# Patient Record
Sex: FEMALE | Race: WHITE | NOT HISPANIC OR LATINO | URBAN - METROPOLITAN AREA
[De-identification: names, ages, dates, MRNs, and addresses within clinical notes are randomized per-mention and may not be internally consistent; named-entity substitution may affect disease eponyms.]

---

## 2014-10-30 RX ORDER — ATORVASTATIN CALCIUM 80 MG/1
1 TABLET, FILM COATED ORAL
Qty: 0 | Refills: 0 | DISCHARGE
Start: 2014-10-30

## 2020-01-01 ENCOUNTER — OUTPATIENT (OUTPATIENT)
Dept: OUTPATIENT SERVICES | Facility: HOSPITAL | Age: 80
LOS: 1 days | End: 2020-01-01
Payer: MEDICAID

## 2020-01-01 PROCEDURE — G9001: CPT

## 2020-01-18 ENCOUNTER — INPATIENT (INPATIENT)
Facility: HOSPITAL | Age: 80
LOS: 5 days | Discharge: HOME | DRG: 871 | End: 2020-01-24
Attending: INTERNAL MEDICINE | Admitting: INTERNAL MEDICINE
Payer: MEDICARE

## 2020-01-18 VITALS
WEIGHT: 125 LBS | DIASTOLIC BLOOD PRESSURE: 72 MMHG | HEIGHT: 65 IN | RESPIRATION RATE: 18 BRPM | SYSTOLIC BLOOD PRESSURE: 167 MMHG | TEMPERATURE: 100 F | OXYGEN SATURATION: 92 % | HEART RATE: 93 BPM

## 2020-01-18 DIAGNOSIS — I48.91 UNSPECIFIED ATRIAL FIBRILLATION: ICD-10-CM

## 2020-01-18 DIAGNOSIS — J18.9 PNEUMONIA, UNSPECIFIED ORGANISM: ICD-10-CM

## 2020-01-18 DIAGNOSIS — G93.40 ENCEPHALOPATHY, UNSPECIFIED: ICD-10-CM

## 2020-01-18 DIAGNOSIS — I10 ESSENTIAL (PRIMARY) HYPERTENSION: ICD-10-CM

## 2020-01-18 DIAGNOSIS — I65.29 OCCLUSION AND STENOSIS OF UNSPECIFIED CAROTID ARTERY: ICD-10-CM

## 2020-01-18 DIAGNOSIS — E11.9 TYPE 2 DIABETES MELLITUS WITHOUT COMPLICATIONS: ICD-10-CM

## 2020-01-18 DIAGNOSIS — R82.81 PYURIA: ICD-10-CM

## 2020-01-18 DIAGNOSIS — R74.0 NONSPECIFIC ELEVATION OF LEVELS OF TRANSAMINASE AND LACTIC ACID DEHYDROGENASE [LDH]: ICD-10-CM

## 2020-01-18 LAB
ALBUMIN SERPL ELPH-MCNC: 3.5 G/DL — SIGNIFICANT CHANGE UP (ref 3.3–5)
ALP SERPL-CCNC: 99 U/L — SIGNIFICANT CHANGE UP (ref 40–120)
ALT FLD-CCNC: 53 U/L — HIGH (ref 10–45)
ANION GAP SERPL CALC-SCNC: 15 MMOL/L — SIGNIFICANT CHANGE UP (ref 5–17)
APPEARANCE UR: CLEAR — SIGNIFICANT CHANGE UP
AST SERPL-CCNC: 45 U/L — HIGH (ref 10–40)
BACTERIA # UR AUTO: NEGATIVE — SIGNIFICANT CHANGE UP
BASOPHILS # BLD AUTO: 0 K/UL — SIGNIFICANT CHANGE UP (ref 0–0.2)
BASOPHILS NFR BLD AUTO: 0 % — SIGNIFICANT CHANGE UP (ref 0–2)
BILIRUB SERPL-MCNC: 0.5 MG/DL — SIGNIFICANT CHANGE UP (ref 0.2–1.2)
BILIRUB UR-MCNC: NEGATIVE — SIGNIFICANT CHANGE UP
BUN SERPL-MCNC: 32 MG/DL — HIGH (ref 7–23)
CALCIUM SERPL-MCNC: 9.3 MG/DL — SIGNIFICANT CHANGE UP (ref 8.4–10.5)
CHLORIDE SERPL-SCNC: 97 MMOL/L — SIGNIFICANT CHANGE UP (ref 96–108)
CO2 SERPL-SCNC: 24 MMOL/L — SIGNIFICANT CHANGE UP (ref 22–31)
COLOR SPEC: SIGNIFICANT CHANGE UP
CREAT SERPL-MCNC: 1.07 MG/DL — SIGNIFICANT CHANGE UP (ref 0.5–1.3)
DIFF PNL FLD: ABNORMAL
EOSINOPHIL # BLD AUTO: 0 K/UL — SIGNIFICANT CHANGE UP (ref 0–0.5)
EOSINOPHIL NFR BLD AUTO: 0 % — SIGNIFICANT CHANGE UP (ref 0–6)
EPI CELLS # UR: 1 /HPF — SIGNIFICANT CHANGE UP
GAS PNL BLDV: SIGNIFICANT CHANGE UP
GLUCOSE BLDC GLUCOMTR-MCNC: 209 MG/DL — HIGH (ref 70–99)
GLUCOSE SERPL-MCNC: 309 MG/DL — HIGH (ref 70–99)
GLUCOSE UR QL: ABNORMAL
GRAN CASTS # UR COMP ASSIST: 1 /LPF — SIGNIFICANT CHANGE UP
HCT VFR BLD CALC: 39.5 % — SIGNIFICANT CHANGE UP (ref 34.5–45)
HGB BLD-MCNC: 12.6 G/DL — SIGNIFICANT CHANGE UP (ref 11.5–15.5)
HYALINE CASTS # UR AUTO: 3 /LPF — HIGH (ref 0–2)
KETONES UR-MCNC: SIGNIFICANT CHANGE UP
LEUKOCYTE ESTERASE UR-ACNC: NEGATIVE — SIGNIFICANT CHANGE UP
LYMPHOCYTES # BLD AUTO: 0.24 K/UL — LOW (ref 1–3.3)
LYMPHOCYTES # BLD AUTO: 1.7 % — LOW (ref 13–44)
MCHC RBC-ENTMCNC: 29.6 PG — SIGNIFICANT CHANGE UP (ref 27–34)
MCHC RBC-ENTMCNC: 31.9 GM/DL — LOW (ref 32–36)
MCV RBC AUTO: 92.7 FL — SIGNIFICANT CHANGE UP (ref 80–100)
MONOCYTES # BLD AUTO: 0.74 K/UL — SIGNIFICANT CHANGE UP (ref 0–0.9)
MONOCYTES NFR BLD AUTO: 5.3 % — SIGNIFICANT CHANGE UP (ref 2–14)
NEUTROPHILS # BLD AUTO: 12.94 K/UL — HIGH (ref 1.8–7.4)
NEUTROPHILS NFR BLD AUTO: 92.1 % — HIGH (ref 43–77)
NITRITE UR-MCNC: NEGATIVE — SIGNIFICANT CHANGE UP
PH UR: 5.5 — SIGNIFICANT CHANGE UP (ref 5–8)
PLATELET # BLD AUTO: 259 K/UL — SIGNIFICANT CHANGE UP (ref 150–400)
POTASSIUM SERPL-MCNC: 3.6 MMOL/L — SIGNIFICANT CHANGE UP (ref 3.5–5.3)
POTASSIUM SERPL-SCNC: 3.6 MMOL/L — SIGNIFICANT CHANGE UP (ref 3.5–5.3)
PROT SERPL-MCNC: 7.6 G/DL — SIGNIFICANT CHANGE UP (ref 6–8.3)
PROT UR-MCNC: ABNORMAL
RBC # BLD: 4.26 M/UL — SIGNIFICANT CHANGE UP (ref 3.8–5.2)
RBC # FLD: 12.4 % — SIGNIFICANT CHANGE UP (ref 10.3–14.5)
RBC CASTS # UR COMP ASSIST: 3 /HPF — SIGNIFICANT CHANGE UP (ref 0–4)
SODIUM SERPL-SCNC: 136 MMOL/L — SIGNIFICANT CHANGE UP (ref 135–145)
SP GR SPEC: 1.02 — SIGNIFICANT CHANGE UP (ref 1.01–1.02)
UROBILINOGEN FLD QL: NEGATIVE — SIGNIFICANT CHANGE UP
WBC # BLD: 13.91 K/UL — HIGH (ref 3.8–10.5)
WBC # FLD AUTO: 13.91 K/UL — HIGH (ref 3.8–10.5)
WBC UR QL: 6 /HPF — HIGH (ref 0–5)

## 2020-01-18 PROCEDURE — 99285 EMERGENCY DEPT VISIT HI MDM: CPT | Mod: GC

## 2020-01-18 PROCEDURE — 99223 1ST HOSP IP/OBS HIGH 75: CPT

## 2020-01-18 PROCEDURE — 93010 ELECTROCARDIOGRAM REPORT: CPT

## 2020-01-18 PROCEDURE — 71045 X-RAY EXAM CHEST 1 VIEW: CPT | Mod: 26

## 2020-01-18 PROCEDURE — 71250 CT THORAX DX C-: CPT | Mod: 26

## 2020-01-18 RX ORDER — SOTALOL HCL 120 MG
80 TABLET ORAL
Refills: 0 | Status: DISCONTINUED | OUTPATIENT
Start: 2020-01-18 | End: 2020-01-24

## 2020-01-18 RX ORDER — DEXTROSE 50 % IN WATER 50 %
25 SYRINGE (ML) INTRAVENOUS ONCE
Refills: 0 | Status: DISCONTINUED | OUTPATIENT
Start: 2020-01-18 | End: 2020-01-24

## 2020-01-18 RX ORDER — INSULIN LISPRO 100/ML
VIAL (ML) SUBCUTANEOUS AT BEDTIME
Refills: 0 | Status: DISCONTINUED | OUTPATIENT
Start: 2020-01-18 | End: 2020-01-24

## 2020-01-18 RX ORDER — ENOXAPARIN SODIUM 100 MG/ML
40 INJECTION SUBCUTANEOUS DAILY
Refills: 0 | Status: DISCONTINUED | OUTPATIENT
Start: 2020-01-18 | End: 2020-01-24

## 2020-01-18 RX ORDER — ACETAMINOPHEN 500 MG
650 TABLET ORAL EVERY 6 HOURS
Refills: 0 | Status: DISCONTINUED | OUTPATIENT
Start: 2020-01-18 | End: 2020-01-24

## 2020-01-18 RX ORDER — ACETAMINOPHEN 500 MG
975 TABLET ORAL ONCE
Refills: 0 | Status: COMPLETED | OUTPATIENT
Start: 2020-01-18 | End: 2020-01-18

## 2020-01-18 RX ORDER — INSULIN LISPRO 100/ML
VIAL (ML) SUBCUTANEOUS
Refills: 0 | Status: DISCONTINUED | OUTPATIENT
Start: 2020-01-18 | End: 2020-01-24

## 2020-01-18 RX ORDER — SOTALOL HCL 120 MG
1 TABLET ORAL
Qty: 0 | Refills: 0 | DISCHARGE

## 2020-01-18 RX ORDER — CEFTRIAXONE 500 MG/1
1000 INJECTION, POWDER, FOR SOLUTION INTRAMUSCULAR; INTRAVENOUS EVERY 24 HOURS
Refills: 0 | Status: DISCONTINUED | OUTPATIENT
Start: 2020-01-19 | End: 2020-01-23

## 2020-01-18 RX ORDER — ATORVASTATIN CALCIUM 80 MG/1
40 TABLET, FILM COATED ORAL AT BEDTIME
Refills: 0 | Status: DISCONTINUED | OUTPATIENT
Start: 2020-01-18 | End: 2020-01-24

## 2020-01-18 RX ORDER — SODIUM CHLORIDE 9 MG/ML
1000 INJECTION INTRAMUSCULAR; INTRAVENOUS; SUBCUTANEOUS ONCE
Refills: 0 | Status: COMPLETED | OUTPATIENT
Start: 2020-01-18 | End: 2020-01-18

## 2020-01-18 RX ORDER — CEFTRIAXONE 500 MG/1
1000 INJECTION, POWDER, FOR SOLUTION INTRAMUSCULAR; INTRAVENOUS ONCE
Refills: 0 | Status: COMPLETED | OUTPATIENT
Start: 2020-01-18 | End: 2020-01-18

## 2020-01-18 RX ORDER — AZITHROMYCIN 500 MG/1
500 TABLET, FILM COATED ORAL ONCE
Refills: 0 | Status: COMPLETED | OUTPATIENT
Start: 2020-01-18 | End: 2020-01-18

## 2020-01-18 RX ORDER — EZETIMIBE 10 MG/1
1 TABLET ORAL
Qty: 0 | Refills: 0 | DISCHARGE

## 2020-01-18 RX ORDER — DEXTROSE 50 % IN WATER 50 %
12.5 SYRINGE (ML) INTRAVENOUS ONCE
Refills: 0 | Status: DISCONTINUED | OUTPATIENT
Start: 2020-01-18 | End: 2020-01-24

## 2020-01-18 RX ORDER — SODIUM CHLORIDE 9 MG/ML
1000 INJECTION, SOLUTION INTRAVENOUS
Refills: 0 | Status: DISCONTINUED | OUTPATIENT
Start: 2020-01-18 | End: 2020-01-24

## 2020-01-18 RX ORDER — GLUCAGON INJECTION, SOLUTION 0.5 MG/.1ML
1 INJECTION, SOLUTION SUBCUTANEOUS ONCE
Refills: 0 | Status: DISCONTINUED | OUTPATIENT
Start: 2020-01-18 | End: 2020-01-24

## 2020-01-18 RX ORDER — DEXTROSE 50 % IN WATER 50 %
15 SYRINGE (ML) INTRAVENOUS ONCE
Refills: 0 | Status: DISCONTINUED | OUTPATIENT
Start: 2020-01-18 | End: 2020-01-24

## 2020-01-18 RX ADMIN — SODIUM CHLORIDE 1000 MILLILITER(S): 9 INJECTION INTRAMUSCULAR; INTRAVENOUS; SUBCUTANEOUS at 19:40

## 2020-01-18 RX ADMIN — SODIUM CHLORIDE 1000 MILLILITER(S): 9 INJECTION INTRAMUSCULAR; INTRAVENOUS; SUBCUTANEOUS at 17:44

## 2020-01-18 RX ADMIN — ATORVASTATIN CALCIUM 40 MILLIGRAM(S): 80 TABLET, FILM COATED ORAL at 22:45

## 2020-01-18 RX ADMIN — AZITHROMYCIN 250 MILLIGRAM(S): 500 TABLET, FILM COATED ORAL at 20:17

## 2020-01-18 RX ADMIN — Medication 975 MILLIGRAM(S): at 17:44

## 2020-01-18 RX ADMIN — CEFTRIAXONE 100 MILLIGRAM(S): 500 INJECTION, POWDER, FOR SOLUTION INTRAMUSCULAR; INTRAVENOUS at 19:39

## 2020-01-18 NOTE — ED ADULT NURSE NOTE - PMH
Diabetes mellitus, type II    HTN (hypertension)    Hypercholesteremia    Stenosis of carotid artery, bilateral

## 2020-01-18 NOTE — H&P ADULT - NSICDXPASTMEDICALHX_GEN_ALL_CORE_FT
PAST MEDICAL HISTORY:  Atrial fibrillation     Diabetes mellitus, type II     HTN (hypertension)     Hypercholesteremia     Stenosis of carotid artery, bilateral

## 2020-01-18 NOTE — H&P ADULT - PROBLEM SELECTOR PLAN 1
-Treat as possible infectious etiology with antibiotics  -If does not improve would consider CT head given risk factors (carotid stenosis, diabetes, atrial fibrillation not on anticoagulation)  -Send thyroid studies (prior history of subclinical hyperthyroidism)

## 2020-01-18 NOTE — H&P ADULT - NSHPREVIEWOFSYSTEMS_GEN_ALL_CORE
Gen: no changes in weight, fatigue, night sweats, appetite, or fever  Eyes: no changes in vision, diplopia, or floaters  ENT: no epistaxis, sinus pain, gingival bleeding, odynophagia or dysphagia  CV: no CP, SOB, PND, orthopnea, LOC, or palpitations  Resp: no cough, wheezing, or hemoptysis  GI: no abdominal pain, dyspepsia, nausea, vomiting, diarrhea, constipation, hematemesis, hematochezia, or melena  : no dysuria, polyuria, or hematuria  MSK: no arthralgias or joint swelling   Neuro: no gross sensory changes, numbness, focal deficits  Psych: no depression, changes in sleep, changes in concentration, or lack of energy  Heme/Onc: no purpura, petechiae or night sweats  Skin: no pruritus, hair loss or skin lesions  All: no photosensitivity, no complaints of anaphylaxis (SOB, throat swelling) Gen: no changes in weight, fatigue  Eyes: no changes in vision, diplopia, or floaters  ENT: no epistaxis, sinus pain, gingival bleeding, odynophagia or dysphagia  CV: no CP, SOB, PND, orthopnea, LOC, or palpitations  Resp: +cough, no wheezing  GI: no abdominal pain, dyspepsia, nausea, vomiting, diarrhea  : no dysuria, polyuria, or hematuria  MSK: no arthralgias or joint swelling   Neuro: no gross sensory changes, numbness, focal deficits  Psych: no depression, changes in sleep, changes in concentration, or lack of energy  Heme/Onc: no purpura, petechiae or night sweats  Skin: no pruritus, hair loss or skin lesions  All: no photosensitivity, no complaints of anaphylaxis (SOB, throat swelling)

## 2020-01-18 NOTE — H&P ADULT - HISTORY OF PRESENT ILLNESS
79F (German speaking) with PMHx of HTN, T2DM, significant carotid stenosis, and atrial fibrillation referred by PMD Dr. Tatum for confusion. The patient's daughter and son are at bedside providing me with the history. They state that approximately 10 days prior her household contacts (grandchildren) were sick with influenza. Afterwards patient developed non-productive cough. Approximately two to three days prior to admission patient appeared confused mixing up her daughter and granddaughter and forgetting how to use a remote. The children found this unusual, but thought it might be due to her age. They state that this confusion began abruptly two to three days prior. Since then she has not really endorsed any complaints such as dysuria, diarrhea, or abdominal pain. Unsure if there is a history of rhinorrhea, nasal congestion, sore throat, or other URI like symptoms. Patient was brought to her PMD on 1/18/20 who did a rapid influenza test which was negative. She was found to be febrile there and told to report to an emergency room. Patient with no acute complaints while in the ED. She was given ceftriaxone, azithromycin, 2 L NS, with blood and urine cultures sent as well as urine legionella. Patient with noted mild desaturations to the low 90s which corrected with nasal cannula. Patient with no visible neurological deficits. The ED is admitting due to suspicion for pneumonia.

## 2020-01-18 NOTE — H&P ADULT - ASSESSMENT
79F (Slovak speaking) with PMHx of HTN, T2DM, significant carotid stenosis, and atrial fibrillation referred by PMD Dr. Tatum for confusion and noted fever for three days. Patient's family endorsing sick contacts with family suffering from viral URI. Patient slightly febrile here with minor hypoxic respiratory failure which corrects with 2 L NC. Labs are significant for neutrophilic pre-dominant leukocytosis, slight transaminitis and slight pyuria. CXR showing possible left lower lobe opacity with CT chest being read as bibasilar atelectasis.     There is suspicion that patient has encephalopathy secondary to infectious etiology, possibly the lung. Given her slight acute hypoxic respiratory failure will treat empirically for pneumonia. Patient with no dysuria and only mild pyuria. She has a history of atrial fibrillation and bilateral carotid stenosis, would need to consider CT head and primary neurological issue if she does not improve on antibiotics.

## 2020-01-18 NOTE — H&P ADULT - NSHPLABSRESULTS_GEN_ALL_CORE
EKG personally reviewed by me: [id number], QTc [id number]    CXR personally reviewed by me: possible opacity in left lung base    CT Chest personally reviewed by me: bibasilar atelectasis

## 2020-01-18 NOTE — ED PROVIDER NOTE - OBJECTIVE STATEMENT
79 year old female patient PMH HTN and DM complaining of cough for approximately 2 weeks and AMS x 2 days. Patient admits to dry cough, fever, and fatigue. Patient was able to dictate her name and day of the week, but unable to dictate her date of birth or the month and year. Patient was seen by PMD today (01/18/2020) where ECG was normal and flu swab negative. Patient denies chills, frequency, dysuria, and urgency. Patient denies alcohol use, numbness, tingling, weakness in upper and lower extremities, or history of seizures. Patient denies taking blood sugar this morning and any treatments for symptoms.

## 2020-01-18 NOTE — ED ADULT NURSE NOTE - OBJECTIVE STATEMENT
Pt is a 80 yo F who was brought ot the ED by her family c/o fever/ams for a few days. Family states pt has been saying nonsensical things since Thursday, has been mixing up family member's names and last night she slept on the couch because she thought she had guests in the house and sleeping in her room. Also c/o productive cough with green phlegm x10 days with migdalia rib pain. Also c/o back pain "for months". Family states pt had slurred speech and they thought it was related to her dentures, maybe she didn't put them in properly. Pt speaks Beninese, family interprets, is A/O x3, denies pain, no ha/dizziness/lightheadedness, no CP/sob/palpitations, no abd pain/n/v/d. no change in urinary/bowel habits. Pt is a 78 yo F who was brought ot the ED by her family c/o fever/ams for a few days. Family states pt has been saying nonsensical things since Thursday, has been mixing up family member's names and last night she slept on the couch because she thought she had guests in the house and sleeping in her room. Also c/o productive cough with green phlegm x10 days with migdalia rib pain whencoughing. Also c/o back pain "for months". Family states pt had slurred speech and they thought it was related to her dentures, maybe she didn't put them in properly. Pt speaks French, family interprets, is A/O to person, date, thinks she is at her old job, denies pain, no ha/dizziness/lightheadedness, no CP/sob/palpitations, no abd pain/n/v/d. no change in urinary/bowel habits.

## 2020-01-18 NOTE — H&P ADULT - PROBLEM SELECTOR PLAN 8
-Continue to trend and avoid hepatotoxic medications  -US gallbladder ordered by ED, will follow it up, but don't suspect abdominal pathology given lack of symptoms and minor LFT elevation in a non-cholestatic manner

## 2020-01-18 NOTE — PATIENT PROFILE ADULT - HAS THE PATIENT BEEN ADMITTED FROM SHORT TERM REHAB?
no
I, Gladys Foley, performed the initial face to face bedside interview with this patient regarding history of present illness, review of symptoms and relevant past medical, social and family history.  I completed an independent physical examination.  I was the initial provider who evaluated this patient. I have signed out the follow up of any pending tests (i.e. labs, radiological studies) to the ACP with instructions to review any pertinent findings to me prior to determining a final disposition.  I have communicated the patient’s plan of care and disposition with the ACP.  The history, relevant review of systems, past medical and surgical history, medical decision making, and physical examination was documented by the scribe in my presence and I attest to the accuracy of the documentation.

## 2020-01-18 NOTE — H&P ADULT - PROBLEM SELECTOR PLAN 2
-Empiric treatment with Ceftriaxone  -Follow up urine legionella, start if positive  -Follow up blood cultures  -Patient with acute hypoxic respiratory failure, provide oxygen PRN

## 2020-01-18 NOTE — ED PROVIDER NOTE - CLINICAL SUMMARY MEDICAL DECISION MAKING FREE TEXT BOX
80 y/o [place] ZR speaking female, history taken from son and daughter, with history of htn, dm type 2, hld, came in with cough x 2 weeks, fever and today family noticed she was confused. concern for pneumonia, possible legionella, UTI, will obtain bloodwork, culture, CXR, IV hydration, and admission to hospital.DESI

## 2020-01-19 LAB
ALBUMIN SERPL ELPH-MCNC: 3.1 G/DL — LOW (ref 3.3–5)
ALP SERPL-CCNC: 90 U/L — SIGNIFICANT CHANGE UP (ref 40–120)
ALT FLD-CCNC: 45 U/L — SIGNIFICANT CHANGE UP (ref 10–45)
ANION GAP SERPL CALC-SCNC: 16 MMOL/L — SIGNIFICANT CHANGE UP (ref 5–17)
AST SERPL-CCNC: 40 U/L — SIGNIFICANT CHANGE UP (ref 10–40)
BILIRUB SERPL-MCNC: 0.6 MG/DL — SIGNIFICANT CHANGE UP (ref 0.2–1.2)
BUN SERPL-MCNC: 16 MG/DL — SIGNIFICANT CHANGE UP (ref 7–23)
CALCIUM SERPL-MCNC: 8.9 MG/DL — SIGNIFICANT CHANGE UP (ref 8.4–10.5)
CHLORIDE SERPL-SCNC: 103 MMOL/L — SIGNIFICANT CHANGE UP (ref 96–108)
CO2 SERPL-SCNC: 23 MMOL/L — SIGNIFICANT CHANGE UP (ref 22–31)
CREAT SERPL-MCNC: 0.79 MG/DL — SIGNIFICANT CHANGE UP (ref 0.5–1.3)
CULTURE RESULTS: NO GROWTH — SIGNIFICANT CHANGE UP
GLUCOSE BLDC GLUCOMTR-MCNC: 151 MG/DL — HIGH (ref 70–99)
GLUCOSE BLDC GLUCOMTR-MCNC: 154 MG/DL — HIGH (ref 70–99)
GLUCOSE BLDC GLUCOMTR-MCNC: 155 MG/DL — HIGH (ref 70–99)
GLUCOSE BLDC GLUCOMTR-MCNC: 176 MG/DL — HIGH (ref 70–99)
GLUCOSE SERPL-MCNC: 189 MG/DL — HIGH (ref 70–99)
HCT VFR BLD CALC: 35.6 % — SIGNIFICANT CHANGE UP (ref 34.5–45)
HGB BLD-MCNC: 11.7 G/DL — SIGNIFICANT CHANGE UP (ref 11.5–15.5)
LEGIONELLA AG UR QL: NEGATIVE — SIGNIFICANT CHANGE UP
MCHC RBC-ENTMCNC: 30.2 PG — SIGNIFICANT CHANGE UP (ref 27–34)
MCHC RBC-ENTMCNC: 32.9 GM/DL — SIGNIFICANT CHANGE UP (ref 32–36)
MCV RBC AUTO: 91.8 FL — SIGNIFICANT CHANGE UP (ref 80–100)
PLATELET # BLD AUTO: 239 K/UL — SIGNIFICANT CHANGE UP (ref 150–400)
POTASSIUM SERPL-MCNC: 3.6 MMOL/L — SIGNIFICANT CHANGE UP (ref 3.5–5.3)
POTASSIUM SERPL-SCNC: 3.6 MMOL/L — SIGNIFICANT CHANGE UP (ref 3.5–5.3)
PROCALCITONIN SERPL-MCNC: 0.42 NG/ML — HIGH (ref 0.02–0.1)
PROT SERPL-MCNC: 7.1 G/DL — SIGNIFICANT CHANGE UP (ref 6–8.3)
RAPID RVP RESULT: SIGNIFICANT CHANGE UP
RBC # BLD: 3.88 M/UL — SIGNIFICANT CHANGE UP (ref 3.8–5.2)
RBC # FLD: 12.4 % — SIGNIFICANT CHANGE UP (ref 10.3–14.5)
SODIUM SERPL-SCNC: 142 MMOL/L — SIGNIFICANT CHANGE UP (ref 135–145)
SPECIMEN SOURCE: SIGNIFICANT CHANGE UP
T4 AB SER-ACNC: 7.9 UG/DL — SIGNIFICANT CHANGE UP (ref 4.6–12)
TSH SERPL-MCNC: 0.31 UIU/ML — SIGNIFICANT CHANGE UP (ref 0.27–4.2)
WBC # BLD: 13.74 K/UL — HIGH (ref 3.8–10.5)
WBC # FLD AUTO: 13.74 K/UL — HIGH (ref 3.8–10.5)

## 2020-01-19 PROCEDURE — 70498 CT ANGIOGRAPHY NECK: CPT | Mod: 26

## 2020-01-19 PROCEDURE — 70496 CT ANGIOGRAPHY HEAD: CPT | Mod: 26

## 2020-01-19 RX ADMIN — Medication 650 MILLIGRAM(S): at 10:20

## 2020-01-19 RX ADMIN — CEFTRIAXONE 100 MILLIGRAM(S): 500 INJECTION, POWDER, FOR SOLUTION INTRAMUSCULAR; INTRAVENOUS at 17:46

## 2020-01-19 RX ADMIN — Medication 100 MILLIGRAM(S): at 17:46

## 2020-01-19 RX ADMIN — ENOXAPARIN SODIUM 40 MILLIGRAM(S): 100 INJECTION SUBCUTANEOUS at 11:54

## 2020-01-19 RX ADMIN — Medication 10 MILLIGRAM(S): at 17:14

## 2020-01-19 RX ADMIN — ATORVASTATIN CALCIUM 40 MILLIGRAM(S): 80 TABLET, FILM COATED ORAL at 21:14

## 2020-01-19 RX ADMIN — Medication 80 MILLIGRAM(S): at 17:14

## 2020-01-19 RX ADMIN — Medication 1: at 17:57

## 2020-01-19 RX ADMIN — Medication 10 MILLIGRAM(S): at 06:06

## 2020-01-19 RX ADMIN — Medication 1: at 11:53

## 2020-01-19 RX ADMIN — Medication 1: at 08:59

## 2020-01-19 RX ADMIN — Medication 80 MILLIGRAM(S): at 06:06

## 2020-01-19 NOTE — PROGRESS NOTE ADULT - SUBJECTIVE AND OBJECTIVE BOX
Patient is a 79y old  Female who presents with a chief complaint of Confusion (2020 19:50)      SUBJECTIVE / OVERNIGHT EVENTS: Comfortable without new complaints. Family at bedside.   Review of Systems  chest pain no  palpitations no  sob no  nausea no  headache no    MEDICATIONS  (STANDING):  atorvastatin 40 milliGRAM(s) Oral at bedtime  cefTRIAXone   IVPB 1000 milliGRAM(s) IV Intermittent every 24 hours  dextrose 5%. 1000 milliLiter(s) (50 mL/Hr) IV Continuous <Continuous>  dextrose 50% Injectable 12.5 Gram(s) IV Push once  dextrose 50% Injectable 25 Gram(s) IV Push once  dextrose 50% Injectable 25 Gram(s) IV Push once  enalapril 10 milliGRAM(s) Oral two times a day  enoxaparin Injectable 40 milliGRAM(s) SubCutaneous daily  insulin lispro (HumaLOG) corrective regimen sliding scale   SubCutaneous three times a day before meals  insulin lispro (HumaLOG) corrective regimen sliding scale   SubCutaneous at bedtime  sotalol 80 milliGRAM(s) Oral two times a day    MEDICATIONS  (PRN):  acetaminophen   Tablet .. 650 milliGRAM(s) Oral every 6 hours PRN Mild Pain (1 - 3), Moderate Pain (4 - 6)  dextrose 40% Gel 15 Gram(s) Oral once PRN Blood Glucose LESS THAN 70 milliGRAM(s)/deciliter  glucagon  Injectable 1 milliGRAM(s) IntraMuscular once PRN Glucose LESS THAN 70 milligrams/deciliter      Vital Signs Last 24 Hrs  T(C): 36.8 (2020 14:44), Max: 39 (2020 10:11)  T(F): 98.2 (2020 14:44), Max: 102.2 (2020 10:11)  HR: 73 (2020 14:44) (73 - 94)  BP: 130/72 (2020 14:44) (125/62 - 167/72)  BP(mean): --  RR: 18 (2020 14:44) (18 - 20)  SpO2: 95% (2020 14:44) (89% - 96%)    PHYSICAL EXAM:  GENERAL: NAD  HEAD:  Atraumatic, Normocephalic  EYES: EOMI, PERRLA, conjunctiva and sclera clear  NECK: Supple, No JVD  CHEST/LUNG: Clear to auscultation bilaterally; No wheeze  HEART: Regular rate and rhythm; No murmurs, rubs, or gallops  ABDOMEN: Soft, Nontender, Nondistended; Bowel sounds present  EXTREMITIES:  2+ Peripheral Pulses, No clubbing, cyanosis, or edema  PSYCH: AAOx3  NEUROLOGY: non-focal  SKIN: No rashes or lesions    LABS:                        11.7   13.74 )-----------( 239      ( 2020 09:30 )             35.6         142  |  103  |  16  ----------------------------<  189<H>  3.6   |  23  |  0.79    Ca    8.9      2020 07:14    TPro  7.1  /  Alb  3.1<L>  /  TBili  0.6  /  DBili  x   /  AST  40  /  ALT  45  /  AlkPhos  90            Urinalysis Basic - ( 2020 17:57 )    Color: Light Yellow / Appearance: Clear / S.023 / pH: x  Gluc: x / Ketone: Trace  / Bili: Negative / Urobili: Negative   Blood: x / Protein: 30 mg/dL / Nitrite: Negative   Leuk Esterase: Negative / RBC: 3 /hpf / WBC 6 /HPF   Sq Epi: x / Non Sq Epi: 1 /hpf / Bacteria: Negative        Culture - Urine (collected 2020 20:00)  Source: .Urine Clean Catch (Midstream)  Final Report (2020 14:53):    No growth        RADIOLOGY & ADDITIONAL TESTS:    Imaging Personally Reviewed:    Consultant(s) Notes Reviewed:      Care Discussed with Consultants/Other Providers:

## 2020-01-19 NOTE — CONSULT NOTE ADULT - SUBJECTIVE AND OBJECTIVE BOX
HPI:   Patient is a 79y female who developed cough about 10 days ago and starting 4 days ago she became confused, went to see her pcp yesterday, swab negative for flu in office and sent to hospital. She has ongoing fever and confusion and wet cough. No recent travel, her family member  had the flu recently, no vomiting or dental work.   Patient cannot give a coherent history  REVIEW OF SYSTEMS:  All other review of systems negative (Comprehensive ROS)    PAST MEDICAL & SURGICAL HISTORY:  Atrial fibrillation  Diabetes mellitus, type II  Hypercholesteremia  HTN (hypertension)  Stenosis of carotid artery, bilateral  S/P cataract extraction, right  S/P appendectomy      Allergies    No Known Allergies    Intolerances        Antimicrobials Day #  :1  cefTRIAXone   IVPB 1000 milliGRAM(s) IV Intermittent every 24 hours  doxycycline hyclate Capsule 100 milliGRAM(s) Oral every 12 hours    Other Medications:  acetaminophen   Tablet .. 650 milliGRAM(s) Oral every 6 hours PRN  atorvastatin 40 milliGRAM(s) Oral at bedtime  dextrose 40% Gel 15 Gram(s) Oral once PRN  dextrose 5%. 1000 milliLiter(s) IV Continuous <Continuous>  dextrose 50% Injectable 12.5 Gram(s) IV Push once  dextrose 50% Injectable 25 Gram(s) IV Push once  dextrose 50% Injectable 25 Gram(s) IV Push once  enalapril 10 milliGRAM(s) Oral two times a day  enoxaparin Injectable 40 milliGRAM(s) SubCutaneous daily  glucagon  Injectable 1 milliGRAM(s) IntraMuscular once PRN  insulin lispro (HumaLOG) corrective regimen sliding scale   SubCutaneous three times a day before meals  insulin lispro (HumaLOG) corrective regimen sliding scale   SubCutaneous at bedtime  sotalol 80 milliGRAM(s) Oral two times a day      FAMILY HISTORY:  Family history of hypercholesterolemia  Family history of hypertension      SOCIAL HISTORY:  Smoking: [ ]Yes [x ]No  ETOH: [ ]Yes x[ ]No  Drug Use: [ ]Yes [x ]No   [x ] Single[ ]    T(F): 98.2 (20 @ 14:44), Max: 102.2 (20 @ 10:11)  HR: 73 (20 @ 14:44)  BP: 130/72 (20 @ 14:44)  RR: 18 (20 @ 14:44)  SpO2: 95% (20 @ 14:44)  Wt(kg): --    PHYSICAL EXAM:  General: alert, no acute distress  Eyes:  anicteric, no conjunctival injection, no discharge  Oropharynx: no lesions or injection 	  Neck: supple, without adenopathy  Lungs: clear to auscultation  Heart: regular rate and rhythm; no murmur, rubs or gallops  Abdomen: soft, nondistended, nontender, without mass or organomegaly  Skin: no lesions  Extremities: no clubbing, cyanosis, or edema  Neurologic: alert, pleasantly confused, moves all extremities    LAB RESULTS:                        11.7   13.74 )-----------( 239      ( 2020 09:30 )             35.6         142  |  103  |  16  ----------------------------<  189<H>  3.6   |  23  |  0.79    Ca    8.9      2020 07:14    TPro  7.1  /  Alb  3.1<L>  /  TBili  0.6  /  DBili  x   /  AST  40  /  ALT  45  /  AlkPhos  90      LIVER FUNCTIONS - ( 2020 07:14 )  Alb: 3.1 g/dL / Pro: 7.1 g/dL / ALK PHOS: 90 U/L / ALT: 45 U/L / AST: 40 U/L / GGT: x           Urinalysis Basic - ( 2020 17:57 )    Color: Light Yellow / Appearance: Clear / S.023 / pH: x  Gluc: x / Ketone: Trace  / Bili: Negative / Urobili: Negative   Blood: x / Protein: 30 mg/dL / Nitrite: Negative   Leuk Esterase: Negative / RBC: 3 /hpf / WBC 6 /HPF   Sq Epi: x / Non Sq Epi: 1 /hpf / Bacteria: Negative        MICROBIOLOGY:  RECENT CULTURES:   @ 20:00 .Urine Clean Catch (Midstream)     No growth      RADIOLOGY REVIEWED:    < from: CT Chest No Cont (20 @ 19:53) >  EXAM:  CT CHEST                            PROCEDURE DATE:  2020            INTERPRETATION:  CLINICAL INFORMATION: Fever    COMPARISON: Chest radiograph performed earlier the same day.    PROCEDURE:   CT of the Chest was performed without intravenous contrast.  Sagittal and coronal reformats were performed.    FINDINGS:    No axillary adenopathy. Small mediastinal lymph nodes. The thyroid is enlarged.    Calcifications of the aortic root and coronary arteries. The heart is normal in size.No pericardial effusion.    No endobronchial lesion. Mosaic attenuation of the lungs, likely air trapping. Parenchymal opacities are noted in both lower lobes. Bilateral parenchymal infiltrates are suspected.  Area of tree-in-bud opacities in the right lower lobe (2:65), likely impacted distal airways. No pneumothorax.    The visualized upper abdomen is unremarkable.    Evaluation of the osseous structures demonstrates degenerative changes of the spine.    IMPRESSION:     Bibasilar parenchymal infiltrates.      < end of copied text >  Impression:  Patient with 10 days of cough, 4 days of confusion, now known to be febrile, infiltrates on ct chest. Suspect bacterial pneumonia, may even be post viral.     Recommendations:  cover with doxy and ceftriaxone  sputum culture  check full rvp  neuro w/u in progress per neurologist  f/u cultures  supportive care per Dr. Lacey  speech and swallow evaluation

## 2020-01-19 NOTE — CHART NOTE - NSCHARTNOTEFT_GEN_A_CORE
[facility] Event Note    Notified by RN patient with fever - temperature of 102.2 .  Patient seen and examined at the bedside.  Patient is alert.  Denies headache, visual changes, chest pain, palpitations, shortness of breath, cough, abdominal pain, nausea, vomiting, diarrhea and dysuria.  VITAL SIGNS:  T(C): 36.8 (01-19-20 @ 14:44), Max: 39 (01-19-20 @ 10:11)  T(F): 98.2 (01-19-20 @ 14:44), Max: 102.2 (01-19-20 @ 10:11)  HR: 73 (01-19-20 @ 14:44) (73 - 94)  BP: 130/72 (01-19-20 @ 14:44) (125/62 - 166/81)  RR: 18 (01-19-20 @ 14:44) (18 - 20)  SpO2: 95% (01-19-20 @ 14:44) (89% - 96%)    Urine Culture: UCx from 1/18 shows NGTD  Blood Culture: BCx x2 from 1/18 pending results  CXR from 1/18 shows Patchy bibasilar opacity may represent atelectasis versus pneumonia. Small left pleural effusion.      PHYSICAL EXAM:   GENERAL: Laying down, in no acute distress  PSYCH: AAOx3  HEAD:  Atraumatic, Normocephalic  EYES: EOMI, PERRLA, conjunctiva and sclera clear  NECK: Supple, No JVD  CHEST/LUNG: Breath sounds clear to auscultation bilaterally.  No wheezes, rales, rhonchi or crackles  HEART: +S1/S2.  Regular rate and rhythm.  No murmurs, rubs or gallops  ABDOMEN: Bowel sounds present in all 4 quadrants.  Soft, Nontender, Nondistended  EXTREMITIES: No edema or erythema noted in bilateral lower extremities  NEUROLOGY: Cranial nerves 2-12 grossly intact  SKIN: No rashes or lesions      1) fever  - Tylenol / Cooling measures prn for Pyrexia  -Discussed with Dr. Lacey- ID consult placed today  -Continue IV ceftriaxone  -Will continue to monitor and endorse to night team    Lis White PA-C  Dept of Medicine  #[id number]

## 2020-01-19 NOTE — CONSULT NOTE ADULT - SUBJECTIVE AND OBJECTIVE BOX
HPI:  79F (Kinyarwanda speaking) with PMHx of HTN, T2DM, significant carotid stenosis, and atrial fibrillation referred by PMD Dr. Tatum for confusion. The patient's daughter and son are at bedside providing me with the history. They state that approximately 10 days prior her household contacts (grandchildren) were sick with influenza. Afterwards patient developed non-productive cough. Approximately two to three days prior to admission patient appeared confused mixing up her daughter and granddaughter and forgetting how to use a remote. The children found this unusual, but thought it might be due to her age. They state that this confusion began abruptly two to three days prior. Since then she has not really endorsed any complaints such as dysuria, diarrhea, or abdominal pain. Unsure if there is a history of rhinorrhea, nasal congestion, sore throat, or other URI like symptoms. Patient was brought to her PMD on 20 who did a rapid influenza test which was negative. She was found to be febrile there and told to report to an emergency room. Patient with no acute complaints while in the ED. She was given ceftriaxone, azithromycin, 2 L NS, with blood and urine cultures sent as well as urine legionella. Patient with noted mild desaturations to the low 90s which corrected with nasal cannula. Patient with no visible neurological deficits. The ED is admitting due to suspicion for pneumonia. (2020 19:50)    (Stroke only)  NIHSS:   MRS:   ICH:     REVIEW OF SYSTEMS  General:	  Skin/Breast:	  Ophthalmologic:  ENMT:	  Respiratory and Thorax:	  Cardiovascular:	  Gastrointestinal:	  Genitourinary:	  Musculoskeletal:	  Neurological:	  Psychiatric:	  Hematology/Lymphatics:	  Endocrine:	  Allergic/Immunologic:	    A 10-system ROS was performed and is negative except for those items noted above and/or in the HPI.    PAST MEDICAL & SURGICAL HISTORY:  Atrial fibrillation  Diabetes mellitus, type II  Hypercholesteremia  HTN (hypertension)  Stenosis of carotid artery, bilateral  S/P cataract extraction, right  S/P appendectomy    FAMILY HISTORY:  Family history of hypercholesterolemia  Family history of hypertension    SOCIAL HISTORY:   T/E/D:   Occupation:   Lives with:     MEDICATIONS (HOME):  Home Medications:  atorvastatin 40 mg oral tablet: 1 tab(s) orally once a day (2020 20:45)  enalapril 10 mg oral tablet: 1 tab(s) orally 2 times a day (2020 20:45)  ezetimibe 10 mg oral tablet: 1 tab(s) orally once a day (2020 20:46)  glipiZIDE 10 mg oral tablet: 1 tab(s) orally once a day (2020 20:45)  sotalol 80 mg oral tablet: 1 tab(s) orally 2 times a day (2020 20:45)    MEDICATIONS  (STANDING):  atorvastatin 40 milliGRAM(s) Oral at bedtime  cefTRIAXone   IVPB 1000 milliGRAM(s) IV Intermittent every 24 hours  dextrose 5%. 1000 milliLiter(s) (50 mL/Hr) IV Continuous <Continuous>  dextrose 50% Injectable 12.5 Gram(s) IV Push once  dextrose 50% Injectable 25 Gram(s) IV Push once  dextrose 50% Injectable 25 Gram(s) IV Push once  enalapril 10 milliGRAM(s) Oral two times a day  enoxaparin Injectable 40 milliGRAM(s) SubCutaneous daily  insulin lispro (HumaLOG) corrective regimen sliding scale   SubCutaneous three times a day before meals  insulin lispro (HumaLOG) corrective regimen sliding scale   SubCutaneous at bedtime  sotalol 80 milliGRAM(s) Oral two times a day    MEDICATIONS  (PRN):  acetaminophen   Tablet .. 650 milliGRAM(s) Oral every 6 hours PRN Mild Pain (1 - 3), Moderate Pain (4 - 6)  dextrose 40% Gel 15 Gram(s) Oral once PRN Blood Glucose LESS THAN 70 milliGRAM(s)/deciliter  glucagon  Injectable 1 milliGRAM(s) IntraMuscular once PRN Glucose LESS THAN 70 milligrams/deciliter    ALLERGIES/INTOLERANCES:  Allergies  No Known Allergies    Intolerances    VITALS & EXAMINATION:  Vital Signs Last 24 Hrs  T(C): 36.8 (2020 14:44), Max: 39 (2020 10:11)  T(F): 98.2 (2020 14:44), Max: 102.2 (2020 10:11)  HR: 73 (2020 14:44) (73 - 94)  BP: 130/72 (2020 14:44) (125/62 - 167/72)  BP(mean): --  RR: 18 (2020 14:44) (18 - 20)  SpO2: 95% (2020 14:44) (89% - 96%)    General:  Constitutional: Obese Female, appears stated age, in no apparent distress including pain  Head: Normocephalic & atraumatic.  ENT: Patent ear canals, intact TM, mucus membranes moist & pink, neck supple, no lymphadenopathy.   Respiratory: Patent airway. All lung fields are clear to auscultation bilaterally.  Extremities: No cyanosis, clubbing, or edema.  Skin: No rashes, bruising, or discoloration.    Cardiovascular (>2): RRR no murmurs. Carotid pulsations symmetric, no bruits. Normal capillary beds refill, 1-2 seconds or less.     Neurological (>12):  MS: Awake, alert, oriented to person, place, situation, time. Normal affect. Follows all commands.    Language: Speech is clear, fluent with good repetition & comprehension (able to name objects___)    CNs: PERRLA (R = 3mm, L = 3mm). VFF. EOMI no nystagmus, no diplopia. V1-3 intact to LT/pinprick, well developed masseter muscles b/l. No facial asymmetry b/l, full eye closure strength b/l. Hearing grossly normal (rubbing fingers) b/l. Symmetric palate elevation in midline. Gag reflex deferred. Head turning & shoulder shrug intact b/l. Tongue midline, normal movements, no atrophy.    Fundoscopic: pale w/ sharp discs margins No vascular changes.      Motor: Normal muscle bulk & tone. No noticeable tremor or seizure. No pronator drift.              Deltoid	Biceps	Triceps	Wrist	Finger ABd	   R	5	5	5	5	5		5 	  L	5	5	5	5	5		5    	H-Flex	H-Ext	H-ABd	H-ADd	K-Flex	K-Ext	D-Flex	P-Flex  R	5	5	5	5	5	5	5	5 	   L	5	5	5	5	5	5	5	5	     Sensation: Intact to LT/PP/Temp/Vibration/Position b/l throughout.     Cortical: Extinction on DSS (neglect): none    Reflexes:              Biceps(C5)       BR(C6)     Triceps(C7)               Patellar(L4)    Achilles(S1)    Plantar Resp  R	2	          2	             2		        2		    2		Down   L	2	          2	             2		        2		    2		Down     Coordination: intact rapid-alt movements. No dysmetria to FTN/HTS    Gait: Normal Romberg. No postural instability. Normal stance and tandem gait.     LABORATORY:  CBC                       11.7   13.74 )-----------( 239      ( 2020 09:30 )             35.6     Chem     142  |  103  |  16  ----------------------------<  189<H>  3.6   |  23  |  0.79    Ca    8.9      2020 07:14    TPro  7.1  /  Alb  3.1<L>  /  TBili  0.6  /  DBili  x   /  AST  40  /  ALT  45  /  AlkPhos  90      LFTs LIVER FUNCTIONS - ( 2020 07:14 )  Alb: 3.1 g/dL / Pro: 7.1 g/dL / ALK PHOS: 90 U/L / ALT: 45 U/L / AST: 40 U/L / GGT: x           Coagulopathy   Lipid Panel   A1c   Cardiac enzymes     U/A Urinalysis Basic - ( 2020 17:57 )    Color: Light Yellow / Appearance: Clear / S.023 / pH: x  Gluc: x / Ketone: Trace  / Bili: Negative / Urobili: Negative   Blood: x / Protein: 30 mg/dL / Nitrite: Negative   Leuk Esterase: Negative / RBC: 3 /hpf / WBC 6 /HPF   Sq Epi: x / Non Sq Epi: 1 /hpf / Bacteria: Negative      CSF  Immunological  Other    STUDIES & IMAGING:  Studies (EKG, EEG, EMG, etc):     Radiology (XR, CT, MR, U/S, TTE/JODI): HPI: 80yo Armenian speaking woman with h/o HTN, DMII, b/l carotid stenosis with known L ICA occlusion (), atrial fibrillation (not on AC for unclear reason only on aspirin) admitted for pneumonia with Neurology consult for 3-4 days of confusion. Patient's daughter at bedside and she provides the history. She lives about 10 minutes from patient's home (who lives alone) and noted that for about 10 days, patient has been having lots of coughing. She has noted intermittently the past 3-4 days that patient has seemed confused on the phone. Examples of confusion are as follows: "She normally reads the Bible in the mornings, and one afternoon she was on the phone with me and told me to hang up because she was busy still reading the Bible. For 2 of the days at morning, afternoon, and night times when she was on the phone she would tell me she was taking a nap. Another one of the days the patient was on the phone with the daughter and reported that the phone wasn't working although patient was using the phone and it was clearly working and the patient kept saying "Oh you do not understand!"" from chart review patient has had some ?thyroid disease though not currently on thyroid medications. The daughter says that the patient was on coumadin years ago but it is unclear why the patient is not currently taking any AC for her afib. Aside from the cough, the daughter denies reporting the patient having head or neck trauma, loss of consciousness, urinary or fecal incontinence, dizziness or lightheadedness, sudden weakness, visual disturbance, headache. At this time, patient appears to be getting back to baseline, but is not completely back to baseline.    REVIEW OF SYSTEMS    A 10-system ROS was performed and is negative except for those items noted above and/or in the HPI.    PAST MEDICAL & SURGICAL HISTORY:  Atrial fibrillation  Diabetes mellitus, type II  Hypercholesteremia  HTN (hypertension)  Stenosis of carotid artery, bilateral  S/P cataract extraction, right  S/P appendectomy    FAMILY HISTORY:  Family history of hypercholesterolemia  Family history of hypertension    MEDICATIONS (HOME):  Home Medications:  atorvastatin 40 mg oral tablet: 1 tab(s) orally once a day (2020 20:45)  enalapril 10 mg oral tablet: 1 tab(s) orally 2 times a day (2020 20:45)  ezetimibe 10 mg oral tablet: 1 tab(s) orally once a day (2020 20:46)  glipiZIDE 10 mg oral tablet: 1 tab(s) orally once a day (2020 20:45)  sotalol 80 mg oral tablet: 1 tab(s) orally 2 times a day (2020 20:45)    MEDICATIONS  (STANDING):  atorvastatin 40 milliGRAM(s) Oral at bedtime  cefTRIAXone   IVPB 1000 milliGRAM(s) IV Intermittent every 24 hours  dextrose 5%. 1000 milliLiter(s) (50 mL/Hr) IV Continuous <Continuous>  dextrose 50% Injectable 12.5 Gram(s) IV Push once  dextrose 50% Injectable 25 Gram(s) IV Push once  dextrose 50% Injectable 25 Gram(s) IV Push once  enalapril 10 milliGRAM(s) Oral two times a day  enoxaparin Injectable 40 milliGRAM(s) SubCutaneous daily  insulin lispro (HumaLOG) corrective regimen sliding scale   SubCutaneous three times a day before meals  insulin lispro (HumaLOG) corrective regimen sliding scale   SubCutaneous at bedtime  sotalol 80 milliGRAM(s) Oral two times a day    MEDICATIONS  (PRN):  acetaminophen   Tablet .. 650 milliGRAM(s) Oral every 6 hours PRN Mild Pain (1 - 3), Moderate Pain (4 - 6)  dextrose 40% Gel 15 Gram(s) Oral once PRN Blood Glucose LESS THAN 70 milliGRAM(s)/deciliter  glucagon  Injectable 1 milliGRAM(s) IntraMuscular once PRN Glucose LESS THAN 70 milligrams/deciliter    ALLERGIES/INTOLERANCES:  Allergies  No Known Allergies    VITALS & EXAMINATION:  Vital Signs Last 24 Hrs  T(C): 36.8 (2020 14:44), Max: 39 (2020 10:11)  T(F): 98.2 (2020 14:44), Max: 102.2 (2020 10:11)  HR: 73 (2020 14:44) (73 - 94)  BP: 130/72 (2020 14:44) (125/62 - 167/72)  BP(mean): --  RR: 18 (2020 14:44) (18 - 20)  SpO2: 95% (2020 14:44) (89% - 96%)    Neurological (>12):  MS: Awake, alert, oriented to self, could say she is in [place], but could not tell me she is in the hospital. Normal affect. Follows all commands with daughter providing translation.    Language: Speech is clear, fluent with good repetition & comprehension    CNs: PERRLA (R = 4mm, L = 4mm). VFF. EOMI no nystagmus, no diplopia. V1-3 intact to LT/pinprick, well developed masseter muscles b/l. mild left nasolabial flattening though daughter reports this is her baseline (she uses dentures), full eye closure strength b/l. Hearing grossly normal (rubbing fingers) b/l. Symmetric palate elevation in midline. Gag reflex deferred. Head turning & shoulder shrug intact b/l. Tongue midline, normal movements, no atrophy.    Motor: Normal muscle bulk & tone. No noticeable tremor. No pronator nor downward drift. Patient otherwise grossly strong symmetrically	     Sensation: Intact to LT b/l throughout.     Cortical: Extinction on DSS (neglect): none    Coordination: intact rapid-alt movements. No dysmetria to FTN    Gait: patient walked to bathroom without requiring assistance and without a walker without gait instability 30 min prior to my examination, patient refused to perform gait examination for me     LABORATORY:  CBC                       11.7   13.74 )-----------( 239      ( 2020 09:30 )             35.6     Chem -    142  |  103  |  16  ----------------------------<  189<H>  3.6   |  23  |  0.79    Ca    8.9      2020 07:14    TPro  7.1  /  Alb  3.1<L>  /  TBili  0.6  /  DBili  x   /  AST  40  /  ALT  45  /  AlkPhos  90      LFTs LIVER FUNCTIONS - ( 2020 07:14 )  Alb: 3.1 g/dL / Pro: 7.1 g/dL / ALK PHOS: 90 U/L / ALT: 45 U/L / AST: 40 U/L / GGT: x           Coagulopathy   Lipid Panel   A1c   Cardiac enzymes     U/A Urinalysis Basic - ( 2020 17:57 )    Color: Light Yellow / Appearance: Clear / S.023 / pH: x  Gluc: x / Ketone: Trace  / Bili: Negative / Urobili: Negative   Blood: x / Protein: 30 mg/dL / Nitrite: Negative   Leuk Esterase: Negative / RBC: 3 /hpf / WBC 6 /HPF   Sq Epi: x / Non Sq Epi: 1 /hpf / Bacteria: Negative      CSF  Immunological  Other    STUDIES & IMAGING:  Studies (EKG, EEG, EMG, etc):     Radiology (XR, CT, MR, U/S, TTE/JODI):    no neuroimaging at this time

## 2020-01-19 NOTE — PROVIDER CONTACT NOTE (OTHER) - ACTION/TREATMENT ORDERED:
ELLEN Cote notified and made aware. No action at this time, pt is afebrile. Will continue to monitor.

## 2020-01-19 NOTE — PROGRESS NOTE ADULT - ASSESSMENT
79F (German speaking) with PMHx of HTN, T2DM, significant carotid stenosis, and atrial fibrillation referred by PMD Dr. Tatum for confusion and noted fever for three days. Patient's family endorsing sick contacts with family suffering from viral URI. Patient slightly febrile here with minor hypoxic respiratory failure which corrects with 2 L NC. Labs are significant for neutrophilic pre-dominant leukocytosis, slight transaminitis and slight pyuria. CXR showing possible left lower lobe opacity with CT chest being read as bibasilar atelectasis.     There is suspicion that patient has encephalopathy secondary to infectious etiology, possibly the lung. Given her slight acute hypoxic respiratory failure will treat empirically for pneumonia. Patient with no dysuria and only mild pyuria. She has a history of atrial fibrillation and bilateral carotid stenosis, would need to consider CT head and primary neurological issue if she does not improve on antibiotics.     Encephalopathy acute.   - Treat as possible infectious etiology with antibiotics  - CT head, CTa head and neck  - TSH, B 12  - Neurology evaluation     Pneumonia.   - Empiric treatment with Ceftriaxone  - Follow up urine legionella, start Rx if positive  - Follow up blood cultures  - ID evaluation Dr. Crabtree  - Pulmonary evaluation Dr. Cabrera     Atrial fibrillation.   -Continue with Sotalol (QTc 462)  -Patient not on anti-coagulation now. Will defer to primary care physician.     Diabetes mellitus, type II.   - hold Glipizide while inpatient  - FS TID AC and Insulin sliding scale.     HTN (hypertension).  - Continue with Enalapril 10 mg BID.     Carotid stenosis.   - Continue with Lipitor 40  - Hold Ezetimibe for now.    Pyuria.   - No dysuria, antibiotic for pneumonia should cover urine organisms  - Follow up urine culture.     Transaminitis.  - Continue to trend and avoid hepatotoxic medications  - US gallbladder     d/w daughter at bedside    Noe Lacey MD pager [phone]

## 2020-01-20 DIAGNOSIS — R09.02 HYPOXEMIA: ICD-10-CM

## 2020-01-20 DIAGNOSIS — J15.9 UNSPECIFIED BACTERIAL PNEUMONIA: ICD-10-CM

## 2020-01-20 LAB
ALBUMIN SERPL ELPH-MCNC: 2.9 G/DL — LOW (ref 3.3–5)
ALP SERPL-CCNC: 83 U/L — SIGNIFICANT CHANGE UP (ref 40–120)
ALT FLD-CCNC: 41 U/L — SIGNIFICANT CHANGE UP (ref 10–45)
ANION GAP SERPL CALC-SCNC: 15 MMOL/L — SIGNIFICANT CHANGE UP (ref 5–17)
AST SERPL-CCNC: 33 U/L — SIGNIFICANT CHANGE UP (ref 10–40)
BILIRUB SERPL-MCNC: 0.5 MG/DL — SIGNIFICANT CHANGE UP (ref 0.2–1.2)
BUN SERPL-MCNC: 11 MG/DL — SIGNIFICANT CHANGE UP (ref 7–23)
CALCIUM SERPL-MCNC: 8.9 MG/DL — SIGNIFICANT CHANGE UP (ref 8.4–10.5)
CHLORIDE SERPL-SCNC: 103 MMOL/L — SIGNIFICANT CHANGE UP (ref 96–108)
CO2 SERPL-SCNC: 24 MMOL/L — SIGNIFICANT CHANGE UP (ref 22–31)
CREAT SERPL-MCNC: 0.76 MG/DL — SIGNIFICANT CHANGE UP (ref 0.5–1.3)
GLUCOSE BLDC GLUCOMTR-MCNC: 111 MG/DL — HIGH (ref 70–99)
GLUCOSE BLDC GLUCOMTR-MCNC: 123 MG/DL — HIGH (ref 70–99)
GLUCOSE BLDC GLUCOMTR-MCNC: 225 MG/DL — HIGH (ref 70–99)
GLUCOSE BLDC GLUCOMTR-MCNC: 233 MG/DL — HIGH (ref 70–99)
GLUCOSE SERPL-MCNC: 115 MG/DL — HIGH (ref 70–99)
HCT VFR BLD CALC: 34.7 % — SIGNIFICANT CHANGE UP (ref 34.5–45)
HGB BLD-MCNC: 11.7 G/DL — SIGNIFICANT CHANGE UP (ref 11.5–15.5)
MCHC RBC-ENTMCNC: 30.5 PG — SIGNIFICANT CHANGE UP (ref 27–34)
MCHC RBC-ENTMCNC: 33.7 GM/DL — SIGNIFICANT CHANGE UP (ref 32–36)
MCV RBC AUTO: 90.4 FL — SIGNIFICANT CHANGE UP (ref 80–100)
PLATELET # BLD AUTO: 240 K/UL — SIGNIFICANT CHANGE UP (ref 150–400)
POTASSIUM SERPL-MCNC: 3.6 MMOL/L — SIGNIFICANT CHANGE UP (ref 3.5–5.3)
POTASSIUM SERPL-SCNC: 3.6 MMOL/L — SIGNIFICANT CHANGE UP (ref 3.5–5.3)
PROT SERPL-MCNC: 6.8 G/DL — SIGNIFICANT CHANGE UP (ref 6–8.3)
RBC # BLD: 3.84 M/UL — SIGNIFICANT CHANGE UP (ref 3.8–5.2)
RBC # FLD: 12.7 % — SIGNIFICANT CHANGE UP (ref 10.3–14.5)
SODIUM SERPL-SCNC: 142 MMOL/L — SIGNIFICANT CHANGE UP (ref 135–145)
TSH SERPL-MCNC: 0.43 UIU/ML — SIGNIFICANT CHANGE UP (ref 0.27–4.2)
VIT B12 SERPL-MCNC: >2000 PG/ML — HIGH (ref 232–1245)
WBC # BLD: 11.78 K/UL — HIGH (ref 3.8–10.5)
WBC # FLD AUTO: 11.78 K/UL — HIGH (ref 3.8–10.5)

## 2020-01-20 RX ORDER — IPRATROPIUM/ALBUTEROL SULFATE 18-103MCG
3 AEROSOL WITH ADAPTER (GRAM) INHALATION EVERY 6 HOURS
Refills: 0 | Status: DISCONTINUED | OUTPATIENT
Start: 2020-01-20 | End: 2020-01-24

## 2020-01-20 RX ORDER — LANOLIN ALCOHOL/MO/W.PET/CERES
3 CREAM (GRAM) TOPICAL AT BEDTIME
Refills: 0 | Status: COMPLETED | OUTPATIENT
Start: 2020-01-20 | End: 2020-01-21

## 2020-01-20 RX ADMIN — Medication 100 MILLIGRAM(S): at 17:34

## 2020-01-20 RX ADMIN — CEFTRIAXONE 100 MILLIGRAM(S): 500 INJECTION, POWDER, FOR SOLUTION INTRAMUSCULAR; INTRAVENOUS at 17:35

## 2020-01-20 RX ADMIN — Medication 3 MILLILITER(S): at 17:33

## 2020-01-20 RX ADMIN — Medication 80 MILLIGRAM(S): at 17:34

## 2020-01-20 RX ADMIN — ATORVASTATIN CALCIUM 40 MILLIGRAM(S): 80 TABLET, FILM COATED ORAL at 21:16

## 2020-01-20 RX ADMIN — Medication 100 MILLIGRAM(S): at 06:40

## 2020-01-20 RX ADMIN — Medication 1200 MILLIGRAM(S): at 17:34

## 2020-01-20 RX ADMIN — Medication 10 MILLIGRAM(S): at 17:35

## 2020-01-20 RX ADMIN — Medication 2: at 13:48

## 2020-01-20 RX ADMIN — Medication 80 MILLIGRAM(S): at 06:40

## 2020-01-20 RX ADMIN — ENOXAPARIN SODIUM 40 MILLIGRAM(S): 100 INJECTION SUBCUTANEOUS at 12:15

## 2020-01-20 RX ADMIN — Medication 10 MILLIGRAM(S): at 06:40

## 2020-01-20 NOTE — PROGRESS NOTE ADULT - SUBJECTIVE AND OBJECTIVE BOX
CC: f/u for pneumonia and confusion    Patient reports: she speaks Wolof,her son is at bedside, she is cooperative and able to follow simple commands.No chest pain, headache or shortness of breath, still with a cough.    REVIEW OF SYSTEMS:  All other review of systems negative (Comprehensive ROS)    Antimicrobials Day #  :day 2  cefTRIAXone   IVPB 1000 milliGRAM(s) IV Intermittent every 24 hours  doxycycline hyclate Capsule 100 milliGRAM(s) Oral every 12 hours    Other Medications Reviewed    T(F): 99 (20 @ 14:41), Max: 99.1 (20 @ 17:14)  HR: 82 (20 @ 14:41)  BP: 157/73 (20 @ 14:41)  RR: 18 (20 @ 14:41)  SpO2: 95% (20 @ 14:41)  Wt(kg): --    PHYSICAL EXAM:  General: alert, no acute distress  Eyes:  anicteric, no conjunctival injection, no discharge  Oropharynx: no lesions or injection 	  Neck: supple, without adenopathy  Lungs: coarse BS  Heart: regular rate and rhythm; no murmur, rubs or gallops  Abdomen: soft, nondistended, nontender, without mass or organomegaly  Skin: no lesions  Extremities: no clubbing, cyanosis, or edema  Neurologic: alert, oriented, moves all extremities    LAB RESULTS:                        11.7   11.78 )-----------( 240      ( 2020 08:43 )             34.7         142  |  103  |  11  ----------------------------<  115<H>  3.6   |  24  |  0.76    Ca    8.9      2020 07:19    TPro  6.8  /  Alb  2.9<L>  /  TBili  0.5  /  DBili  x   /  AST  33  /  ALT  41  /  AlkPhos  83      LIVER FUNCTIONS - ( 2020 07:19 )  Alb: 2.9 g/dL / Pro: 6.8 g/dL / ALK PHOS: 83 U/L / ALT: 41 U/L / AST: 33 U/L / GGT: x           Urinalysis Basic - ( 2020 17:57 )    Color: Light Yellow / Appearance: Clear / S.023 / pH: x  Gluc: x / Ketone: Trace  / Bili: Negative / Urobili: Negative   Blood: x / Protein: 30 mg/dL / Nitrite: Negative   Leuk Esterase: Negative / RBC: 3 /hpf / WBC 6 /HPF   Sq Epi: x / Non Sq Epi: 1 /hpf / Bacteria: Negative      MICROBIOLOGY:  RECENT CULTURES:   @ 20:14 .Blood Blood     No growth to date.       @ 20:00 .Urine Clean Catch (Midstream)     No growth          RADIOLOGY REVIEWED:    < from: CT Chest No Cont (20 @ 19:53) >  IMPRESSION:     Bibasilar parenchymal infiltrates.    < end of copied text >

## 2020-01-20 NOTE — CONSULT NOTE ADULT - PROBLEM SELECTOR RECOMMENDATION 2
Bilateral infiltrates on CT chest   -RVP negative  -Urine legionella negative  -c/w Ceftriaxone  -Duoneb q6  -Mucinex BID   -Encouraged secretion clearance Bilateral infiltrates on CT chest   -RVP negative  -Urine legionella negative  -c/w Ceftriaxone/doxy as per id  -Duoneb q6  -Mucinex BID   -Encouraged secretion clearance

## 2020-01-20 NOTE — PROGRESS NOTE ADULT - ASSESSMENT
78 yo femal with HTN,DM, and A.Fib admitted with a cough x 2 weeks and 3 days of confusion,  Patient and son not aware of any fever.  Viral URI's have been circulating in household.  She denies any headache and neuro feels confusion is toxic metabolic.  CT scan confirms pneumonia.  RVP is negative, legionella urine antigen is negative  PC elevated at .42  Son reports improvement in confusion  Suggest:  1.continue CTX and doxy  2.Await additional incubation of blood cultures  3.If improvement continues she will be a candidate for conversion to oral antibiotics in next 24-48 hours

## 2020-01-20 NOTE — CONSULT NOTE ADULT - PROBLEM SELECTOR RECOMMENDATION 9
Mild hypoxia 2nd PNA  -Keep sp02>92% on supplemental oxygen  -Encouraged secretion clearance Mild hypoxia 2nd PNA  -Keep sp02>90% on supplemental oxygen as needed  -pt to sleep with 2l nc  -Encouraged secretion clearance

## 2020-01-20 NOTE — PROGRESS NOTE ADULT - SUBJECTIVE AND OBJECTIVE BOX
Patient is a 79y old  Female who presents with a chief complaint of Confusion (2020 16:25)      SUBJECTIVE / OVERNIGHT EVENTS: feels better.  Review of Systems  chest pain no  palpitations no  sob no  nausea no  headache no    MEDICATIONS  (STANDING):  albuterol/ipratropium for Nebulization 3 milliLiter(s) Nebulizer every 6 hours  atorvastatin 40 milliGRAM(s) Oral at bedtime  cefTRIAXone   IVPB 1000 milliGRAM(s) IV Intermittent every 24 hours  dextrose 5%. 1000 milliLiter(s) (50 mL/Hr) IV Continuous <Continuous>  dextrose 50% Injectable 12.5 Gram(s) IV Push once  dextrose 50% Injectable 25 Gram(s) IV Push once  dextrose 50% Injectable 25 Gram(s) IV Push once  doxycycline hyclate Capsule 100 milliGRAM(s) Oral every 12 hours  enalapril 10 milliGRAM(s) Oral two times a day  enoxaparin Injectable 40 milliGRAM(s) SubCutaneous daily  guaiFENesin ER 1200 milliGRAM(s) Oral every 12 hours  insulin lispro (HumaLOG) corrective regimen sliding scale   SubCutaneous three times a day before meals  insulin lispro (HumaLOG) corrective regimen sliding scale   SubCutaneous at bedtime  sotalol 80 milliGRAM(s) Oral two times a day    MEDICATIONS  (PRN):  acetaminophen   Tablet .. 650 milliGRAM(s) Oral every 6 hours PRN Mild Pain (1 - 3), Moderate Pain (4 - 6)  dextrose 40% Gel 15 Gram(s) Oral once PRN Blood Glucose LESS THAN 70 milliGRAM(s)/deciliter  glucagon  Injectable 1 milliGRAM(s) IntraMuscular once PRN Glucose LESS THAN 70 milligrams/deciliter      Vital Signs Last 24 Hrs  T(C): 37.2 (2020 17:26), Max: 37.2 (2020 14:41)  T(F): 99 (2020 17:26), Max: 99 (2020 14:41)  HR: 84 (2020 17:26) (73 - 87)  BP: 167/74 (2020 17:26) (123/73 - 171/77)  BP(mean): --  RR: 18 (2020 17:26) (18 - 18)  SpO2: 93% (2020 17:26) (93% - 95%)    PHYSICAL EXAM:  GENERAL: NAD, well-developed  HEAD:  Atraumatic, Normocephalic  EYES: EOMI, PERRLA, conjunctiva and sclera clear  NECK: Supple, No JVD  CHEST/LUNG: Clear to auscultation bilaterally; No wheeze  HEART: Regular rate and rhythm; No murmurs, rubs, or gallops  ABDOMEN: Soft, Nontender, Nondistended; Bowel sounds present  EXTREMITIES:  2+ Peripheral Pulses, No clubbing, cyanosis, or edema  PSYCH: AAOx3  NEUROLOGY: non-focal  SKIN: No rashes or lesions    LABS:                        11.7   11.78 )-----------( 240      ( 2020 08:43 )             34.7         142  |  103  |  11  ----------------------------<  115<H>  3.6   |  24  |  0.76    Ca    8.9      2020 07:19    TPro  6.8  /  Alb  2.9<L>  /  TBili  0.5  /  DBili  x   /  AST  33  /  ALT  41  /  AlkPhos  83            Urinalysis Basic - ( 2020 17:57 )    Color: Light Yellow / Appearance: Clear / S.023 / pH: x  Gluc: x / Ketone: Trace  / Bili: Negative / Urobili: Negative   Blood: x / Protein: 30 mg/dL / Nitrite: Negative   Leuk Esterase: Negative / RBC: 3 /hpf / WBC 6 /HPF   Sq Epi: x / Non Sq Epi: 1 /hpf / Bacteria: Negative        Culture - Blood (collected 2020 20:14)  Source: .Blood Blood-Venous  Preliminary Report (2020 21:01):    No growth to date.    Culture - Blood (collected 2020 20:14)  Source: .Blood Blood  Preliminary Report (2020 21:01):    No growth to date.    Culture - Urine (collected 2020 20:00)  Source: .Urine Clean Catch (Midstream)  Final Report (2020 14:53):    No growth        RADIOLOGY & ADDITIONAL TESTS:    Imaging Personally Reviewed:  < from: CT Head No Cont (20 @ 17:40) >  Head CT: There is no acute intracranial hemorrhage, mass effect, shift of the midline structures, herniation, extra-axial fluid collection, or hydrocephalus.    < end of copied text >  < from: CT Angio Neck w/ IV Cont (20 @ 17:40) >    IMPRESSION:    Head CT: No acute intracranial hemorrhage, mass effect, or shift of the midline structures.    CTA neck: Atherosclerosis involving the left carotid bifurcation with complete occlusion of the leftinternal carotid artery extending to the skull base. This is of unknown timeframe.    Otherwise no large vessel occlusion or major stenosis.    CTA head: Continuation of occlusion of the left intracranial internal carotid artery to the clinoid/supraclinoid junction with constitution through the [place] at this level.    Mild focal stenosis involving the right supraclinoid intracranial internal carotid artery.    Otherwise no large vessel occlusion or major stenosis.    If clinical symptoms are new and persistent, consider further evaluation with brain MRI examination, if there are no MRI contraindications.      < end of copied text >  < from: CT Chest No Cont (20 @ 19:53) >  IMPRESSION:     Bibasilar parenchymal infiltrates.    < end of copied text >    Consultant(s) Notes Reviewed:      Care Discussed with Consultants/Other Providers:

## 2020-01-20 NOTE — CONSULT NOTE ADULT - ASSESSMENT
78 y/o F (Lithuanian speaking) with PMH of HTN, T2DM, significant carotid stenosis, and atrial fibrillation referred by PMD Dr. Tatum for confusion which began acutely on 1/16. +congested, nonproductive cough. CT chest with bilateral PNA. Granddaughter at bedside providing translation, states confusion is improved but still not baseline CT head negative, CT neck with 100% L ICA occlusion which is chronic per granddaughter. Currently 91-92% on RA.
Assessment: 80yo Croatian speaking woman with h/o HTN, DMII, b/l carotid stenosis with known L ICA occlusion (2014), atrial fibrillation (not on AC for unclear reason only on aspirin) admitted for pneumonia with Neurology consult for 3-4 days of confusion. Currently patient is returning to baseline but is not completely back to baseline. No Neuroimaging at this time.    Impression: toxic metabolic encephalopathy in the setting of pneumonia, low suspicion for stroke (though noted she has risk factors such as afib not on AC for unclear reasons) versus vascular dementia (difficult to say without neuroimaging), low suspicion for seizures/nonconvulsive/subclinical seizures    Plan:  Imaging/Studies:   [ ] CTH noncon  [ ] CTA head & neck w/w/o contrast  [ ] MRI brain w/o contrast if no contraindications  [ ] routine EEG  [ ] may need to consider telemetry monitoring given h/o afib    Labs:   [ ] infectious workup per primary team - she has known pneumonia which is being treated  [ ] thyroid function tests - appear normal at this time  [ ] vit B12, folate, methylmalonic acid    Meds:   [ ] c/w meds  [ ] AC needs to be addressed    Consults:   [ ] Cardiology for recommendations regarding AC and patient's ?afib    -Management & disposition to be discussed with Dr. Chahal

## 2020-01-20 NOTE — CONSULT NOTE ADULT - SUBJECTIVE AND OBJECTIVE BOX
PULMONARY CONSULT    HPI: 78 y/o F (Turkish speaking) with PMH of HTN, T2DM, significant carotid stenosis, and atrial fibrillation referred by PMD Dr. Tatum for confusion which began acutely on . +congested, nonproductive cough. CT chest with bilateral PNA. Granddaugther at bedside providing translation, states confusion is improved but still not basline. CT head negative, CT neck with 100% L ICA occlusion which is chronic per granddaughter. Currently 91-92% on RA.       PAST MEDICAL & SURGICAL HISTORY:  Atrial fibrillation  Diabetes mellitus, type II  Hypercholesteremia  HTN (hypertension)  Stenosis of carotid artery, bilateral  S/P cataract extraction, right  S/P appendectomy    Allergies    No Known Allergies    Intolerances      FAMILY HISTORY:  Family history of hypercholesterolemia  Family history of hypertension    Social history: Never smoker     Review of Systems:  CONSTITUTIONAL: No fever, chills, or fatigue  EYES: No eye pain, visual disturbances, or discharge  ENMT:  No difficulty hearing, tinnitus, vertigo; No sinus or throat pain  NECK: No pain or stiffness  RESPIRATORY: Per above  CARDIOVASCULAR: No chest pain, palpitations, dizziness, or leg swelling  GASTROINTESTINAL: No abdominal or epigastric pain. No nausea, vomiting, or hematemesis; No diarrhea or constipation. No melena or hematochezia.  GENITOURINARY: No dysuria, frequency, hematuria, or incontinence  NEUROLOGICAL: No headaches, memory loss, loss of strength, numbness, or tremors  SKIN: No itching, burning, rashes, or lesions   MUSCULOSKELETAL: No joint pain or swelling; No muscle, back, or extremity pain  PSYCHIATRIC: No depression, anxiety, mood swings, or difficulty sleeping      Medications:  MEDICATIONS  (STANDING):  albuterol/ipratropium for Nebulization 3 milliLiter(s) Nebulizer every 6 hours  atorvastatin 40 milliGRAM(s) Oral at bedtime  cefTRIAXone   IVPB 1000 milliGRAM(s) IV Intermittent every 24 hours  dextrose 5%. 1000 milliLiter(s) (50 mL/Hr) IV Continuous <Continuous>  dextrose 50% Injectable 12.5 Gram(s) IV Push once  dextrose 50% Injectable 25 Gram(s) IV Push once  dextrose 50% Injectable 25 Gram(s) IV Push once  doxycycline hyclate Capsule 100 milliGRAM(s) Oral every 12 hours  enalapril 10 milliGRAM(s) Oral two times a day  enoxaparin Injectable 40 milliGRAM(s) SubCutaneous daily  guaiFENesin ER 1200 milliGRAM(s) Oral every 12 hours  insulin lispro (HumaLOG) corrective regimen sliding scale   SubCutaneous three times a day before meals  insulin lispro (HumaLOG) corrective regimen sliding scale   SubCutaneous at bedtime  sotalol 80 milliGRAM(s) Oral two times a day    MEDICATIONS  (PRN):  acetaminophen   Tablet .. 650 milliGRAM(s) Oral every 6 hours PRN Mild Pain (1 - 3), Moderate Pain (4 - 6)  dextrose 40% Gel 15 Gram(s) Oral once PRN Blood Glucose LESS THAN 70 milliGRAM(s)/deciliter  glucagon  Injectable 1 milliGRAM(s) IntraMuscular once PRN Glucose LESS THAN 70 milligrams/deciliter            Vital Signs Last 24 Hrs  T(C): 37.1 (2020 10:53), Max: 37.3 (2020 17:14)  T(F): 98.7 (2020 10:53), Max: 99.1 (2020 17:14)  HR: 80 (2020 10:53) (73 - 87)  BP: 123/73 (2020 10:53) (123/73 - 171/77)  BP(mean): --  RR: 18 (2020 10:53) (18 - 18)  SpO2: 95% (2020 10:53) (93% - 95%) on ra      VBG pH 7.40  @ 17:48  VBG pCO2 43  @ 17:48  VBG O2 sat 33  @ 17:48  VBG lactate 1.6  @ 17:48         @ 07:01  -  -20 @ 07:00  --------------------------------------------------------  IN: 710 mL / OUT: 550 mL / NET: 160 mL          LABS:                        11.7   11.78 )-----------( 240      ( 2020 08:43 )             34.7         142  |  103  |  11  ----------------------------<  115<H>  3.6   |  24  |  0.76    Ca    8.9      2020 07:19    TPro  6.8  /  Alb  2.9<L>  /  TBili  0.5  /  DBili  x   /  AST  33  /  ALT  41  /  AlkPhos  83            CAPILLARY BLOOD GLUCOSE      POCT Blood Glucose.: 233 mg/dL (2020 13:28)      Urinalysis Basic - ( 2020 17:57 )    Color: Light Yellow / Appearance: Clear / S.023 / pH: x  Gluc: x / Ketone: Trace  / Bili: Negative / Urobili: Negative   Blood: x / Protein: 30 mg/dL / Nitrite: Negative   Leuk Esterase: Negative / RBC: 3 /hpf / WBC 6 /HPF   Sq Epi: x / Non Sq Epi: 1 /hpf / Bacteria: Negative      Procalcitonin, Serum: 0.42 ng/mL (20 @ 07:14)                CULTURES: (if applicable)  Culture Results:   No growth to date. ( @ 20:14)  Culture Results:   No growth to date. ( @ 20:14)  Culture Results:   No growth ( @ 20:00)        Physical Examination:    General: No acute distress.      HEENT: Pupils equal, reactive to light.  Symmetric.    PULM: Bilateral exp rhonchi, RUL wheeze    CVS: S1, S2    ABD: Soft, nondistended, nontender, normoactive bowel sounds, no masses    EXT: No edema, nontender    SKIN: Warm and well perfused, no rashes noted.    NEURO: Alert, confused, interactive, nonfocal    RADIOLOGY REVIEWED  CT chest: < from: CT Chest No Cont (20 @ 19:53) >  FINDINGS:    No axillary adenopathy. Small mediastinal lymph nodes. The thyroid is enlarged.    Calcifications of the aortic root and coronary arteries. The heart is normal in size.No pericardial effusion.    No endobronchial lesion. Mosaic attenuation of the lungs, likely air trapping. Parenchymal opacities are noted in both lower lobes. Bilateral parenchymal infiltrates are suspected.  Area of tree-in-bud opacities in the right lower lobe (2:65), likely impacted distal airways. No pneumothorax.    The visualized upper abdomen is unremarkable.    Evaluation of the osseous structures demonstrates degenerative changes of the spine.    IMPRESSION:     Bibasilar parenchymal infiltrates.    < end of copied text >

## 2020-01-20 NOTE — PROGRESS NOTE ADULT - ASSESSMENT
79F (Prydeinig speaking) with PMHx of HTN, T2DM, significant carotid stenosis, and atrial fibrillation referred by PMD Dr. Tatum for confusion and noted fever for three days. Patient's family endorsing sick contacts with family suffering from viral URI. Patient slightly febrile here with minor hypoxic respiratory failure which corrects with 2 L NC. Labs are significant for neutrophilic pre-dominant leukocytosis, slight transaminitis and slight pyuria. CXR showing possible left lower lobe opacity with CT chest being read as bibasilar atelectasis.     There is suspicion that patient has encephalopathy secondary to infectious etiology, possibly the lung. Given her slight acute hypoxic respiratory failure will treat empirically for pneumonia. Patient with no dysuria and only mild pyuria. She has a history of atrial fibrillation and bilateral carotid stenosis, would need to consider CT head and primary neurological issue if she does not improve on antibiotics.     Encephalopathy acute improving .   - Treat as possible infectious etiology with antibiotics  - Neurology evaluation noted    Pneumonia.   - Empiric treatment with Ceftriaxone and Doxy  - Follow up urine legionella  - Follow up blood cultures  - ID evaluation noted  - Pulmonary evaluation Dr. Cabrera noted     Atrial fibrillation.   -Continue with Sotalol (QTc 462)  -Patient not on anti-coagulation now. Will defer to primary care physician.     Diabetes mellitus, type II.   - hold Glipizide while inpatient  - FS TID AC and Insulin sliding scale.     HTN (hypertension).  - Continue with Enalapril 10 mg BID.     Carotid stenosis.   - Continue with Lipitor 40  - Hold Ezetimibe for now.    Pyuria.   - No dysuria, antibiotic for pneumonia should cover urine organisms  - Follow up urine culture.     Transaminitis.  - Continue to trend and avoid hepatotoxic medications  - US gallbladder     d/w patient and granddaughter at bedside    Noe Lacey MD pager [phone]

## 2020-01-21 DIAGNOSIS — R41.0 DISORIENTATION, UNSPECIFIED: ICD-10-CM

## 2020-01-21 LAB
GLUCOSE BLDC GLUCOMTR-MCNC: 169 MG/DL — HIGH (ref 70–99)
GLUCOSE BLDC GLUCOMTR-MCNC: 169 MG/DL — HIGH (ref 70–99)
GLUCOSE BLDC GLUCOMTR-MCNC: 195 MG/DL — HIGH (ref 70–99)
GLUCOSE BLDC GLUCOMTR-MCNC: 234 MG/DL — HIGH (ref 70–99)

## 2020-01-21 PROCEDURE — 76705 ECHO EXAM OF ABDOMEN: CPT | Mod: 26,RT

## 2020-01-21 RX ADMIN — Medication 2: at 16:57

## 2020-01-21 RX ADMIN — Medication 100 MILLIGRAM(S): at 17:08

## 2020-01-21 RX ADMIN — Medication 80 MILLIGRAM(S): at 17:07

## 2020-01-21 RX ADMIN — Medication 1: at 12:02

## 2020-01-21 RX ADMIN — CEFTRIAXONE 100 MILLIGRAM(S): 500 INJECTION, POWDER, FOR SOLUTION INTRAMUSCULAR; INTRAVENOUS at 17:07

## 2020-01-21 RX ADMIN — Medication 10 MILLIGRAM(S): at 11:14

## 2020-01-21 RX ADMIN — Medication 80 MILLIGRAM(S): at 06:26

## 2020-01-21 RX ADMIN — Medication 3 MILLILITER(S): at 06:26

## 2020-01-21 RX ADMIN — Medication 1: at 08:58

## 2020-01-21 RX ADMIN — Medication 3 MILLILITER(S): at 00:49

## 2020-01-21 RX ADMIN — Medication 1200 MILLIGRAM(S): at 17:07

## 2020-01-21 RX ADMIN — ENOXAPARIN SODIUM 40 MILLIGRAM(S): 100 INJECTION SUBCUTANEOUS at 11:18

## 2020-01-21 RX ADMIN — Medication 3 MILLIGRAM(S): at 21:32

## 2020-01-21 RX ADMIN — Medication 1200 MILLIGRAM(S): at 06:26

## 2020-01-21 RX ADMIN — Medication 3 MILLILITER(S): at 11:19

## 2020-01-21 RX ADMIN — Medication 650 MILLIGRAM(S): at 15:18

## 2020-01-21 RX ADMIN — ATORVASTATIN CALCIUM 40 MILLIGRAM(S): 80 TABLET, FILM COATED ORAL at 21:32

## 2020-01-21 RX ADMIN — Medication 20 MILLIGRAM(S): at 17:08

## 2020-01-21 RX ADMIN — Medication 10 MILLIGRAM(S): at 06:26

## 2020-01-21 RX ADMIN — Medication 650 MILLIGRAM(S): at 15:17

## 2020-01-21 RX ADMIN — Medication 3 MILLILITER(S): at 17:07

## 2020-01-21 RX ADMIN — Medication 100 MILLIGRAM(S): at 06:26

## 2020-01-21 NOTE — PROGRESS NOTE ADULT - SUBJECTIVE AND OBJECTIVE BOX
Follow-up Pulm Progress Note    No new respiratory events overnight.   +productive cough, improved from yesterday   90-92% on RA    Medications:  MEDICATIONS  (STANDING):  albuterol/ipratropium for Nebulization 3 milliLiter(s) Nebulizer every 6 hours  atorvastatin 40 milliGRAM(s) Oral at bedtime  cefTRIAXone   IVPB 1000 milliGRAM(s) IV Intermittent every 24 hours  dextrose 5%. 1000 milliLiter(s) (50 mL/Hr) IV Continuous <Continuous>  dextrose 50% Injectable 12.5 Gram(s) IV Push once  dextrose 50% Injectable 25 Gram(s) IV Push once  dextrose 50% Injectable 25 Gram(s) IV Push once  doxycycline hyclate Capsule 100 milliGRAM(s) Oral every 12 hours  enalapril 20 milliGRAM(s) Oral two times a day  enoxaparin Injectable 40 milliGRAM(s) SubCutaneous daily  guaiFENesin ER 1200 milliGRAM(s) Oral every 12 hours  insulin lispro (HumaLOG) corrective regimen sliding scale   SubCutaneous three times a day before meals  insulin lispro (HumaLOG) corrective regimen sliding scale   SubCutaneous at bedtime  melatonin 3 milliGRAM(s) Oral at bedtime  sotalol 80 milliGRAM(s) Oral two times a day    MEDICATIONS  (PRN):  acetaminophen   Tablet .. 650 milliGRAM(s) Oral every 6 hours PRN Mild Pain (1 - 3), Moderate Pain (4 - 6)  dextrose 40% Gel 15 Gram(s) Oral once PRN Blood Glucose LESS THAN 70 milliGRAM(s)/deciliter  glucagon  Injectable 1 milliGRAM(s) IntraMuscular once PRN Glucose LESS THAN 70 milligrams/deciliter          Vital Signs Last 24 Hrs  T(C): 37.1 (21 Jan 2020 11:02), Max: 37.5 (20 Jan 2020 21:33)  T(F): 98.8 (21 Jan 2020 11:02), Max: 99.5 (20 Jan 2020 21:33)  HR: 70 (21 Jan 2020 12:05) (70 - 84)  BP: 162/76 (21 Jan 2020 12:05) (106/79 - 170/91)  BP(mean): --  RR: 18 (21 Jan 2020 12:05) (18 - 18)  SpO2: 94% (21 Jan 2020 12:05) (92% - 95%) on RA          01-20 @ 07:01  -  01-21 @ 07:00  --------------------------------------------------------  IN: 960 mL / OUT: 0 mL / NET: 960 mL          LABS:                        11.7   11.78 )-----------( 240      ( 20 Jan 2020 08:43 )             34.7     01-20    142  |  103  |  11  ----------------------------<  115<H>  3.6   |  24  |  0.76    Ca    8.9      20 Jan 2020 07:19    TPro  6.8  /  Alb  2.9<L>  /  TBili  0.5  /  DBili  x   /  AST  33  /  ALT  41  /  AlkPhos  83  01-20          CAPILLARY BLOOD GLUCOSE      POCT Blood Glucose.: 169 mg/dL (21 Jan 2020 11:49)        Procalcitonin, Serum: 0.42 ng/mL (01-19-20 @ 07:14)                  CULTURES: (if applicable)  Culture Results:   No growth to date. (01-18 @ 20:14)  Culture Results:   No growth to date. (01-18 @ 20:14)  Culture Results:   No growth (01-18 @ 20:00)    Most recent blood culture -- 01-18 @ 20:14   -- -- .Blood Blood 01-18 @ 20:14  Most recent blood culture -- 01-18 @ 20:00   -- -- .Urine Clean Catch (Midstream) 01-18 @ 20:00        Physical Examination:  PULM: Crackles bibasilar, much improved from yesterday   CVS: S1, S2 heard    RADIOLOGY REVIEWED  CT chest: < from: CT Chest No Cont (01.18.20 @ 19:53) >  FINDINGS:    No axillary adenopathy. Small mediastinal lymph nodes. The thyroid is enlarged.    Calcifications of the aortic root and coronary arteries. The heart is normal in size.No pericardial effusion.    No endobronchial lesion. Mosaic attenuation of the lungs, likely air trapping. Parenchymal opacities are noted in both lower lobes. Bilateral parenchymal infiltrates are suspected.  Area of tree-in-bud opacities in the right lower lobe (2:65), likely impacted distal airways. No pneumothorax.    The visualized upper abdomen is unremarkable.    Evaluation of the osseous structures demonstrates degenerative changes of the spine.    IMPRESSION:     Bibasilar parenchymal infiltrates.    < end of copied text >

## 2020-01-21 NOTE — PROGRESS NOTE ADULT - SUBJECTIVE AND OBJECTIVE BOX
Patient is a 79y old  Female who presents with a chief complaint of Confusion (21 Jan 2020 14:07)      SUBJECTIVE / OVERNIGHT EVENTS: feels better. daughter at bedside  Review of Systems  chest pain no  palpitations no  sob improving   nausea no  headache no    MEDICATIONS  (STANDING):  albuterol/ipratropium for Nebulization 3 milliLiter(s) Nebulizer every 6 hours  atorvastatin 40 milliGRAM(s) Oral at bedtime  cefTRIAXone   IVPB 1000 milliGRAM(s) IV Intermittent every 24 hours  dextrose 5%. 1000 milliLiter(s) (50 mL/Hr) IV Continuous <Continuous>  dextrose 50% Injectable 12.5 Gram(s) IV Push once  dextrose 50% Injectable 25 Gram(s) IV Push once  dextrose 50% Injectable 25 Gram(s) IV Push once  doxycycline hyclate Capsule 100 milliGRAM(s) Oral every 12 hours  enalapril 20 milliGRAM(s) Oral two times a day  enoxaparin Injectable 40 milliGRAM(s) SubCutaneous daily  guaiFENesin ER 1200 milliGRAM(s) Oral every 12 hours  insulin lispro (HumaLOG) corrective regimen sliding scale   SubCutaneous three times a day before meals  insulin lispro (HumaLOG) corrective regimen sliding scale   SubCutaneous at bedtime  melatonin 3 milliGRAM(s) Oral at bedtime  sotalol 80 milliGRAM(s) Oral two times a day    MEDICATIONS  (PRN):  acetaminophen   Tablet .. 650 milliGRAM(s) Oral every 6 hours PRN Mild Pain (1 - 3), Moderate Pain (4 - 6)  dextrose 40% Gel 15 Gram(s) Oral once PRN Blood Glucose LESS THAN 70 milliGRAM(s)/deciliter  glucagon  Injectable 1 milliGRAM(s) IntraMuscular once PRN Glucose LESS THAN 70 milligrams/deciliter      Vital Signs Last 24 Hrs  T(C): 36.9 (21 Jan 2020 16:53), Max: 37.5 (20 Jan 2020 21:33)  T(F): 98.5 (21 Jan 2020 16:53), Max: 99.5 (20 Jan 2020 21:33)  HR: 80 (21 Jan 2020 16:53) (70 - 80)  BP: 162/81 (21 Jan 2020 16:53) (106/79 - 170/91)  BP(mean): --  RR: 18 (21 Jan 2020 16:53) (18 - 18)  SpO2: 94% (21 Jan 2020 16:53) (92% - 94%)    PHYSICAL EXAM:  GENERAL: NAD, well-developed  HEAD:  Atraumatic, Normocephalic  EYES: EOMI, PERRLA, conjunctiva and sclera clear  NECK: Supple, No JVD  CHEST/LUNG: few basilar rhonchi to auscultation bilaterally; No wheeze  HEART: Regular rate and rhythm; No murmurs, rubs, or gallops  ABDOMEN: Soft, Nontender, Nondistended; Bowel sounds present  EXTREMITIES:  2+ Peripheral Pulses, No clubbing, cyanosis, or edema  PSYCH: more alert  NEUROLOGY: non-focal  SKIN: No rashes or lesions    LABS:                        11.7   11.78 )-----------( 240      ( 20 Jan 2020 08:43 )             34.7     01-20    142  |  103  |  11  ----------------------------<  115<H>  3.6   |  24  |  0.76    Ca    8.9      20 Jan 2020 07:19    TPro  6.8  /  Alb  2.9<L>  /  TBili  0.5  /  DBili  x   /  AST  33  /  ALT  41  /  AlkPhos  83  01-20              Culture - Blood (collected 18 Jan 2020 20:14)  Source: .Blood Blood-Venous  Preliminary Report (19 Jan 2020 21:01):    No growth to date.    Culture - Blood (collected 18 Jan 2020 20:14)  Source: .Blood Blood  Preliminary Report (19 Jan 2020 21:01):    No growth to date.    Culture - Urine (collected 18 Jan 2020 20:00)  Source: .Urine Clean Catch (Midstream)  Final Report (19 Jan 2020 14:53):    No growth        RADIOLOGY & ADDITIONAL TESTS:    Imaging Personally Reviewed:    Consultant(s) Notes Reviewed:      Care Discussed with Consultants/Other Providers:

## 2020-01-21 NOTE — PROVIDER CONTACT NOTE (OTHER) - ACTION/TREATMENT ORDERED:
EVELIA Quigley made aware. As per EVELIA Quigley give patient 10mg of EVELIA Quigley made aware. As per EVELIA Quigley give patient 10mg of enalapril. Will cont to monitor. EVELIA Henry made aware. As per EVELIA Henry give patient 10mg of enalapril. Will cont to monitor.

## 2020-01-21 NOTE — PROGRESS NOTE ADULT - PROBLEM SELECTOR PLAN 3
Improved since admission per daughter but still on baseline  -Likely metabolic encephalopathy from sepsis   -Seen by neurology yesterday though not documented. Deferring on MRI at this time   -No obvious CVA/hemorrhage/mass on CT head

## 2020-01-21 NOTE — PROGRESS NOTE ADULT - PROBLEM SELECTOR PLAN 2
Bilateral infiltrates on CT chest   -RVP negative  -Urine legionella negative  -c/w Ceftriaxone/doxy as per id  -Duoneb q6  -Mucinex BID   -Encouraged secretion clearance.  -f/u Mycoplasma IgM

## 2020-01-21 NOTE — PROGRESS NOTE ADULT - SUBJECTIVE AND OBJECTIVE BOX
CC: f/u for confusion and cough    Patient reports: she still has cough.Her daughter thinks she is still confused.No headache or chest discomfort.No GI or Gu symptoms.No dyspnea    REVIEW OF SYSTEMS:  All other review of systems negative (Comprehensive ROS)    Antimicrobials Day #  :day 3  cefTRIAXone   IVPB 1000 milliGRAM(s) IV Intermittent every 24 hours  doxycycline hyclate Capsule 100 milliGRAM(s) Oral every 12 hours    Other Medications Reviewed    T(F): 98.8 (01-21-20 @ 11:02), Max: 99.5 (01-20-20 @ 21:33)  HR: 70 (01-21-20 @ 11:02)  BP: 170/91 (01-21-20 @ 11:02)  RR: 18 (01-21-20 @ 11:02)  SpO2: 93% (01-21-20 @ 11:02)  Wt(kg): --    PHYSICAL EXAM:  General: alert, no acute distress  Eyes:  anicteric, no conjunctival injection, no discharge  Oropharynx: no lesions or injection 	  Neck: supple, without adenopathy  Lungs: clear to auscultation  Heart: regular rate and rhythm; no murmur, rubs or gallops  Abdomen: soft, nondistended, nontender, without mass or organomegaly  Skin: no lesions  Extremities: no clubbing, cyanosis, or edema  Neurologic: alert, oriented, moves all extremities    LAB RESULTS:                        11.7   11.78 )-----------( 240      ( 20 Jan 2020 08:43 )             34.7     01-20    142  |  103  |  11  ----------------------------<  115<H>  3.6   |  24  |  0.76    Ca    8.9      20 Jan 2020 07:19    TPro  6.8  /  Alb  2.9<L>  /  TBili  0.5  /  DBili  x   /  AST  33  /  ALT  41  /  AlkPhos  83  01-20    LIVER FUNCTIONS - ( 20 Jan 2020 07:19 )  Alb: 2.9 g/dL / Pro: 6.8 g/dL / ALK PHOS: 83 U/L / ALT: 41 U/L / AST: 33 U/L / GGT: x             MICROBIOLOGY:  RECENT CULTURES:  01-18 @ 20:14 .Blood Blood     No growth to date.      01-18 @ 20:00 .Urine Clean Catch (Midstream)     No growth          RADIOLOGY REVIEWED:    < from: US Abdomen Upper Quadrant Right (01.21.20 @ 09:53) >  IMPRESSION:     No cholelithiasis or biliary ductal dilatation.    Mildly increased cortical echogenicity ofthe right kidney.    Trace right pleural effusion.    < end of copied text >  < from: CT Angio Head w/ IV Cont (01.19.20 @ 17:40) >  IMPRESSION:    Head CT: No acute intracranial hemorrhage, mass effect, or shift of the midline structures.    CTA neck: Atherosclerosis involving the left carotid bifurcation with complete occlusion of the leftinternal carotid artery extending to the skull base. This is of unknown timeframe.    Otherwise no large vessel occlusion or major stenosis.    CTA head: Continuation of occlusion of the left intracranial internal carotid artery to the clinoid/supraclinoid junction with constitution through the [place] at this level.    Mild focal stenosis involving the right supraclinoid intracranial internal carotid artery.    Otherwise no large vessel occlusion or major stenosis.    If clinical symptoms are new and persistent, consider further evaluation with brain MRI examination, if there are no MRI contraindications.    < end of copied text >

## 2020-01-21 NOTE — PROGRESS NOTE ADULT - ASSESSMENT
79F (Gibraltarian speaking) with PMHx of HTN, T2DM, significant carotid stenosis, and atrial fibrillation referred by PMD Dr. Tatum for confusion and noted fever for three days. Patient's family endorsing sick contacts with family suffering from viral URI. Patient slightly febrile here with minor hypoxic respiratory failure which corrects with 2 L NC. Labs are significant for neutrophilic pre-dominant leukocytosis, slight transaminitis and slight pyuria. CXR showing possible left lower lobe opacity with CT chest being read as bibasilar atelectasis.     There is suspicion that patient has encephalopathy secondary to infectious etiology, possibly the lung. Given her slight acute hypoxic respiratory failure will treat empirically for pneumonia. Patient with no dysuria and only mild pyuria. She has a history of atrial fibrillation and bilateral carotid stenosis, would need to consider CT head and primary neurological issue if she does not improve on antibiotics.     Encephalopathy acute improving .   - Treat as possible infectious etiology with antibiotics  - Neurology evaluation noted    Pneumonia.   - Empiric treatment with Ceftriaxone and Doxy  - Follow up urine legionella, Mycoplasma serology  - Follow up blood cultures  - ID follow noted  - Pulmonary follow     Atrial fibrillation.   -Continue with Sotalol (QTc 462)  -Patient not on anti-coagulation now. Will defer to primary care physician.     Diabetes mellitus, type II.   - hold Glipizide while inpatient  - FS TID AC and Insulin sliding scale.     HTN (hypertension).  - Continue with Enalapril 10 mg BID.     Carotid stenosis.   - Continue with Lipitor 40  - Hold Ezetimibe for now.    Pyuria.   - No dysuria, antibiotic for pneumonia should cover urine organisms  - Follow up urine culture.     Transaminitis.  - Continue to trend and avoid hepatotoxic medication    d/w patient and granddaughter at bedside    Noe Lacey MD pager [phone] 79F (Guatemalan speaking) with PMHx of HTN, T2DM, significant carotid stenosis, and atrial fibrillation referred by PMD Dr. Tatum for confusion and noted fever for three days. Patient's family endorsing sick contacts with family suffering from viral URI. Patient slightly febrile here with minor hypoxic respiratory failure which corrects with 2 L NC. Labs are significant for neutrophilic pre-dominant leukocytosis, slight transaminitis and slight pyuria. CXR showing possible left lower lobe opacity with CT chest being read as bibasilar atelectasis.     There is suspicion that patient has encephalopathy secondary to infectious etiology, possibly the lung. Given her slight acute hypoxic respiratory failure will treat empirically for pneumonia. Patient with no dysuria and only mild pyuria. She has a history of atrial fibrillation and bilateral carotid stenosis, would need to consider CT head and primary neurological issue if she does not improve on antibiotics.     Encephalopathy acute improving .   - Treat as possible infectious etiology with antibiotics  - Neurology evaluation noted    Pneumonia.   - Empiric treatment with Ceftriaxone and Doxy  - Follow up urine legionella, Mycoplasma serology  - Follow up blood cultures  - ID follow noted  - Pulmonary follow     Atrial fibrillation.   -Continue with Sotalol (QTc 462)  -Patient not on anti-coagulation now. Will defer to primary care physician.     Diabetes mellitus, type II.   - hold Glipizide while inpatient  - FS TID AC and Insulin sliding scale.     HTN (hypertension).  - Increase Enalapril 20 mg BID.     Carotid stenosis.   - Continue with Lipitor 40  - Hold Ezetimibe for now.    Pyuria.   - No dysuria, antibiotic for pneumonia should cover urine organisms  - Follow up urine culture.     Transaminitis.  - Continue to trend and avoid hepatotoxic medication    d/w patient and daughter at bedside    Noe Lacey MD pager [phone]

## 2020-01-21 NOTE — PROGRESS NOTE ADULT - ASSESSMENT
78 y/o F (Belarusian speaking) with PMH of HTN, T2DM, significant carotid stenosis, and atrial fibrillation referred by PMD Dr. Tatum for confusion which began acutely on 1/16. +congested, nonproductive cough. CT chest with bilateral PNA. Granddaughter at bedside providing translation, states confusion is improved but still not baseline CT head negative, CT neck with 100% L ICA occlusion which is chronic per granddaughter.

## 2020-01-21 NOTE — PROGRESS NOTE ADULT - ASSESSMENT
80 yo femal with HTN,DM, and A.Fib admitted with a cough x 2 weeks and 3 days of confusion,  Patient and son not aware of any fever.  Viral URI's have been circulating in household.  She denies any headache and neuro feels confusion is toxic metabolic.  CT scan sugests pneumonia.  RVP is negative, legionella urine antigen is negative  PC elevated at .42  Marginal improvement in confusion.  WBC decreased to 11,000 from 13,000  Suggest:  1.continue CTX and doxy, day 3 of 5-7 day course  2.Await additional incubation of blood cultures  3.If improvement continues she will be a candidate for conversion to oral antibiotics in next 24-48 hours  4.? Additional neuro w/u if improvement does not continue  5.Will order mycoplasma serology, can be associated with CNS changes

## 2020-01-21 NOTE — PROGRESS NOTE ADULT - PROBLEM SELECTOR PLAN 1
Mild hypoxia 2nd PNA  -Keep sp02>90% on supplemental oxygen as needed, patient should sleep on 2L NC  -Encouraged secretion clearance.  -Incentive spirometer  -Patient is ambulating well in halls

## 2020-01-22 LAB
ANION GAP SERPL CALC-SCNC: 17 MMOL/L — SIGNIFICANT CHANGE UP (ref 5–17)
BUN SERPL-MCNC: 15 MG/DL — SIGNIFICANT CHANGE UP (ref 7–23)
CALCIUM SERPL-MCNC: 8.6 MG/DL — SIGNIFICANT CHANGE UP (ref 8.4–10.5)
CHLORIDE SERPL-SCNC: 96 MMOL/L — SIGNIFICANT CHANGE UP (ref 96–108)
CO2 SERPL-SCNC: 21 MMOL/L — LOW (ref 22–31)
CREAT SERPL-MCNC: 0.67 MG/DL — SIGNIFICANT CHANGE UP (ref 0.5–1.3)
GLUCOSE BLDC GLUCOMTR-MCNC: 187 MG/DL — HIGH (ref 70–99)
GLUCOSE BLDC GLUCOMTR-MCNC: 211 MG/DL — HIGH (ref 70–99)
GLUCOSE BLDC GLUCOMTR-MCNC: 212 MG/DL — HIGH (ref 70–99)
GLUCOSE BLDC GLUCOMTR-MCNC: 231 MG/DL — HIGH (ref 70–99)
GLUCOSE SERPL-MCNC: 221 MG/DL — HIGH (ref 70–99)
HBA1C BLD-MCNC: 7.8 % — HIGH (ref 4–5.6)
HCT VFR BLD CALC: 37 % — SIGNIFICANT CHANGE UP (ref 34.5–45)
HGB BLD-MCNC: 11.9 G/DL — SIGNIFICANT CHANGE UP (ref 11.5–15.5)
MCHC RBC-ENTMCNC: 29.5 PG — SIGNIFICANT CHANGE UP (ref 27–34)
MCHC RBC-ENTMCNC: 32.2 GM/DL — SIGNIFICANT CHANGE UP (ref 32–36)
MCV RBC AUTO: 91.6 FL — SIGNIFICANT CHANGE UP (ref 80–100)
PLATELET # BLD AUTO: 279 K/UL — SIGNIFICANT CHANGE UP (ref 150–400)
POTASSIUM SERPL-MCNC: 3.7 MMOL/L — SIGNIFICANT CHANGE UP (ref 3.5–5.3)
POTASSIUM SERPL-SCNC: 3.7 MMOL/L — SIGNIFICANT CHANGE UP (ref 3.5–5.3)
RBC # BLD: 4.04 M/UL — SIGNIFICANT CHANGE UP (ref 3.8–5.2)
RBC # FLD: 12.7 % — SIGNIFICANT CHANGE UP (ref 10.3–14.5)
SODIUM SERPL-SCNC: 134 MMOL/L — LOW (ref 135–145)
WBC # BLD: 9.8 K/UL — SIGNIFICANT CHANGE UP (ref 3.8–10.5)
WBC # FLD AUTO: 9.8 K/UL — SIGNIFICANT CHANGE UP (ref 3.8–10.5)

## 2020-01-22 RX ORDER — LANOLIN ALCOHOL/MO/W.PET/CERES
3 CREAM (GRAM) TOPICAL ONCE
Refills: 0 | Status: COMPLETED | OUTPATIENT
Start: 2020-01-22 | End: 2020-01-22

## 2020-01-22 RX ADMIN — Medication 650 MILLIGRAM(S): at 23:35

## 2020-01-22 RX ADMIN — Medication 20 MILLIGRAM(S): at 05:33

## 2020-01-22 RX ADMIN — Medication 80 MILLIGRAM(S): at 18:02

## 2020-01-22 RX ADMIN — Medication 3 MILLILITER(S): at 23:38

## 2020-01-22 RX ADMIN — Medication 2: at 18:03

## 2020-01-22 RX ADMIN — Medication 3 MILLILITER(S): at 18:02

## 2020-01-22 RX ADMIN — Medication 3 MILLILITER(S): at 12:24

## 2020-01-22 RX ADMIN — Medication 3 MILLILITER(S): at 05:32

## 2020-01-22 RX ADMIN — Medication 1200 MILLIGRAM(S): at 18:02

## 2020-01-22 RX ADMIN — Medication 2: at 12:24

## 2020-01-22 RX ADMIN — Medication 20 MILLIGRAM(S): at 18:02

## 2020-01-22 RX ADMIN — Medication 100 MILLIGRAM(S): at 18:02

## 2020-01-22 RX ADMIN — Medication 2: at 08:20

## 2020-01-22 RX ADMIN — Medication 3 MILLIGRAM(S): at 23:36

## 2020-01-22 RX ADMIN — CEFTRIAXONE 100 MILLIGRAM(S): 500 INJECTION, POWDER, FOR SOLUTION INTRAMUSCULAR; INTRAVENOUS at 18:03

## 2020-01-22 RX ADMIN — Medication 100 MILLIGRAM(S): at 05:34

## 2020-01-22 RX ADMIN — Medication 3 MILLILITER(S): at 01:05

## 2020-01-22 RX ADMIN — ATORVASTATIN CALCIUM 40 MILLIGRAM(S): 80 TABLET, FILM COATED ORAL at 21:29

## 2020-01-22 RX ADMIN — ENOXAPARIN SODIUM 40 MILLIGRAM(S): 100 INJECTION SUBCUTANEOUS at 12:23

## 2020-01-22 RX ADMIN — Medication 80 MILLIGRAM(S): at 05:34

## 2020-01-22 RX ADMIN — Medication 1200 MILLIGRAM(S): at 05:33

## 2020-01-22 NOTE — PROGRESS NOTE ADULT - PROBLEM SELECTOR PLAN 1
Mild hypoxia 2nd PNA  -Keep sp02>90% on supplemental oxygen as needed, patient should sleep on 2L NC  -Encouraged secretion clearance.  -Incentive spirometer

## 2020-01-22 NOTE — PROGRESS NOTE ADULT - SUBJECTIVE AND OBJECTIVE BOX
Follow-up Pulm Progress Note    Cough much improved  93% on RA  Still confused, oriented x2 currently (not her baseline)    Medications:  MEDICATIONS  (STANDING):  albuterol/ipratropium for Nebulization 3 milliLiter(s) Nebulizer every 6 hours  atorvastatin 40 milliGRAM(s) Oral at bedtime  cefTRIAXone   IVPB 1000 milliGRAM(s) IV Intermittent every 24 hours  dextrose 5%. 1000 milliLiter(s) (50 mL/Hr) IV Continuous <Continuous>  dextrose 50% Injectable 12.5 Gram(s) IV Push once  dextrose 50% Injectable 25 Gram(s) IV Push once  dextrose 50% Injectable 25 Gram(s) IV Push once  doxycycline hyclate Capsule 100 milliGRAM(s) Oral every 12 hours  enalapril 20 milliGRAM(s) Oral two times a day  enoxaparin Injectable 40 milliGRAM(s) SubCutaneous daily  guaiFENesin ER 1200 milliGRAM(s) Oral every 12 hours  insulin lispro (HumaLOG) corrective regimen sliding scale   SubCutaneous three times a day before meals  insulin lispro (HumaLOG) corrective regimen sliding scale   SubCutaneous at bedtime  sotalol 80 milliGRAM(s) Oral two times a day    MEDICATIONS  (PRN):  acetaminophen   Tablet .. 650 milliGRAM(s) Oral every 6 hours PRN Mild Pain (1 - 3), Moderate Pain (4 - 6)  dextrose 40% Gel 15 Gram(s) Oral once PRN Blood Glucose LESS THAN 70 milliGRAM(s)/deciliter  glucagon  Injectable 1 milliGRAM(s) IntraMuscular once PRN Glucose LESS THAN 70 milligrams/deciliter          Vital Signs Last 24 Hrs  T(C): 36.9 (22 Jan 2020 10:21), Max: 37.1 (21 Jan 2020 14:09)  T(F): 98.5 (22 Jan 2020 10:21), Max: 98.8 (21 Jan 2020 14:09)  HR: 77 (22 Jan 2020 10:21) (70 - 80)  BP: 130/74 (22 Jan 2020 10:21) (130/74 - 168/80)  BP(mean): --  RR: 18 (22 Jan 2020 10:21) (18 - 18)  SpO2: 96% (22 Jan 2020 10:21) (93% - 96%) on RA          01-21 @ 07:01  -  01-22 @ 07:00  --------------------------------------------------------  IN: 1660 mL / OUT: 0 mL / NET: 1660 mL          LABS:                        11.9   9.80  )-----------( 279      ( 22 Jan 2020 08:25 )             37.0     01-22    134<L>  |  96  |  15  ----------------------------<  221<H>  3.7   |  21<L>  |  0.67    Ca    8.6      22 Jan 2020 07:10            CAPILLARY BLOOD GLUCOSE      POCT Blood Glucose.: 211 mg/dL (22 Jan 2020 12:10)                        CULTURES: (if applicable)  Culture Results:   No growth to date. (01-18 @ 20:14)  Culture Results:   No growth to date. (01-18 @ 20:14)  Culture Results:   No growth (01-18 @ 20:00)    Most recent blood culture -- 01-18 @ 20:14   -- -- .Blood Blood 01-18 @ 20:14  Most recent blood culture -- 01-18 @ 20:00   -- -- .Urine Clean Catch (Midstream) 01-18 @ 20:00        Physical Examination:  PULM: Trace exp wheeze RUL, clears with cough   CVS: S1, S2 heard    RADIOLOGY REVIEWED  CT chest: < from: CT Chest No Cont (01.18.20 @ 19:53) >    FINDINGS:    No axillary adenopathy. Small mediastinal lymph nodes. The thyroid is enlarged.    Calcifications of the aortic root and coronary arteries. The heart is normal in size.No pericardial effusion.    No endobronchial lesion. Mosaic attenuation of the lungs, likely air trapping. Parenchymal opacities are noted in both lower lobes. Bilateral parenchymal infiltrates are suspected.  Area of tree-in-bud opacities in the right lower lobe (2:65), likely impacted distal airways. No pneumothorax.    The visualized upper abdomen is unremarkable.    Evaluation of the osseous structures demonstrates degenerative changes of the spine.    IMPRESSION:     Bibasilar parenchymal infiltrates.    < end of copied text >

## 2020-01-22 NOTE — PROGRESS NOTE ADULT - SUBJECTIVE AND OBJECTIVE BOX
CC: f/u for pneumonia    Patient reports: she appears comfortable,cough is improving .No headache, still with periods of confusion    REVIEW OF SYSTEMS:  All other review of systems negative (Comprehensive ROS)    Antimicrobials Day #  :day 4  cefTRIAXone   IVPB 1000 milliGRAM(s) IV Intermittent every 24 hours  doxycycline hyclate Capsule 100 milliGRAM(s) Oral every 12 hours    Other Medications Reviewed    T(F): 98.5 (01-22-20 @ 10:21), Max: 98.8 (01-21-20 @ 11:02)  HR: 77 (01-22-20 @ 10:21)  BP: 130/74 (01-22-20 @ 10:21)  RR: 18 (01-22-20 @ 10:21)  SpO2: 96% (01-22-20 @ 10:21)  Wt(kg): --    PHYSICAL EXAM:  General: alert, no acute distress  Eyes:  anicteric, no conjunctival injection, no discharge  Oropharynx: no lesions or injection 	  Neck: supple, without adenopathy  Lungs: clear to auscultation  Heart: irregular rate and rhythm; no murmur, rubs or gallops  Abdomen: soft, nondistended, nontender, without mass or organomegaly  Skin: no lesions  Extremities: no clubbing, cyanosis, or edema  Neurologic: alert, oriented, moves all extremities    LAB RESULTS:                        11.9   9.80  )-----------( 279      ( 22 Jan 2020 08:25 )             37.0     01-22    134<L>  |  96  |  15  ----------------------------<  221<H>  3.7   |  21<L>  |  0.67    Ca    8.6      22 Jan 2020 07:10          MICROBIOLOGY:  RECENT CULTURES:  01-18 @ 20:14 .Blood Blood     No growth to date.      01-18 @ 20:00 .Urine Clean Catch (Midstream)     No growth          RADIOLOGY REVIEWED:

## 2020-01-22 NOTE — PROGRESS NOTE ADULT - ASSESSMENT
80 y/o F (Czech speaking) with PMH of HTN, T2DM, significant carotid stenosis, and atrial fibrillation referred by PMD Dr. Tatum for confusion which began acutely on 1/16. +congested, nonproductive cough. CT chest with bilateral PNA. Since admission, confusion is improved but still not baseline CT head negative, CT neck with 100% L ICA occlusion which is chronic per granddaughter.

## 2020-01-22 NOTE — PROGRESS NOTE ADULT - ASSESSMENT
79Y F w/ HTN, DMII, b/l carotid stenosis with known L ICA occlusion (2014), atrial fibrillation (not on AC for unclear reason only on aspirin) admitted for pneumonia. Neurology initially consulted 1/20 for confusion for 3-4 days. Per family at bedside, patient's mental status is slowly improving day by day. CTH negative, CTA H/N demonstrate chronic L ICA occlusion with distal reconstitution as well as mild stenosis of the supraclinoid portion of the R ICA.     Impression: toxic metabolic encephalopathy in the setting of pneumonia, with possible component of hospital induced delirium. No focal exam findings to suspect acute ischemia.     Recommend:  - continue supportive treatment for PNA per primary team  - would suggest anticoagulation for Afib but will defer to primary team/Cardiology  - can follow up outpatient with Neurology upon discharge at [facility], [address] [place] [phone]

## 2020-01-22 NOTE — PROGRESS NOTE ADULT - SUBJECTIVE AND OBJECTIVE BOX
Patient is a 79y old  Female who presents with a chief complaint of Confusion (22 Jan 2020 17:10)      SUBJECTIVE / OVERNIGHT EVENTS: Comfortable . Feels better. still with cough.  Review of Systems  chest pain no  palpitations no  sob improving   nausea no  headache no    MEDICATIONS  (STANDING):  albuterol/ipratropium for Nebulization 3 milliLiter(s) Nebulizer every 6 hours  atorvastatin 40 milliGRAM(s) Oral at bedtime  cefTRIAXone   IVPB 1000 milliGRAM(s) IV Intermittent every 24 hours  dextrose 5%. 1000 milliLiter(s) (50 mL/Hr) IV Continuous <Continuous>  dextrose 50% Injectable 12.5 Gram(s) IV Push once  dextrose 50% Injectable 25 Gram(s) IV Push once  dextrose 50% Injectable 25 Gram(s) IV Push once  doxycycline hyclate Capsule 100 milliGRAM(s) Oral every 12 hours  enalapril 20 milliGRAM(s) Oral two times a day  enoxaparin Injectable 40 milliGRAM(s) SubCutaneous daily  guaiFENesin ER 1200 milliGRAM(s) Oral every 12 hours  insulin lispro (HumaLOG) corrective regimen sliding scale   SubCutaneous three times a day before meals  insulin lispro (HumaLOG) corrective regimen sliding scale   SubCutaneous at bedtime  sotalol 80 milliGRAM(s) Oral two times a day    MEDICATIONS  (PRN):  acetaminophen   Tablet .. 650 milliGRAM(s) Oral every 6 hours PRN Mild Pain (1 - 3), Moderate Pain (4 - 6)  dextrose 40% Gel 15 Gram(s) Oral once PRN Blood Glucose LESS THAN 70 milliGRAM(s)/deciliter  glucagon  Injectable 1 milliGRAM(s) IntraMuscular once PRN Glucose LESS THAN 70 milligrams/deciliter      Vital Signs Last 24 Hrs  T(C): 36.9 (22 Jan 2020 17:03), Max: 36.9 (22 Jan 2020 10:21)  T(F): 98.5 (22 Jan 2020 17:03), Max: 98.5 (22 Jan 2020 10:21)  HR: 75 (22 Jan 2020 17:03) (70 - 80)  BP: 132/75 (22 Jan 2020 17:03) (126/80 - 168/80)  BP(mean): --  RR: 18 (22 Jan 2020 17:03) (18 - 18)  SpO2: 98% (22 Jan 2020 17:03) (95% - 98%)    PHYSICAL EXAM:  GENERAL: NAD, well-developed  HEAD:  Atraumatic, Normocephalic  EYES: EOMI, PERRLA, conjunctiva and sclera clear  NECK: Supple, No JVD  CHEST/LUNG: few rhonchi to auscultation bilaterally; No wheeze  HEART: Regular rate and rhythm; No murmurs, rubs, or gallops  ABDOMEN: Soft, Nontender, Nondistended; Bowel sounds present  EXTREMITIES:  2+ Peripheral Pulses, No clubbing, cyanosis, or edema  PSYCH: AAOx3  NEUROLOGY: non-focal  SKIN: No rashes or lesions    LABS:                        11.9   9.80  )-----------( 279      ( 22 Jan 2020 08:25 )             37.0     01-22    134<L>  |  96  |  15  ----------------------------<  221<H>  3.7   |  21<L>  |  0.67    Ca    8.6      22 Jan 2020 07:10                  RADIOLOGY & ADDITIONAL TESTS:    Imaging Personally Reviewed:    Consultant(s) Notes Reviewed:      Care Discussed with Consultants/Other Providers:

## 2020-01-22 NOTE — PROGRESS NOTE ADULT - ASSESSMENT
80 yo femal with HTN,DM, and A.Fib admitted with a cough x 2 weeks and 3 days of confusion,  Patient and son not aware of any fever.  Viral URI's have been circulating in household.  She denies any headache and neuro feels confusion is toxic metabolic.  CT scan sugests pneumonia.  RVP is negative, legionella urine antigen is negative  PC elevated at .42  Marginal improvement in confusion.  WBC decreased to 9800 ,000 from 13,000  Mycoplasma serology is pending  Suggest:  1.continue CTX and doxy, day 4 of 5-7 day course  2.Can switch to oral route-ceftin 500 BID and po doxy 100 BID at any point  4.? Additional neuro w/u if improvement does not continue, will defer to medicine and neurology

## 2020-01-22 NOTE — PROGRESS NOTE ADULT - ASSESSMENT
79F (Welsh speaking) with PMHx of HTN, T2DM, significant carotid stenosis, and atrial fibrillation referred by PMD Dr. Tatum for confusion and noted fever for three days. Patient's family endorsing sick contacts with family suffering from viral URI. Patient slightly febrile here with minor hypoxic respiratory failure which corrects with 2 L NC. Labs are significant for neutrophilic pre-dominant leukocytosis, slight transaminitis and slight pyuria. CXR showing possible left lower lobe opacity with CT chest being read as bibasilar atelectasis.     There is suspicion that patient has encephalopathy secondary to infectious etiology, possibly the lung. Given her slight acute hypoxic respiratory failure will treat empirically for pneumonia. Patient with no dysuria and only mild pyuria. She has a history of atrial fibrillation and bilateral carotid stenosis, would need to consider CT head and primary neurological issue if she does not improve on antibiotics.     Encephalopathy acute improving .   - Treat as possible infectious etiology with antibiotics  - Neurology follow noted    Pneumonia.   - Empiric treatment with Ceftriaxone and Doxy  - Follow up urine legionella, Mycoplasma serology  - Follow up blood cultures  - ID follow noted  - Pulmonary follow     Atrial fibrillation.   -Continue with Sotalol (QTc 462)  -Patient not on anti-coagulation now. Will defer to primary care physician.     Diabetes mellitus, type II.   - hold Glipizide while inpatient  - FS TID AC and Insulin sliding scale.     HTN (hypertension).  - Increase Enalapril 20 mg BID.     Carotid stenosis.   - Continue with Lipitor 40  - Hold Ezetimibe for now.    Pyuria.   - No dysuria, antibiotic for pneumonia should cover urine organisms  - Follow up urine culture.     Transaminitis.  - Continue to trend and avoid hepatotoxic medication    d/w patient and relative at bedside    Noe Lacey MD pager [phone]

## 2020-01-22 NOTE — PROGRESS NOTE ADULT - PROBLEM SELECTOR PLAN 3
Still confused, oriented x2  -Defer to neruo/primary team whether further imaging to r/o CVA is needed  -No obvious CVA/hemorrhage/mass on CT head

## 2020-01-22 NOTE — PROGRESS NOTE ADULT - SUBJECTIVE AND OBJECTIVE BOX
Subjective: No overnight events. Translation provided by family at bedside. States patient mental status is improving.         MEDICATIONS  (STANDING):  albuterol/ipratropium for Nebulization 3 milliLiter(s) Nebulizer every 6 hours  atorvastatin 40 milliGRAM(s) Oral at bedtime  cefTRIAXone   IVPB 1000 milliGRAM(s) IV Intermittent every 24 hours  dextrose 5%. 1000 milliLiter(s) (50 mL/Hr) IV Continuous <Continuous>  dextrose 50% Injectable 12.5 Gram(s) IV Push once  dextrose 50% Injectable 25 Gram(s) IV Push once  dextrose 50% Injectable 25 Gram(s) IV Push once  doxycycline hyclate Capsule 100 milliGRAM(s) Oral every 12 hours  enalapril 20 milliGRAM(s) Oral two times a day  enoxaparin Injectable 40 milliGRAM(s) SubCutaneous daily  guaiFENesin ER 1200 milliGRAM(s) Oral every 12 hours  insulin lispro (HumaLOG) corrective regimen sliding scale   SubCutaneous three times a day before meals  insulin lispro (HumaLOG) corrective regimen sliding scale   SubCutaneous at bedtime  sotalol 80 milliGRAM(s) Oral two times a day    MEDICATIONS  (PRN):  acetaminophen   Tablet .. 650 milliGRAM(s) Oral every 6 hours PRN Mild Pain (1 - 3), Moderate Pain (4 - 6)  dextrose 40% Gel 15 Gram(s) Oral once PRN Blood Glucose LESS THAN 70 milliGRAM(s)/deciliter  glucagon  Injectable 1 milliGRAM(s) IntraMuscular once PRN Glucose LESS THAN 70 milligrams/deciliter      Allergies    No Known Allergies    Intolerances        Objective:   Vital Signs Last 24 Hrs  T(C): 36.9 (22 Jan 2020 17:03), Max: 37.1 (21 Jan 2020 18:56)  T(F): 98.5 (22 Jan 2020 17:03), Max: 98.7 (21 Jan 2020 18:56)  HR: 75 (22 Jan 2020 17:03) (70 - 80)  BP: 132/75 (22 Jan 2020 17:03) (126/80 - 168/80)  BP(mean): --  RR: 18 (22 Jan 2020 17:03) (18 - 18)  SpO2: 98% (22 Jan 2020 17:03) (95% - 98%)    General Exam:   General appearance: No acute distress                 Cardiovascular: Pedal dorsalis pulses intact bilaterally    Neurological Exam:  Mental Status: A & O x 3  No dysarthria, aphasia or neglect.      Cranial Nerves:   CN I - not tested.    PERRL, EOMI, VFF, no nystagmus or diplopia.    No APD.  Fundi not visualized bilaterally.    CN V1-3 intact to light touch and pinprick.    No facial asymmetry.      Motor:   Tone: normal.                  Strength: intact throughout  Pronator drift: none                 Dysmeria: None to finger-nose-finger or heel-shin-heel  No truncal ataxia.    Tremor: No resting, postural or action tremor.  No myoclonus.    Sensation: intact to light touch, pinprick, vibration and proprioception    Deep Tendon Reflexes: 1+ bilateral biceps, triceps, brachioradialis, knee and ankle  Toes flexor bilaterally    Gait: normal and stable.        01-22    134<L>  |  96  |  15  ----------------------------<  221<H>  3.7   |  21<L>  |  0.67    Ca    8.6      22 Jan 2020 07:10          Radiology Subjective: No overnight events. Translation provided by family at bedside. States patient mental status is improving.       MEDICATIONS  (STANDING):  albuterol/ipratropium for Nebulization 3 milliLiter(s) Nebulizer every 6 hours  atorvastatin 40 milliGRAM(s) Oral at bedtime  cefTRIAXone   IVPB 1000 milliGRAM(s) IV Intermittent every 24 hours  dextrose 5%. 1000 milliLiter(s) (50 mL/Hr) IV Continuous <Continuous>  dextrose 50% Injectable 12.5 Gram(s) IV Push once  dextrose 50% Injectable 25 Gram(s) IV Push once  dextrose 50% Injectable 25 Gram(s) IV Push once  doxycycline hyclate Capsule 100 milliGRAM(s) Oral every 12 hours  enalapril 20 milliGRAM(s) Oral two times a day  enoxaparin Injectable 40 milliGRAM(s) SubCutaneous daily  guaiFENesin ER 1200 milliGRAM(s) Oral every 12 hours  insulin lispro (HumaLOG) corrective regimen sliding scale   SubCutaneous three times a day before meals  insulin lispro (HumaLOG) corrective regimen sliding scale   SubCutaneous at bedtime  sotalol 80 milliGRAM(s) Oral two times a day    MEDICATIONS  (PRN):  acetaminophen   Tablet .. 650 milliGRAM(s) Oral every 6 hours PRN Mild Pain (1 - 3), Moderate Pain (4 - 6)  dextrose 40% Gel 15 Gram(s) Oral once PRN Blood Glucose LESS THAN 70 milliGRAM(s)/deciliter  glucagon  Injectable 1 milliGRAM(s) IntraMuscular once PRN Glucose LESS THAN 70 milligrams/deciliter      Allergies    No Known Allergies    Intolerances        Objective:   Vital Signs Last 24 Hrs  T(C): 36.9 (22 Jan 2020 17:03), Max: 37.1 (21 Jan 2020 18:56)  T(F): 98.5 (22 Jan 2020 17:03), Max: 98.7 (21 Jan 2020 18:56)  HR: 75 (22 Jan 2020 17:03) (70 - 80)  BP: 132/75 (22 Jan 2020 17:03) (126/80 - 168/80)  BP(mean): --  RR: 18 (22 Jan 2020 17:03) (18 - 18)  SpO2: 98% (22 Jan 2020 17:03) (95% - 98%)    Neurological (>12):  MS: Awake, alert, oriented to self, stated she was recently in the hospital but is now home. Cannot tell me the date.  Normal affect. Follows all commands with daughter providing translation.    Language: Speech is clear, fluent with good repetition & comprehension (in Estonian)    CNs: PERRLA (R = 4mm, L = 4mm). VFF. EOMI no nystagmus, no diplopia. V1-3 intact to LT/pinprick, well developed masseter muscles b/l. mild left nasolabial flattening though daughter reports this is her baseline (she uses dentures), full eye closure strength b/l. Hearing grossly normal (rubbing fingers) b/l. Symmetric palate elevation in midline. Gag reflex deferred. Head turning & shoulder shrug intact b/l. Tongue midline, normal movements, no atrophy.    Motor: Normal muscle bulk & tone. No noticeable tremor. No pronator nor downward drift. Patient otherwise grossly strong symmetrically	     Sensation: Intact to LT b/l throughout.     Cortical: Extinction on DSS (neglect): none    Coordination: intact rapid-alt movements. No dysmetria to FTN    Gait: normal     01-22    134<L>  |  96  |  15  ----------------------------<  221<H>  3.7   |  21<L>  |  0.67    Ca    8.6      22 Jan 2020 07:10          Radiology  < from: CT Angio Neck w/ IV Cont (01.19.20 @ 17:40) >      IMPRESSION:    Head CT: No acute intracranial hemorrhage, mass effect, or shift of the midline structures.    CTA neck: Atherosclerosis involving the left carotid bifurcation with complete occlusion of the leftinternal carotid artery extending to the skull base. This is of unknown timeframe.    Otherwise no large vessel occlusion or major stenosis.    CTA head: Continuation of occlusion of the left intracranial internal carotid artery to the clinoid/supraclinoid junction with constitution through the [place] at this level.    Mild focal stenosis involving the right supraclinoid intracranial internal carotid artery.    Otherwise no large vessel occlusion or major stenosis.    If clinical symptoms are new and persistent, consider further evaluation with brain MRI examination, if there are no MRI contraindications.    < end of copied text >

## 2020-01-23 ENCOUNTER — TRANSCRIPTION ENCOUNTER (OUTPATIENT)
Age: 80
End: 2020-01-23

## 2020-01-23 LAB
ANION GAP SERPL CALC-SCNC: 14 MMOL/L — SIGNIFICANT CHANGE UP (ref 5–17)
BUN SERPL-MCNC: 14 MG/DL — SIGNIFICANT CHANGE UP (ref 7–23)
CALCIUM SERPL-MCNC: 9 MG/DL — SIGNIFICANT CHANGE UP (ref 8.4–10.5)
CHLORIDE SERPL-SCNC: 98 MMOL/L — SIGNIFICANT CHANGE UP (ref 96–108)
CO2 SERPL-SCNC: 23 MMOL/L — SIGNIFICANT CHANGE UP (ref 22–31)
CREAT SERPL-MCNC: 0.71 MG/DL — SIGNIFICANT CHANGE UP (ref 0.5–1.3)
CULTURE RESULTS: SIGNIFICANT CHANGE UP
CULTURE RESULTS: SIGNIFICANT CHANGE UP
GLUCOSE BLDC GLUCOMTR-MCNC: 193 MG/DL — HIGH (ref 70–99)
GLUCOSE BLDC GLUCOMTR-MCNC: 206 MG/DL — HIGH (ref 70–99)
GLUCOSE BLDC GLUCOMTR-MCNC: 220 MG/DL — HIGH (ref 70–99)
GLUCOSE BLDC GLUCOMTR-MCNC: 260 MG/DL — HIGH (ref 70–99)
GLUCOSE SERPL-MCNC: 205 MG/DL — HIGH (ref 70–99)
POTASSIUM SERPL-MCNC: 3.8 MMOL/L — SIGNIFICANT CHANGE UP (ref 3.5–5.3)
POTASSIUM SERPL-SCNC: 3.8 MMOL/L — SIGNIFICANT CHANGE UP (ref 3.5–5.3)
SODIUM SERPL-SCNC: 135 MMOL/L — SIGNIFICANT CHANGE UP (ref 135–145)
SPECIMEN SOURCE: SIGNIFICANT CHANGE UP
SPECIMEN SOURCE: SIGNIFICANT CHANGE UP

## 2020-01-23 RX ORDER — POLYETHYLENE GLYCOL 3350 17 G/17G
17 POWDER, FOR SOLUTION ORAL DAILY
Refills: 0 | Status: DISCONTINUED | OUTPATIENT
Start: 2020-01-23 | End: 2020-01-24

## 2020-01-23 RX ADMIN — ENOXAPARIN SODIUM 40 MILLIGRAM(S): 100 INJECTION SUBCUTANEOUS at 11:14

## 2020-01-23 RX ADMIN — Medication 3 MILLILITER(S): at 11:58

## 2020-01-23 RX ADMIN — POLYETHYLENE GLYCOL 3350 17 GRAM(S): 17 POWDER, FOR SOLUTION ORAL at 18:16

## 2020-01-23 RX ADMIN — Medication 3 MILLILITER(S): at 05:29

## 2020-01-23 RX ADMIN — Medication 20 MILLIGRAM(S): at 05:29

## 2020-01-23 RX ADMIN — Medication 100 MILLIGRAM(S): at 17:03

## 2020-01-23 RX ADMIN — Medication 1: at 08:54

## 2020-01-23 RX ADMIN — Medication 2: at 18:12

## 2020-01-23 RX ADMIN — Medication 80 MILLIGRAM(S): at 05:28

## 2020-01-23 RX ADMIN — Medication 80 MILLIGRAM(S): at 17:03

## 2020-01-23 RX ADMIN — Medication 100 MILLIGRAM(S): at 05:28

## 2020-01-23 RX ADMIN — Medication 3: at 12:38

## 2020-01-23 RX ADMIN — Medication 1200 MILLIGRAM(S): at 17:03

## 2020-01-23 RX ADMIN — Medication 650 MILLIGRAM(S): at 00:05

## 2020-01-23 RX ADMIN — Medication 650 MILLIGRAM(S): at 21:00

## 2020-01-23 RX ADMIN — Medication 20 MILLIGRAM(S): at 17:03

## 2020-01-23 RX ADMIN — ATORVASTATIN CALCIUM 40 MILLIGRAM(S): 80 TABLET, FILM COATED ORAL at 21:39

## 2020-01-23 RX ADMIN — CEFTRIAXONE 100 MILLIGRAM(S): 500 INJECTION, POWDER, FOR SOLUTION INTRAMUSCULAR; INTRAVENOUS at 17:06

## 2020-01-23 RX ADMIN — Medication 1200 MILLIGRAM(S): at 05:29

## 2020-01-23 RX ADMIN — Medication 650 MILLIGRAM(S): at 21:39

## 2020-01-23 RX ADMIN — Medication 3 MILLILITER(S): at 17:06

## 2020-01-23 NOTE — DISCHARGE NOTE PROVIDER - HOSPITAL COURSE
79 year old F (Khmer speaking) with previous medical history of type 2 diabetes, hypertension, significant carotid stenosis, and at    rial fibrillation referred by PMD Dr. Tatum for confusion that began acutely on 1/16/20. Patient was congested with nonproductive cough, compounded by CT chest with bilateral consolidations indicative of pneumonia. Since admission patient mental status improving but not quote at baseline. 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        Plan        Pneumonia      - Patient is RVP and urine legionella negative, pneumonia most likely bacterial.      - Mycoplasma lab pending ________________________________     - Patient treated with 5 days of ceftriaxone and doxycycline      - Duoneb and mucinex as needed, continue to encourage secretion clearance         Confusion     - Patient CTH and CVA negative of acute changes, mentation improving with treatment        Hypoxia     - Patient weaned off of supplemental oxygen as treatment continued      - Incentive spirometry encouraged 79 year old F (Danish speaking) with previous medical history of type 2 diabetes, hypertension, significant carotid stenosis, and at    rial fibrillation referred by PMD Dr. Tatum for confusion that began acutely on 1/16/20. Patient was congested with nonproductive cough, compounded by CT chest with bilateral consolidations indicative of pneumonia. Since admission patient mental status improving but not quote at baseline. 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 Cleared by Dr. Lacey for discharge home with Ceftin 500mg BID x 3 days. Pt will follow up with primary care doctor next week.        Plan        Pneumonia      - Patient is RVP and urine legionella negative, pneumonia most likely bacterial.      - Mycoplasma lab pending ________________________________     - Patient treated with 5 days of ceftriaxone and doxycycline and d/c on Ceftin 500mg bid x3 days per attending.     - Duoneb and mucinex as needed, continue to encourage secretion clearance         Confusion     - Patient CTH and CVA negative of acute changes, mentation improving with treatment        Hypoxia     - Patient weaned off of supplemental oxygen as treatment continued      - Incentive spirometry encouraged

## 2020-01-23 NOTE — DISCHARGE NOTE PROVIDER - CARE PROVIDERS DIRECT ADDRESSES
,[email or web address],DirectAddress_Unknown

## 2020-01-23 NOTE — DISCHARGE NOTE PROVIDER - PROVIDER TOKENS
PROVIDER:[TOKEN:[[id number]],FOLLOWUP:[1 week]],PROVIDER:[TOKEN:[[id number]],FOLLOWUP:[1 week]]

## 2020-01-23 NOTE — PROGRESS NOTE ADULT - ASSESSMENT
79F (Maldivian speaking) with PMHx of HTN, T2DM, significant carotid stenosis, and atrial fibrillation referred by PMD Dr. Tatum for confusion and noted fever for three days. Patient's family endorsing sick contacts with family suffering from viral URI. Patient slightly febrile here with minor hypoxic respiratory failure which corrects with 2 L NC. Labs are significant for neutrophilic pre-dominant leukocytosis, slight transaminitis and slight pyuria. CXR showing possible left lower lobe opacity with CT chest being read as bibasilar atelectasis.     There is suspicion that patient has encephalopathy secondary to infectious etiology, possibly the lung. Given her slight acute hypoxic respiratory failure will treat empirically for pneumonia. Patient with no dysuria and only mild pyuria. She has a history of atrial fibrillation and bilateral carotid stenosis, would need to consider CT head and primary neurological issue if she does not improve on antibiotics.     Encephalopathy acute improving .   - Treat as possible infectious etiology with antibiotics  - Neurology follow noted    Pneumonia.   - Empiric treatment with Ceftriaxone and Doxy  - Follow up urine legionella, Mycoplasma serology  - Follow up blood cultures  - ID follow noted  - Pulmonary follow     Atrial fibrillation.   -Continue with Sotalol (QTc 462)  -Patient not on anti-coagulation now. Will defer to primary care physician.     Diabetes mellitus, type II.   - hold Glipizide while inpatient  - FS TID AC and Insulin sliding scale.     HTN (hypertension).  - Increase Enalapril 20 mg BID.     Carotid stenosis.   - Continue with Lipitor 40  - Hold Ezetimibe for now.    Pyuria.   - No dysuria, antibiotic for pneumonia should cover urine organisms  - Follow up urine culture.     Transaminitis.  - Continue to trend and avoid hepatotoxic medication    d/w patient and relative at bedside    DCP in am if stable.    Noe Lacey MD pager [phone] 79F (Libyan speaking) with PMHx of HTN, T2DM, significant carotid stenosis, and atrial fibrillation referred by PMD Dr. Tatum for confusion and noted fever for three days. Patient's family endorsing sick contacts with family suffering from viral URI. Patient slightly febrile here with minor hypoxic respiratory failure which corrects with 2 L NC. Labs are significant for neutrophilic pre-dominant leukocytosis, slight transaminitis and slight pyuria. CXR showing possible left lower lobe opacity with CT chest being read as bibasilar atelectasis.     There is suspicion that patient has encephalopathy secondary to infectious etiology, possibly the lung. Given her slight acute hypoxic respiratory failure will treat empirically for pneumonia. Patient with no dysuria and only mild pyuria. She has a history of atrial fibrillation and bilateral carotid stenosis, would need to consider CT head and primary neurological issue if she does not improve on antibiotics.     Encephalopathy acute improving .   - Treat as possible infectious etiology with antibiotics  - Neurology follow noted    Pneumonia.   - Empiric treatment with Ceftriaxone and Doxy  - Follow up urine legionella, Mycoplasma serology  - Follow up blood cultures  - ID follow noted  - Pulmonary follow     Atrial fibrillation.   -Continue with Sotalol (QTc 462)  -Patient not on anti-coagulation now. Will defer to primary care physician.     Diabetes mellitus, type II.   - hold Glipizide while inpatient  - FS TID AC and Insulin sliding scale.     HTN (hypertension).  - Increase Enalapril 20 mg BID.     Carotid stenosis.   - Continue with Lipitor 40  - Hold Ezetimibe for now.    Pyuria.   - No dysuria, antibiotic for pneumonia should cover urine organisms  - Follow up urine culture.     Transaminitis.  - Continue to trend and avoid hepatotoxic medication    Constipation  - bowel regimen    d/w patient and relative at bedside    DCP in am if stable.    Noe Lacey MD pager [phone]

## 2020-01-23 NOTE — DISCHARGE NOTE PROVIDER - CARE PROVIDER_API CALL
Artemio Chahal)  Neurology  [address] [place] [address]  Phone: [phone]  Fax: [phone]  Follow Up Time: 1 week    Alessandro Tatum)  Geriatric Medicine; Internal Medicine  [id number] [place], [place] [address]  Phone: [phone]  Fax: [phone]  Follow Up Time: 1 week

## 2020-01-23 NOTE — DISCHARGE NOTE PROVIDER - NSDCCPCAREPLAN_GEN_ALL_CORE_FT
PRINCIPAL DISCHARGE DIAGNOSIS  Diagnosis: Pneumonia  Assessment and Plan of Treatment: Treated with five day course of ceftriaxone and doxycycline, symptoms improved. If patient mental status becomes altered, patient has difficulty breathing or symptoms recur please return to hospital. Follow up right pleural effusion with PCP within 1 week and repeat imaging in 2 weeks.      SECONDARY DISCHARGE DIAGNOSES  Diagnosis: Confusion  Assessment and Plan of Treatment: Resolved. Follow up with outpatient neurology within 1 week.    Diagnosis: Diabetes mellitus, type II  Assessment and Plan of Treatment: HgbA1c 7.8. Continue with home meds as instructed, follow up with PCP and endocrinologist within 1 week.    Diagnosis: Atrial fibrillation  Assessment and Plan of Treatment: Continue sotolol. Discuss anticoagulation with primary care within 1 week.    Diagnosis: Transaminitis  Assessment and Plan of Treatment: Likely secondary to infection. Resolved, follow up with primary care doctor within 1 week. PRINCIPAL DISCHARGE DIAGNOSIS  Diagnosis: Pneumonia  Assessment and Plan of Treatment: Treated with five day course of ceftriaxone and doxycycline, symptoms improved, continue cefitin 500mg twice daily for 3 days.   If patient mental status becomes altered, patient has difficulty breathing or symptoms recur please return to hospital. Follow up right pleural effusion with PCP within 1 week and repeat imaging in 2 weeks.      SECONDARY DISCHARGE DIAGNOSES  Diagnosis: Transaminitis  Assessment and Plan of Treatment: Likely secondary to infection. Resolved, follow up with primary care doctor within 1 week.    Diagnosis: Atrial fibrillation  Assessment and Plan of Treatment: Continue sotolol. Discuss anticoagulation with primary care within 1 week.    Diagnosis: Diabetes mellitus, type II  Assessment and Plan of Treatment: HgbA1c 7.8. Continue with home meds as instructed, follow up with PCP and endocrinologist within 1 week.    Diagnosis: Confusion  Assessment and Plan of Treatment: Resolved. Follow up with outpatient neurology within 1 week.

## 2020-01-23 NOTE — PROGRESS NOTE ADULT - SUBJECTIVE AND OBJECTIVE BOX
Patient is a 79y old  Female who presents with a chief complaint of Confusion (23 Jan 2020 15:41)      SUBJECTIVE / OVERNIGHT EVENTS: feels better.   Review of Systems  chest pain no  palpitations no  sob no  nausea no  headache no    MEDICATIONS  (STANDING):  albuterol/ipratropium for Nebulization 3 milliLiter(s) Nebulizer every 6 hours  atorvastatin 40 milliGRAM(s) Oral at bedtime  dextrose 5%. 1000 milliLiter(s) (50 mL/Hr) IV Continuous <Continuous>  dextrose 50% Injectable 12.5 Gram(s) IV Push once  dextrose 50% Injectable 25 Gram(s) IV Push once  dextrose 50% Injectable 25 Gram(s) IV Push once  enalapril 20 milliGRAM(s) Oral two times a day  enoxaparin Injectable 40 milliGRAM(s) SubCutaneous daily  guaiFENesin ER 1200 milliGRAM(s) Oral every 12 hours  insulin lispro (HumaLOG) corrective regimen sliding scale   SubCutaneous three times a day before meals  insulin lispro (HumaLOG) corrective regimen sliding scale   SubCutaneous at bedtime  sotalol 80 milliGRAM(s) Oral two times a day    MEDICATIONS  (PRN):  acetaminophen   Tablet .. 650 milliGRAM(s) Oral every 6 hours PRN Mild Pain (1 - 3), Moderate Pain (4 - 6)  dextrose 40% Gel 15 Gram(s) Oral once PRN Blood Glucose LESS THAN 70 milliGRAM(s)/deciliter  glucagon  Injectable 1 milliGRAM(s) IntraMuscular once PRN Glucose LESS THAN 70 milligrams/deciliter      Vital Signs Last 24 Hrs  T(C): 36.9 (23 Jan 2020 16:36), Max: 37.1 (23 Jan 2020 10:25)  T(F): 98.4 (23 Jan 2020 16:36), Max: 98.7 (23 Jan 2020 10:25)  HR: 72 (23 Jan 2020 16:36) (66 - 78)  BP: 156/82 (23 Jan 2020 16:36) (111/60 - 160/85)  BP(mean): --  RR: 18 (23 Jan 2020 16:36) (18 - 18)  SpO2: 93% (23 Jan 2020 16:36) (93% - 97%)    PHYSICAL EXAM:  GENERAL: NAD   HEAD:  Atraumatic, Normocephalic  EYES: EOMI, PERRLA, conjunctiva and sclera clear  NECK: Supple, No JVD  CHEST/LUNG: few rhonchi to auscultation bilaterally; No wheeze  HEART: Regular rate and rhythm; No murmurs, rubs, or gallops  ABDOMEN: Soft, Nontender, Nondistended; Bowel sounds present  EXTREMITIES:  2+ Peripheral Pulses, No clubbing, cyanosis, or edema  PSYCH: AAOx3  NEUROLOGY: non-focal  SKIN: No rashes or lesions    LABS:                        11.9   9.80  )-----------( 279      ( 22 Jan 2020 08:25 )             37.0     01-23    135  |  98  |  14  ----------------------------<  205<H>  3.8   |  23  |  0.71    Ca    9.0      23 Jan 2020 07:08                  RADIOLOGY & ADDITIONAL TESTS:    Imaging Personally Reviewed:    Consultant(s) Notes Reviewed:      Care Discussed with Consultants/Other Providers: Patient is a 79y old  Female who presents with a chief complaint of Confusion (23 Jan 2020 15:41)      SUBJECTIVE / OVERNIGHT EVENTS: feels better. c/o constipation  Review of Systems  chest pain no  palpitations no  sob no  nausea no  headache no    MEDICATIONS  (STANDING):  albuterol/ipratropium for Nebulization 3 milliLiter(s) Nebulizer every 6 hours  atorvastatin 40 milliGRAM(s) Oral at bedtime  dextrose 5%. 1000 milliLiter(s) (50 mL/Hr) IV Continuous <Continuous>  dextrose 50% Injectable 12.5 Gram(s) IV Push once  dextrose 50% Injectable 25 Gram(s) IV Push once  dextrose 50% Injectable 25 Gram(s) IV Push once  enalapril 20 milliGRAM(s) Oral two times a day  enoxaparin Injectable 40 milliGRAM(s) SubCutaneous daily  guaiFENesin ER 1200 milliGRAM(s) Oral every 12 hours  insulin lispro (HumaLOG) corrective regimen sliding scale   SubCutaneous three times a day before meals  insulin lispro (HumaLOG) corrective regimen sliding scale   SubCutaneous at bedtime  sotalol 80 milliGRAM(s) Oral two times a day    MEDICATIONS  (PRN):  acetaminophen   Tablet .. 650 milliGRAM(s) Oral every 6 hours PRN Mild Pain (1 - 3), Moderate Pain (4 - 6)  dextrose 40% Gel 15 Gram(s) Oral once PRN Blood Glucose LESS THAN 70 milliGRAM(s)/deciliter  glucagon  Injectable 1 milliGRAM(s) IntraMuscular once PRN Glucose LESS THAN 70 milligrams/deciliter      Vital Signs Last 24 Hrs  T(C): 36.9 (23 Jan 2020 16:36), Max: 37.1 (23 Jan 2020 10:25)  T(F): 98.4 (23 Jan 2020 16:36), Max: 98.7 (23 Jan 2020 10:25)  HR: 72 (23 Jan 2020 16:36) (66 - 78)  BP: 156/82 (23 Jan 2020 16:36) (111/60 - 160/85)  BP(mean): --  RR: 18 (23 Jan 2020 16:36) (18 - 18)  SpO2: 93% (23 Jan 2020 16:36) (93% - 97%)    PHYSICAL EXAM:  GENERAL: NAD   HEAD:  Atraumatic, Normocephalic  EYES: EOMI, PERRLA, conjunctiva and sclera clear  NECK: Supple, No JVD  CHEST/LUNG: few rhonchi to auscultation bilaterally; No wheeze  HEART: Regular rate and rhythm; No murmurs, rubs, or gallops  ABDOMEN: Soft, Nontender, Nondistended; Bowel sounds present  EXTREMITIES:  2+ Peripheral Pulses, No clubbing, cyanosis, or edema  PSYCH: AAOx3  NEUROLOGY: non-focal  SKIN: No rashes or lesions    LABS:                        11.9   9.80  )-----------( 279      ( 22 Jan 2020 08:25 )             37.0     01-23    135  |  98  |  14  ----------------------------<  205<H>  3.8   |  23  |  0.71    Ca    9.0      23 Jan 2020 07:08                  RADIOLOGY & ADDITIONAL TESTS:    Imaging Personally Reviewed:    Consultant(s) Notes Reviewed:      Care Discussed with Consultants/Other Providers:

## 2020-01-23 NOTE — PROGRESS NOTE ADULT - SUBJECTIVE AND OBJECTIVE BOX
CC: f/u for pneumonia    Patient reports: she is alert , cough improved    REVIEW OF SYSTEMS:  All other review of systems negative (Comprehensive ROS)    Antimicrobials Day #  :day 5/5  cefTRIAXone   IVPB 1000 milliGRAM(s) IV Intermittent every 24 hours  doxycycline hyclate Capsule 100 milliGRAM(s) Oral every 12 hours    Other Medications Reviewed    T(F): 98.4 (01-23-20 @ 13:04), Max: 98.7 (01-23-20 @ 10:25)  HR: 76 (01-23-20 @ 13:04)  BP: 143/78 (01-23-20 @ 13:04)  RR: 18 (01-23-20 @ 13:04)  SpO2: 97% (01-23-20 @ 13:04)  Wt(kg): --    PHYSICAL EXAM:  General: alert, no acute distress  Eyes:  anicteric, no conjunctival injection, no discharge  Oropharynx: no lesions or injection 	  Neck: supple, without adenopathy  Lungs: clear to auscultation  Heart: regular rate and rhythm; no murmur, rubs or gallops  Abdomen: soft, nondistended, nontender, without mass or organomegaly  Skin: no lesions  Extremities: no clubbing, cyanosis, or edema  Neurologic: alert, oriented, moves all extremities    LAB RESULTS:                        11.9   9.80  )-----------( 279      ( 22 Jan 2020 08:25 )             37.0     01-23    135  |  98  |  14  ----------------------------<  205<H>  3.8   |  23  |  0.71    Ca    9.0      23 Jan 2020 07:08          MICROBIOLOGY:  RECENT CULTURES:  01-18 @ 20:14 .Blood Blood     No growth to date.      01-18 @ 20:00 .Urine Clean Catch (Midstream)     No growth          RADIOLOGY REVIEWED:

## 2020-01-23 NOTE — PROGRESS NOTE ADULT - PROBLEM SELECTOR PLAN 1
Improved  -Keep sp02>90% on supplemental oxygen as needed  -Encouraged secretion clearance.  -Incentive spirometer  -Monitor off oxygen  -Possible d/c planning tomorrow

## 2020-01-23 NOTE — PROGRESS NOTE ADULT - SUBJECTIVE AND OBJECTIVE BOX
Follow-up Pulm Progress Note    No new respiratory events overnight.  Denies SOB/CP.   Confusion improved but still not at baseline  92% on RA    Medications:  MEDICATIONS  (STANDING):  albuterol/ipratropium for Nebulization 3 milliLiter(s) Nebulizer every 6 hours  atorvastatin 40 milliGRAM(s) Oral at bedtime  cefTRIAXone   IVPB 1000 milliGRAM(s) IV Intermittent every 24 hours  dextrose 5%. 1000 milliLiter(s) (50 mL/Hr) IV Continuous <Continuous>  dextrose 50% Injectable 12.5 Gram(s) IV Push once  dextrose 50% Injectable 25 Gram(s) IV Push once  dextrose 50% Injectable 25 Gram(s) IV Push once  doxycycline hyclate Capsule 100 milliGRAM(s) Oral every 12 hours  enalapril 20 milliGRAM(s) Oral two times a day  enoxaparin Injectable 40 milliGRAM(s) SubCutaneous daily  guaiFENesin ER 1200 milliGRAM(s) Oral every 12 hours  insulin lispro (HumaLOG) corrective regimen sliding scale   SubCutaneous three times a day before meals  insulin lispro (HumaLOG) corrective regimen sliding scale   SubCutaneous at bedtime  sotalol 80 milliGRAM(s) Oral two times a day    MEDICATIONS  (PRN):  acetaminophen   Tablet .. 650 milliGRAM(s) Oral every 6 hours PRN Mild Pain (1 - 3), Moderate Pain (4 - 6)  dextrose 40% Gel 15 Gram(s) Oral once PRN Blood Glucose LESS THAN 70 milliGRAM(s)/deciliter  glucagon  Injectable 1 milliGRAM(s) IntraMuscular once PRN Glucose LESS THAN 70 milligrams/deciliter          Vital Signs Last 24 Hrs  T(C): 36.9 (23 Jan 2020 13:04), Max: 37.1 (23 Jan 2020 10:25)  T(F): 98.4 (23 Jan 2020 13:04), Max: 98.7 (23 Jan 2020 10:25)  HR: 76 (23 Jan 2020 13:04) (66 - 78)  BP: 143/78 (23 Jan 2020 13:04) (111/60 - 160/85)  BP(mean): --  RR: 18 (23 Jan 2020 13:04) (18 - 18)  SpO2: 97% (23 Jan 2020 13:04) (96% - 98%) on RA          01-22 @ 07:01 - 01-23 @ 07:00  --------------------------------------------------------  IN: 1160 mL / OUT: 0 mL / NET: 1160 mL          LABS:                        11.9   9.80  )-----------( 279      ( 22 Jan 2020 08:25 )             37.0     01-23    135  |  98  |  14  ----------------------------<  205<H>  3.8   |  23  |  0.71    Ca    9.0      23 Jan 2020 07:08            CAPILLARY BLOOD GLUCOSE      POCT Blood Glucose.: 260 mg/dL (23 Jan 2020 12:35)                        CULTURES: (if applicable)  Culture Results:   No growth to date. (01-18 @ 20:14)  Culture Results:   No growth to date. (01-18 @ 20:14)  Culture Results:   No growth (01-18 @ 20:00)    Most recent blood culture -- 01-18 @ 20:14   -- -- .Blood Blood 01-18 @ 20:14  Most recent blood culture -- 01-18 @ 20:00   -- -- .Urine Clean Catch (Midstream) 01-18 @ 20:00        Physical Examination:  PULM: Clear to auscultation bilaterally, no significant sputum production  CVS: S1, S2 heard    RADIOLOGY REVIEWED  CT chest: < from: CT Chest No Cont (01.18.20 @ 19:53) >  FINDINGS:    No axillary adenopathy. Small mediastinal lymph nodes. The thyroid is enlarged.    Calcifications of the aortic root and coronary arteries. The heart is normal in size.No pericardial effusion.    No endobronchial lesion. Mosaic attenuation of the lungs, likely air trapping. Parenchymal opacities are noted in both lower lobes. Bilateral parenchymal infiltrates are suspected.  Area of tree-in-bud opacities in the right lower lobe (2:65), likely impacted distal airways. No pneumothorax.    The visualized upper abdomen is unremarkable.    Evaluation of the osseous structures demonstrates degenerative changes of the spine.    IMPRESSION:     Bibasilar parenchymal infiltrates.    < end of copied text >

## 2020-01-23 NOTE — DISCHARGE NOTE PROVIDER - NSDCMRMEDTOKEN_GEN_ALL_CORE_FT
atorvastatin 40 mg oral tablet: 1 tab(s) orally once a day  enalapril 10 mg oral tablet: 1 tab(s) orally 2 times a day  ezetimibe 10 mg oral tablet: 1 tab(s) orally once a day  glipiZIDE 10 mg oral tablet: 1 tab(s) orally once a day  sotalol 80 mg oral tablet: 1 tab(s) orally 2 times a day atorvastatin 40 mg oral tablet: 1 tab(s) orally once a day  cefuroxime 500 mg oral tablet: 1 tab(s) orally every 12 hours   enalapril 10 mg oral tablet: 1 tab(s) orally 2 times a day  ezetimibe 10 mg oral tablet: 1 tab(s) orally once a day  glipiZIDE 10 mg oral tablet: 1 tab(s) orally once a day  sotalol 80 mg oral tablet: 1 tab(s) orally 2 times a day

## 2020-01-23 NOTE — PROGRESS NOTE ADULT - ASSESSMENT
78 y/o F (Faroese speaking) with PMH of HTN, T2DM, significant carotid stenosis, and atrial fibrillation referred by PMD Dr. Tatum for confusion which began acutely on 1/16. +congested, nonproductive cough. CT chest with bilateral PNA. Since admission, confusion is improved but still not baseline CT head negative, CT neck with 100% L ICA occlusion which is chronic per granddaughter.

## 2020-01-23 NOTE — PROGRESS NOTE ADULT - ASSESSMENT
80 yo femal with HTN,DM, and A.Fib admitted with a cough x 2 weeks and 3 days of confusion,  Patient and son not aware of any fever.  Viral URI's have been circulating in household.  She denies any headache and neuro feels confusion is toxic metabolic.  CT scan sugests pneumonia.  RVP is negative, legionella urine antigen is negative  PC elevated at .42  Marginal improvement in confusion.  WBC decreased to 9800 ,000 from 13,000  Mycoplasma serology is pending  Suggest:  1.continue CTX and doxy, day 5, I think we can limit to 5 day course  2.? Additional neuro w/u if improvement does not continue, will defer to medicine and neurology.Language barrier makes it difficult to assess.  3. No signs of active infection.

## 2020-01-24 ENCOUNTER — TRANSCRIPTION ENCOUNTER (OUTPATIENT)
Age: 80
End: 2020-01-24

## 2020-01-24 VITALS — WEIGHT: 129.85 LBS

## 2020-01-24 DIAGNOSIS — Z71.89 OTHER SPECIFIED COUNSELING: ICD-10-CM

## 2020-01-24 LAB
ANION GAP SERPL CALC-SCNC: 13 MMOL/L — SIGNIFICANT CHANGE UP (ref 5–17)
BASOPHILS # BLD AUTO: 0.04 K/UL — SIGNIFICANT CHANGE UP (ref 0–0.2)
BASOPHILS NFR BLD AUTO: 0.6 % — SIGNIFICANT CHANGE UP (ref 0–2)
BUN SERPL-MCNC: 16 MG/DL — SIGNIFICANT CHANGE UP (ref 7–23)
CALCIUM SERPL-MCNC: 9 MG/DL — SIGNIFICANT CHANGE UP (ref 8.4–10.5)
CHLORIDE SERPL-SCNC: 100 MMOL/L — SIGNIFICANT CHANGE UP (ref 96–108)
CO2 SERPL-SCNC: 23 MMOL/L — SIGNIFICANT CHANGE UP (ref 22–31)
CREAT SERPL-MCNC: 0.76 MG/DL — SIGNIFICANT CHANGE UP (ref 0.5–1.3)
EOSINOPHIL # BLD AUTO: 0.12 K/UL — SIGNIFICANT CHANGE UP (ref 0–0.5)
EOSINOPHIL NFR BLD AUTO: 1.7 % — SIGNIFICANT CHANGE UP (ref 0–6)
GLUCOSE BLDC GLUCOMTR-MCNC: 183 MG/DL — HIGH (ref 70–99)
GLUCOSE BLDC GLUCOMTR-MCNC: 207 MG/DL — HIGH (ref 70–99)
GLUCOSE SERPL-MCNC: 221 MG/DL — HIGH (ref 70–99)
HCT VFR BLD CALC: 38 % — SIGNIFICANT CHANGE UP (ref 34.5–45)
HGB BLD-MCNC: 12.5 G/DL — SIGNIFICANT CHANGE UP (ref 11.5–15.5)
IMM GRANULOCYTES NFR BLD AUTO: 1.9 % — HIGH (ref 0–1.5)
LYMPHOCYTES # BLD AUTO: 1.68 K/UL — SIGNIFICANT CHANGE UP (ref 1–3.3)
LYMPHOCYTES # BLD AUTO: 24 % — SIGNIFICANT CHANGE UP (ref 13–44)
MCHC RBC-ENTMCNC: 30.2 PG — SIGNIFICANT CHANGE UP (ref 27–34)
MCHC RBC-ENTMCNC: 32.9 GM/DL — SIGNIFICANT CHANGE UP (ref 32–36)
MCV RBC AUTO: 91.8 FL — SIGNIFICANT CHANGE UP (ref 80–100)
MONOCYTES # BLD AUTO: 0.69 K/UL — SIGNIFICANT CHANGE UP (ref 0–0.9)
MONOCYTES NFR BLD AUTO: 9.8 % — SIGNIFICANT CHANGE UP (ref 2–14)
NEUTROPHILS # BLD AUTO: 4.35 K/UL — SIGNIFICANT CHANGE UP (ref 1.8–7.4)
NEUTROPHILS NFR BLD AUTO: 62 % — SIGNIFICANT CHANGE UP (ref 43–77)
PLATELET # BLD AUTO: 310 K/UL — SIGNIFICANT CHANGE UP (ref 150–400)
POTASSIUM SERPL-MCNC: 4.1 MMOL/L — SIGNIFICANT CHANGE UP (ref 3.5–5.3)
POTASSIUM SERPL-SCNC: 4.1 MMOL/L — SIGNIFICANT CHANGE UP (ref 3.5–5.3)
RBC # BLD: 4.14 M/UL — SIGNIFICANT CHANGE UP (ref 3.8–5.2)
RBC # FLD: 13 % — SIGNIFICANT CHANGE UP (ref 10.3–14.5)
SODIUM SERPL-SCNC: 136 MMOL/L — SIGNIFICANT CHANGE UP (ref 135–145)
WBC # BLD: 7.01 K/UL — SIGNIFICANT CHANGE UP (ref 3.8–10.5)
WBC # FLD AUTO: 7.01 K/UL — SIGNIFICANT CHANGE UP (ref 3.8–10.5)

## 2020-01-24 PROCEDURE — 82330 ASSAY OF CALCIUM: CPT

## 2020-01-24 PROCEDURE — 71045 X-RAY EXAM CHEST 1 VIEW: CPT

## 2020-01-24 PROCEDURE — 82435 ASSAY OF BLOOD CHLORIDE: CPT

## 2020-01-24 PROCEDURE — 84132 ASSAY OF SERUM POTASSIUM: CPT

## 2020-01-24 PROCEDURE — 82803 BLOOD GASES ANY COMBINATION: CPT

## 2020-01-24 PROCEDURE — 86738 MYCOPLASMA ANTIBODY: CPT

## 2020-01-24 PROCEDURE — 87633 RESP VIRUS 12-25 TARGETS: CPT

## 2020-01-24 PROCEDURE — 93005 ELECTROCARDIOGRAM TRACING: CPT

## 2020-01-24 PROCEDURE — 84436 ASSAY OF TOTAL THYROXINE: CPT

## 2020-01-24 PROCEDURE — 80053 COMPREHEN METABOLIC PANEL: CPT

## 2020-01-24 PROCEDURE — 85014 HEMATOCRIT: CPT

## 2020-01-24 PROCEDURE — 87581 M.PNEUMON DNA AMP PROBE: CPT

## 2020-01-24 PROCEDURE — 80048 BASIC METABOLIC PNL TOTAL CA: CPT

## 2020-01-24 PROCEDURE — 99285 EMERGENCY DEPT VISIT HI MDM: CPT

## 2020-01-24 PROCEDURE — 83036 HEMOGLOBIN GLYCOSYLATED A1C: CPT

## 2020-01-24 PROCEDURE — 82947 ASSAY GLUCOSE BLOOD QUANT: CPT

## 2020-01-24 PROCEDURE — 82962 GLUCOSE BLOOD TEST: CPT

## 2020-01-24 PROCEDURE — 71250 CT THORAX DX C-: CPT

## 2020-01-24 PROCEDURE — 94640 AIRWAY INHALATION TREATMENT: CPT

## 2020-01-24 PROCEDURE — 87486 CHLMYD PNEUM DNA AMP PROBE: CPT

## 2020-01-24 PROCEDURE — 70450 CT HEAD/BRAIN W/O DYE: CPT

## 2020-01-24 PROCEDURE — 82607 VITAMIN B-12: CPT

## 2020-01-24 PROCEDURE — 85027 COMPLETE CBC AUTOMATED: CPT

## 2020-01-24 PROCEDURE — 87086 URINE CULTURE/COLONY COUNT: CPT

## 2020-01-24 PROCEDURE — 84145 PROCALCITONIN (PCT): CPT

## 2020-01-24 PROCEDURE — 84295 ASSAY OF SERUM SODIUM: CPT

## 2020-01-24 PROCEDURE — 76705 ECHO EXAM OF ABDOMEN: CPT

## 2020-01-24 PROCEDURE — 87040 BLOOD CULTURE FOR BACTERIA: CPT

## 2020-01-24 PROCEDURE — 84443 ASSAY THYROID STIM HORMONE: CPT

## 2020-01-24 PROCEDURE — 70498 CT ANGIOGRAPHY NECK: CPT

## 2020-01-24 PROCEDURE — 81001 URINALYSIS AUTO W/SCOPE: CPT

## 2020-01-24 PROCEDURE — 87798 DETECT AGENT NOS DNA AMP: CPT

## 2020-01-24 PROCEDURE — 83605 ASSAY OF LACTIC ACID: CPT

## 2020-01-24 PROCEDURE — 70496 CT ANGIOGRAPHY HEAD: CPT

## 2020-01-24 PROCEDURE — 87449 NOS EACH ORGANISM AG IA: CPT

## 2020-01-24 RX ORDER — CEFUROXIME AXETIL 250 MG
1 TABLET ORAL
Qty: 6 | Refills: 0
Start: 2020-01-24 | End: 2020-01-26

## 2020-01-24 RX ADMIN — Medication 3 MILLILITER(S): at 06:30

## 2020-01-24 RX ADMIN — Medication 3 MILLILITER(S): at 00:21

## 2020-01-24 RX ADMIN — Medication 20 MILLIGRAM(S): at 06:31

## 2020-01-24 RX ADMIN — Medication 3 MILLILITER(S): at 13:02

## 2020-01-24 RX ADMIN — Medication 1: at 13:02

## 2020-01-24 RX ADMIN — Medication 1200 MILLIGRAM(S): at 06:31

## 2020-01-24 RX ADMIN — Medication 80 MILLIGRAM(S): at 06:31

## 2020-01-24 RX ADMIN — Medication 2: at 08:25

## 2020-01-24 RX ADMIN — ENOXAPARIN SODIUM 40 MILLIGRAM(S): 100 INJECTION SUBCUTANEOUS at 13:02

## 2020-01-24 RX ADMIN — POLYETHYLENE GLYCOL 3350 17 GRAM(S): 17 POWDER, FOR SOLUTION ORAL at 13:02

## 2020-01-24 NOTE — DIETITIAN INITIAL EVALUATION ADULT. - OTHER INFO
Pertinent Information: 79F (Latvian speaking) with PMHx of HTN, T2DM, significant carotid stenosis, and atrial fibrillation referred by PMD Dr. Tatum for confusion and noted fever for three days, had viral URI. CT scan suggests pneumonia.    Patient seen with sister at bedside, pt Latvian speaking preferred sister to translate. Sister reports pt with overall good PO intake and appetite PTA, consumes 3 meals per day consisting of a variety of foods. Confirms NKFA. Denies pt follows any specific dietary restrictions but does limit sweets in the diet and does not consume soda or juice. Pt noted with T2DM, checks BG 1x per day with values 98-105mg/dL, HBA1c: 7.8%, takes oral medication.     Weight: sister reports pt with overall stable weight, reports UBW as ~ 130lbs, reports pt may have lost a few pounds due to feeling unwell, but denies any significant wt changes. States pt has always been slender. Current dosing wt noted as 125lbs.     Since admission sister reports pt with fair PO intake, consuming 50-75% of meals, reports this is close to her baseline. Denies any acute GI distress, last BM 1/21. Briefly reviewed Carbohydrate Consistent diet including sources of carbohydrates, portion sizes, pairing protein with carbohydrates, continuing to limiting sugar sweetened beverages in diet and the importance of consistent eating pattern to help optimize glycemic control. Patient with no nutrition-related questions at this time. Made aware RD remains available as needed.

## 2020-01-24 NOTE — PROGRESS NOTE ADULT - SUBJECTIVE AND OBJECTIVE BOX
Follow-up Pulm Progress Note    No new respiratory events overnight.  Denies SOB/CP.   Lingering cough but much improved  94% on RA    Medications:  MEDICATIONS  (STANDING):  albuterol/ipratropium for Nebulization 3 milliLiter(s) Nebulizer every 6 hours  atorvastatin 40 milliGRAM(s) Oral at bedtime  dextrose 5%. 1000 milliLiter(s) (50 mL/Hr) IV Continuous <Continuous>  dextrose 50% Injectable 12.5 Gram(s) IV Push once  dextrose 50% Injectable 25 Gram(s) IV Push once  dextrose 50% Injectable 25 Gram(s) IV Push once  enalapril 20 milliGRAM(s) Oral two times a day  enoxaparin Injectable 40 milliGRAM(s) SubCutaneous daily  guaiFENesin ER 1200 milliGRAM(s) Oral every 12 hours  insulin lispro (HumaLOG) corrective regimen sliding scale   SubCutaneous three times a day before meals  insulin lispro (HumaLOG) corrective regimen sliding scale   SubCutaneous at bedtime  polyethylene glycol 3350 17 Gram(s) Oral daily  sotalol 80 milliGRAM(s) Oral two times a day    MEDICATIONS  (PRN):  acetaminophen   Tablet .. 650 milliGRAM(s) Oral every 6 hours PRN Mild Pain (1 - 3), Moderate Pain (4 - 6)  dextrose 40% Gel 15 Gram(s) Oral once PRN Blood Glucose LESS THAN 70 milliGRAM(s)/deciliter  glucagon  Injectable 1 milliGRAM(s) IntraMuscular once PRN Glucose LESS THAN 70 milligrams/deciliter          Vital Signs Last 24 Hrs  T(C): 36.7 (24 Jan 2020 09:15), Max: 37.3 (23 Jan 2020 20:16)  T(F): 98 (24 Jan 2020 09:15), Max: 99.1 (23 Jan 2020 20:16)  HR: 78 (24 Jan 2020 09:15) (68 - 78)  BP: 125/81 (24 Jan 2020 09:15) (125/81 - 178/79)  BP(mean): --  RR: 18 (24 Jan 2020 09:15) (18 - 18)  SpO2: 96% (24 Jan 2020 09:15) (93% - 97%) on RA          01-23 @ 07:01  -  01-24 @ 07:00  --------------------------------------------------------  IN: 390 mL / OUT: 0 mL / NET: 390 mL          LABS:                        12.5   7.01  )-----------( 310      ( 24 Jan 2020 08:36 )             38.0     01-24    136  |  100  |  16  ----------------------------<  221<H>  4.1   |  23  |  0.76    Ca    9.0      24 Jan 2020 07:27            CAPILLARY BLOOD GLUCOSE      POCT Blood Glucose.: 207 mg/dL (24 Jan 2020 08:17)                        CULTURES: (if applicable)  Culture Results:   No growth at 5 days. (01-18 @ 20:14)  Culture Results:   No growth at 5 days. (01-18 @ 20:14)  Culture Results:   No growth (01-18 @ 20:00)          Physical Examination:  PULM: Trace crackles L base  CVS: S1, S2 heard    RADIOLOGY REVIEWED  CT chest: < from: CT Chest No Cont (01.18.20 @ 19:53) >  FINDINGS:    No axillary adenopathy. Small mediastinal lymph nodes. The thyroid is enlarged.    Calcifications of the aortic root and coronary arteries. The heart is normal in size.No pericardial effusion.    No endobronchial lesion. Mosaic attenuation of the lungs, likely air trapping. Parenchymal opacities are noted in both lower lobes. Bilateral parenchymal infiltrates are suspected.  Area of tree-in-bud opacities in the right lower lobe (2:65), likely impacted distal airways. No pneumothorax.    The visualized upper abdomen is unremarkable.    Evaluation of the osseous structures demonstrates degenerative changes of the spine.    IMPRESSION:     Bibasilar parenchymal infiltrates.    < end of copied text >

## 2020-01-24 NOTE — DIETITIAN INITIAL EVALUATION ADULT. - PHYSICAL APPEARANCE
other (specify)/well nourished Ht: 65 inches , Wt: 125lbs, BMI: 20.8kg/m2, IBW: 125lbs +/- 10%, %IBW: 100%  Edema: none, Skin: free of pressure injuries per nursing flow sheets

## 2020-01-24 NOTE — PROGRESS NOTE ADULT - PROBLEM SELECTOR PROBLEM 2
Pneumonia, bacterial
need for outpatient follow-up

## 2020-01-24 NOTE — PROGRESS NOTE ADULT - SUBJECTIVE AND OBJECTIVE BOX
CC: f/u for pneumonia    Patient reports: no complaints, cough is better, no confusion as per friend at bedside    REVIEW OF SYSTEMS:  All other review of systems negative (Comprehensive ROS)    Antimicrobials Day #  :s/p 5 days of CTX and doxy    Other Medications Reviewed    T(F): 98 (01-24-20 @ 09:15), Max: 99.1 (01-23-20 @ 20:16)  HR: 78 (01-24-20 @ 09:15)  BP: 125/81 (01-24-20 @ 09:15)  RR: 18 (01-24-20 @ 09:15)  SpO2: 96% (01-24-20 @ 09:15)  Wt(kg): --    PHYSICAL EXAM:  General: alert, no acute distress  Eyes:  anicteric, no conjunctival injection, no discharge  Oropharynx: no lesions or injection 	  Neck: supple, without adenopathy  Lungs: clear to auscultation  Heart: irregular rate and rhythm; no murmur, rubs or gallops  Abdomen: soft, nondistended, nontender, without mass or organomegaly  Skin: no lesions  Extremities: no clubbing, cyanosis, or edema  Neurologic: alert, oriented, moves all extremities    LAB RESULTS:                        12.5   7.01  )-----------( 310      ( 24 Jan 2020 08:36 )             38.0     01-24    136  |  100  |  16  ----------------------------<  221<H>  4.1   |  23  |  0.76    Ca    9.0      24 Jan 2020 07:27          MICROBIOLOGY:  RECENT CULTURES:      RADIOLOGY REVIEWED:

## 2020-01-24 NOTE — PROGRESS NOTE ADULT - REASON FOR ADMISSION
Confusion

## 2020-01-24 NOTE — PROGRESS NOTE ADULT - SUBJECTIVE AND OBJECTIVE BOX
Patient is a 79y old  Female who presents with a chief complaint of Confusion (24 Jan 2020 12:01)      SUBJECTIVE / OVERNIGHT EVENTS: Comfortable without new complaints. Family at bedside.   Review of Systems  chest pain no  palpitations no  sob no  nausea no  headache no    MEDICATIONS  (STANDING):  albuterol/ipratropium for Nebulization 3 milliLiter(s) Nebulizer every 6 hours  atorvastatin 40 milliGRAM(s) Oral at bedtime  dextrose 5%. 1000 milliLiter(s) (50 mL/Hr) IV Continuous <Continuous>  dextrose 50% Injectable 12.5 Gram(s) IV Push once  dextrose 50% Injectable 25 Gram(s) IV Push once  dextrose 50% Injectable 25 Gram(s) IV Push once  enalapril 20 milliGRAM(s) Oral two times a day  enoxaparin Injectable 40 milliGRAM(s) SubCutaneous daily  guaiFENesin ER 1200 milliGRAM(s) Oral every 12 hours  insulin lispro (HumaLOG) corrective regimen sliding scale   SubCutaneous three times a day before meals  insulin lispro (HumaLOG) corrective regimen sliding scale   SubCutaneous at bedtime  polyethylene glycol 3350 17 Gram(s) Oral daily  sotalol 80 milliGRAM(s) Oral two times a day    MEDICATIONS  (PRN):  acetaminophen   Tablet .. 650 milliGRAM(s) Oral every 6 hours PRN Mild Pain (1 - 3), Moderate Pain (4 - 6)  dextrose 40% Gel 15 Gram(s) Oral once PRN Blood Glucose LESS THAN 70 milliGRAM(s)/deciliter  glucagon  Injectable 1 milliGRAM(s) IntraMuscular once PRN Glucose LESS THAN 70 milligrams/deciliter      Vital Signs Last 24 Hrs  T(C): 37.1 (24 Jan 2020 14:04), Max: 37.3 (23 Jan 2020 20:16)  T(F): 98.8 (24 Jan 2020 14:04), Max: 99.1 (23 Jan 2020 20:16)  HR: 78 (24 Jan 2020 14:04) (68 - 78)  BP: 143/82 (24 Jan 2020 14:04) (125/81 - 178/79)  BP(mean): --  RR: 18 (24 Jan 2020 14:04) (18 - 18)  SpO2: 97% (24 Jan 2020 14:04) (95% - 97%)    PHYSICAL EXAM:  GENERAL: NAD, well-developed  HEAD:  Atraumatic, Normocephalic  EYES: EOMI, PERRLA, conjunctiva and sclera clear  NECK: Supple, No JVD  CHEST/LUNG: Clear to auscultation bilaterally; No wheeze  HEART: Regular rate and rhythm; No murmurs, rubs, or gallops  ABDOMEN: Soft, Nontender, Nondistended; Bowel sounds present  EXTREMITIES:  2+ Peripheral Pulses, No clubbing, cyanosis, or edema  PSYCH: AAOx3  NEUROLOGY: non-focal  SKIN: No rashes or lesions    LABS:                        12.5   7.01  )-----------( 310      ( 24 Jan 2020 08:36 )             38.0     01-24    136  |  100  |  16  ----------------------------<  221<H>  4.1   |  23  |  0.76    Ca    9.0      24 Jan 2020 07:27                  RADIOLOGY & ADDITIONAL TESTS:    Imaging Personally Reviewed:    Consultant(s) Notes Reviewed:      Care Discussed with Consultants/Other Providers:

## 2020-01-24 NOTE — PROGRESS NOTE ADULT - ASSESSMENT
80 yo femal with HTN,DM, and A.Fib admitted with a cough x 2 weeks and 3 days of confusion,  Patient and son not aware of any fever.  Viral URI's have been circulating in household.  She denies any headache and neuro feels confusion is toxic metabolic.  CT scan sugests pneumonia.  RVP is negative, legionella urine antigen is negative  PC elevated at .42  Gradual improvement in confusion.  WBC decreased to 7000  from 13,000  Mycoplasma serology is pending but will not effect management  Suggest:  1.monitor off antibiotics  2.? Additional neuro w/u if improvement does not continue, will defer to medicine and neurology.Language barrier makes it difficult to assess.  3. No signs of active infection.No ID objection to discharge planning

## 2020-01-24 NOTE — DIETITIAN INITIAL EVALUATION ADULT. - ADD RECOMMEND
1) Continue current diet as tolerated. 2) Encourage PO intake, protein with meals. 3) RD to remain available and follow-up as medically appropriate.

## 2020-01-24 NOTE — PROGRESS NOTE ADULT - ASSESSMENT
79F (Gibraltarian speaking) with PMHx of HTN, T2DM, significant carotid stenosis, and atrial fibrillation referred by PMD Dr. Tatum for confusion and noted fever for three days. Patient's family endorsing sick contacts with family suffering from viral URI. Patient slightly febrile here with minor hypoxic respiratory failure which corrects with 2 L NC. Labs are significant for neutrophilic pre-dominant leukocytosis, slight transaminitis and slight pyuria. CXR showing possible left lower lobe opacity with CT chest being read as bibasilar atelectasis.     There is suspicion that patient has encephalopathy secondary to infectious etiology, possibly the lung. Given her slight acute hypoxic respiratory failure will treat empirically for pneumonia. Patient with no dysuria and only mild pyuria. She has a history of atrial fibrillation and bilateral carotid stenosis, would need to consider CT head and primary neurological issue if she does not improve on antibiotics.     Encephalopathy acute improving .   - Treat as possible infectious etiology with antibiotics  - Neurology follow noted    Pneumonia.   - s/p Ceftriaxone and Doxy. Continue Ceftin 500 bid for 3 days.  - ID follow noted  - Pulmonary follow     Atrial fibrillation by history ( few years ago). Now in SR  -Continue with Sotalol (QTc 462)  -Patient not on anti-coagulation now. Will defer to primary care physician and her Cardiologist     Diabetes mellitus, type II.   - hold Glipizide while inpatient  - FS TID AC and Insulin sliding scale.     HTN (hypertension).  - Increase Enalapril 20 mg BID.     Carotid stenosis.   - Continue with Lipitor 40  - Hold Ezetimibe for now.    Pyuria.   - No dysuria, antibiotic for pneumonia should cover urine organisms    Transaminitis.  - Continue to trend and avoid hepatotoxic medication    Constipation  - bowel regimen    d/w patient, daughter and relative at bedside.     DC home. Follow with PMD/ Cardiology in 3-4 days.     Noe Lacey MD pager [phone]

## 2020-01-24 NOTE — PROGRESS NOTE ADULT - ASSESSMENT
78 y/o F (Czech speaking) with PMH of HTN, T2DM, significant carotid stenosis, and atrial fibrillation referred by PMD Dr. Tatum for confusion which began acutely on 1/16. +congested, nonproductive cough. CT chest with bilateral PNA. Since admission, confusion is improved but still not baseline CT head negative, CT neck with 100% L ICA occlusion which is chronic per granddaughter.

## 2020-01-24 NOTE — PROGRESS NOTE ADULT - PROBLEM SELECTOR PLAN 2
Bilateral infiltrates on CT chest   -RVP negative  -Urine legionella negative  -s/p 5 days of Ceftriaxone/doxy  -Duoneb q6, can discharge with Albuterol HFA PRN  -Mucinex BID x1 more week  -Encouraged secretion clearance.  -f/u Mycoplasma IgM

## 2020-01-24 NOTE — DISCHARGE NOTE NURSING/CASE MANAGEMENT/SOCIAL WORK - PATIENT PORTAL LINK FT
You can access the FollowMyHealth Patient Portal offered by [facility] by registering at the following website: [email or web address]. By joining [employer]’s FollowMyHealth portal, you will also be able to view your health information using other applications (apps) compatible with our system.

## 2020-01-30 LAB
M PNEUMO IGG SER IA-ACNC: 0.85 INDEX — SIGNIFICANT CHANGE UP
M PNEUMO IGG SER IA-ACNC: NEGATIVE — SIGNIFICANT CHANGE UP

## 2020-01-31 LAB
M PNEUMO IGM SER-ACNC: 138 UNITS/ML — SIGNIFICANT CHANGE UP
MYCOPLASMA AG SPEC QL: NEGATIVE — SIGNIFICANT CHANGE UP

## 2021-04-26 NOTE — DIETITIAN INITIAL EVALUATION ADULT. - PROBLEM SELECTOR PLAN 6
The patient is a 26y Female complaining of vaginal spotting. -Continue with Lipitor 40  -Hold Ezetimibe for now

## 2021-10-06 NOTE — ED ADULT TRIAGE NOTE - WEIGHT METHOD
Complex Repair And A-T Advancement Flap Text: The defect edges were debeveled with a #15 scalpel blade.  The primary defect was closed partially with a complex linear closure.  Given the location of the remaining defect, shape of the defect and the proximity to free margins an A-T advancement flap was deemed most appropriate for complete closure of the defect.  Using a sterile surgical marker, an appropriate advancement flap was drawn incorporating the defect and placing the expected incisions within the relaxed skin tension lines where possible.    The area thus outlined was incised deep to adipose tissue with a #15 scalpel blade.  The skin margins were undermined to an appropriate distance in all directions utilizing iris scissors. stated

## 2022-06-15 NOTE — PATIENT PROFILE ADULT - BRADEN MOBILITY
Internal Medicine Internal Medicine Gastroenterology Internal Medicine Gastroenterology Gastroenterology Gastroenterology Neurology Internal Medicine (3) slightly limited

## 2024-10-02 NOTE — PATIENT PROFILE ADULT - DO YOU FEEL UNSAFE AT HOME, WORK, OR SCHOOL?
Cut the Toprol in 1/2 at night     Use the lasix only as needed if you wake with shortness of breath or     Maintain good blood pressure control-goal<130/80 at rest  Maintain good cholesterol control LDL goal<70 with arterial disease  If you are diabetic work to keep/obtain hemoglobin A1c< 7    Follow up in 6-8 weeks   Call with any questions or concerns  Follow up with Ciara Salomon, DO for non cardiac problems  Report any new problems  Cardiovascular Fitness-Exercise as tolerated.  Strive for 30 minutes of exercise most days of the week.    Cardiac / Healthy Diet- Avoid processed high fat foods, maintain low sodium/salt   Continue current medications as directed  Continue plan of treatment  It is always recommended that you bring your medications bottles with you to each visit - this is for your safety!   
no

## 2024-10-25 ENCOUNTER — INPATIENT (INPATIENT)
Facility: HOSPITAL | Age: 84
LOS: 4 days | Discharge: NURSING, REHAB OR LONG-TERM CARE | DRG: 638 | End: 2024-10-30
Attending: INTERNAL MEDICINE | Admitting: INTERNAL MEDICINE
Payer: MEDICARE

## 2024-10-25 VITALS
SYSTOLIC BLOOD PRESSURE: 131 MMHG | OXYGEN SATURATION: 100 % | DIASTOLIC BLOOD PRESSURE: 76 MMHG | RESPIRATION RATE: 20 BRPM | TEMPERATURE: 98 F | HEART RATE: 84 BPM

## 2024-10-25 PROCEDURE — 99285 EMERGENCY DEPT VISIT HI MDM: CPT | Mod: GC

## 2024-10-26 DIAGNOSIS — I10 ESSENTIAL (PRIMARY) HYPERTENSION: ICD-10-CM

## 2024-10-26 DIAGNOSIS — E78.5 HYPERLIPIDEMIA, UNSPECIFIED: ICD-10-CM

## 2024-10-26 DIAGNOSIS — E11.65 TYPE 2 DIABETES MELLITUS WITH HYPERGLYCEMIA: ICD-10-CM

## 2024-10-26 DIAGNOSIS — N39.0 URINARY TRACT INFECTION, SITE NOT SPECIFIED: ICD-10-CM

## 2024-10-26 PROBLEM — I48.91 UNSPECIFIED ATRIAL FIBRILLATION: Chronic | Status: ACTIVE | Noted: 2020-01-18

## 2024-10-26 LAB
A1C WITH ESTIMATED AVERAGE GLUCOSE RESULT: 12.4 % — HIGH (ref 4–5.6)
ALBUMIN SERPL ELPH-MCNC: 3.4 G/DL — SIGNIFICANT CHANGE UP (ref 3.3–5)
ALBUMIN SERPL ELPH-MCNC: 3.8 G/DL — SIGNIFICANT CHANGE UP (ref 3.3–5)
ALP SERPL-CCNC: 113 U/L — SIGNIFICANT CHANGE UP (ref 40–120)
ALP SERPL-CCNC: 134 U/L — HIGH (ref 40–120)
ALT FLD-CCNC: 23 U/L — SIGNIFICANT CHANGE UP (ref 10–45)
ALT FLD-CCNC: 28 U/L — SIGNIFICANT CHANGE UP (ref 10–45)
ANION GAP SERPL CALC-SCNC: 13 MMOL/L — SIGNIFICANT CHANGE UP (ref 5–17)
ANION GAP SERPL CALC-SCNC: 17 MMOL/L — SIGNIFICANT CHANGE UP (ref 5–17)
APPEARANCE UR: CLEAR — SIGNIFICANT CHANGE UP
AST SERPL-CCNC: 19 U/L — SIGNIFICANT CHANGE UP (ref 10–40)
AST SERPL-CCNC: 26 U/L — SIGNIFICANT CHANGE UP (ref 10–40)
B-OH-BUTYR SERPL-SCNC: 0.2 MMOL/L — SIGNIFICANT CHANGE UP
BACTERIA # UR AUTO: ABNORMAL /HPF
BASOPHILS # BLD AUTO: 0.04 K/UL — SIGNIFICANT CHANGE UP (ref 0–0.2)
BASOPHILS NFR BLD AUTO: 0.3 % — SIGNIFICANT CHANGE UP (ref 0–2)
BILIRUB SERPL-MCNC: 0.5 MG/DL — SIGNIFICANT CHANGE UP (ref 0.2–1.2)
BILIRUB SERPL-MCNC: 0.5 MG/DL — SIGNIFICANT CHANGE UP (ref 0.2–1.2)
BILIRUB UR-MCNC: NEGATIVE — SIGNIFICANT CHANGE UP
BUN SERPL-MCNC: 61 MG/DL — HIGH (ref 7–23)
BUN SERPL-MCNC: 74 MG/DL — HIGH (ref 7–23)
CALCIUM SERPL-MCNC: 10 MG/DL — SIGNIFICANT CHANGE UP (ref 8.4–10.5)
CALCIUM SERPL-MCNC: 9.5 MG/DL — SIGNIFICANT CHANGE UP (ref 8.4–10.5)
CAST: 2 /LPF — SIGNIFICANT CHANGE UP (ref 0–4)
CHLORIDE SERPL-SCNC: 105 MMOL/L — SIGNIFICANT CHANGE UP (ref 96–108)
CHLORIDE SERPL-SCNC: 108 MMOL/L — SIGNIFICANT CHANGE UP (ref 96–108)
CO2 SERPL-SCNC: 20 MMOL/L — LOW (ref 22–31)
CO2 SERPL-SCNC: 22 MMOL/L — SIGNIFICANT CHANGE UP (ref 22–31)
COLOR SPEC: YELLOW — SIGNIFICANT CHANGE UP
CREAT SERPL-MCNC: 1.09 MG/DL — SIGNIFICANT CHANGE UP (ref 0.5–1.3)
CREAT SERPL-MCNC: 1.3 MG/DL — SIGNIFICANT CHANGE UP (ref 0.5–1.3)
DIFF PNL FLD: ABNORMAL
EGFR: 41 ML/MIN/1.73M2 — LOW
EGFR: 51 ML/MIN/1.73M2 — LOW
EOSINOPHIL # BLD AUTO: 0.07 K/UL — SIGNIFICANT CHANGE UP (ref 0–0.5)
EOSINOPHIL NFR BLD AUTO: 0.6 % — SIGNIFICANT CHANGE UP (ref 0–6)
ESTIMATED AVERAGE GLUCOSE: 309 MG/DL — HIGH (ref 68–114)
GAS PNL BLDV: SIGNIFICANT CHANGE UP
GAS PNL BLDV: SIGNIFICANT CHANGE UP
GLUCOSE BLDC GLUCOMTR-MCNC: 170 MG/DL — HIGH (ref 70–99)
GLUCOSE BLDC GLUCOMTR-MCNC: 227 MG/DL — HIGH (ref 70–99)
GLUCOSE BLDC GLUCOMTR-MCNC: 372 MG/DL — HIGH (ref 70–99)
GLUCOSE BLDC GLUCOMTR-MCNC: 380 MG/DL — HIGH (ref 70–99)
GLUCOSE BLDC GLUCOMTR-MCNC: 437 MG/DL — HIGH (ref 70–99)
GLUCOSE SERPL-MCNC: 540 MG/DL — CRITICAL HIGH (ref 70–99)
GLUCOSE SERPL-MCNC: 686 MG/DL — CRITICAL HIGH (ref 70–99)
GLUCOSE UR QL: >=1000 MG/DL
HCT VFR BLD CALC: 46.7 % — HIGH (ref 34.5–45)
HGB BLD-MCNC: 14.4 G/DL — SIGNIFICANT CHANGE UP (ref 11.5–15.5)
IMM GRANULOCYTES NFR BLD AUTO: 1.5 % — HIGH (ref 0–0.9)
KETONES UR-MCNC: NEGATIVE MG/DL — SIGNIFICANT CHANGE UP
LEUKOCYTE ESTERASE UR-ACNC: ABNORMAL
LIDOCAIN IGE QN: 51 U/L — SIGNIFICANT CHANGE UP (ref 7–60)
LYMPHOCYTES # BLD AUTO: 19.3 % — SIGNIFICANT CHANGE UP (ref 13–44)
LYMPHOCYTES # BLD AUTO: 2.32 K/UL — SIGNIFICANT CHANGE UP (ref 1–3.3)
MAGNESIUM SERPL-MCNC: 3 MG/DL — HIGH (ref 1.6–2.6)
MCHC RBC-ENTMCNC: 29.3 PG — SIGNIFICANT CHANGE UP (ref 27–34)
MCHC RBC-ENTMCNC: 30.8 GM/DL — LOW (ref 32–36)
MCV RBC AUTO: 95.1 FL — SIGNIFICANT CHANGE UP (ref 80–100)
MONOCYTES # BLD AUTO: 0.96 K/UL — HIGH (ref 0–0.9)
MONOCYTES NFR BLD AUTO: 8 % — SIGNIFICANT CHANGE UP (ref 2–14)
NEUTROPHILS # BLD AUTO: 8.45 K/UL — HIGH (ref 1.8–7.4)
NEUTROPHILS NFR BLD AUTO: 70.3 % — SIGNIFICANT CHANGE UP (ref 43–77)
NITRITE UR-MCNC: POSITIVE
NRBC # BLD: 0 /100 WBCS — SIGNIFICANT CHANGE UP (ref 0–0)
OSMOLALITY SERPL: 368 MOSMOL/KG — HIGH (ref 280–301)
PH UR: 5 — SIGNIFICANT CHANGE UP (ref 5–8)
PHOSPHATE SERPL-MCNC: 4.4 MG/DL — SIGNIFICANT CHANGE UP (ref 2.5–4.5)
PLATELET # BLD AUTO: 206 K/UL — SIGNIFICANT CHANGE UP (ref 150–400)
POTASSIUM SERPL-MCNC: 4.4 MMOL/L — SIGNIFICANT CHANGE UP (ref 3.5–5.3)
POTASSIUM SERPL-MCNC: 5.4 MMOL/L — HIGH (ref 3.5–5.3)
POTASSIUM SERPL-SCNC: 4.4 MMOL/L — SIGNIFICANT CHANGE UP (ref 3.5–5.3)
POTASSIUM SERPL-SCNC: 5.4 MMOL/L — HIGH (ref 3.5–5.3)
PROT SERPL-MCNC: 6.9 G/DL — SIGNIFICANT CHANGE UP (ref 6–8.3)
PROT SERPL-MCNC: 8 G/DL — SIGNIFICANT CHANGE UP (ref 6–8.3)
PROT UR-MCNC: NEGATIVE MG/DL — SIGNIFICANT CHANGE UP
RBC # BLD: 4.91 M/UL — SIGNIFICANT CHANGE UP (ref 3.8–5.2)
RBC # FLD: 13.1 % — SIGNIFICANT CHANGE UP (ref 10.3–14.5)
RBC CASTS # UR COMP ASSIST: 3 /HPF — SIGNIFICANT CHANGE UP (ref 0–4)
REVIEW: SIGNIFICANT CHANGE UP
SODIUM SERPL-SCNC: 142 MMOL/L — SIGNIFICANT CHANGE UP (ref 135–145)
SODIUM SERPL-SCNC: 143 MMOL/L — SIGNIFICANT CHANGE UP (ref 135–145)
SP GR SPEC: >1.03 — HIGH (ref 1–1.03)
SQUAMOUS # UR AUTO: 4 /HPF — SIGNIFICANT CHANGE UP (ref 0–5)
UROBILINOGEN FLD QL: 0.2 MG/DL — SIGNIFICANT CHANGE UP (ref 0.2–1)
WBC # BLD: 12.02 K/UL — HIGH (ref 3.8–10.5)
WBC # FLD AUTO: 12.02 K/UL — HIGH (ref 3.8–10.5)
WBC UR QL: 50 /HPF — HIGH (ref 0–5)

## 2024-10-26 PROCEDURE — 71045 X-RAY EXAM CHEST 1 VIEW: CPT | Mod: 26

## 2024-10-26 PROCEDURE — 70450 CT HEAD/BRAIN W/O DYE: CPT | Mod: 26

## 2024-10-26 PROCEDURE — 99222 1ST HOSP IP/OBS MODERATE 55: CPT

## 2024-10-26 RX ORDER — INSULIN LISPRO 100/ML
VIAL (ML) SUBCUTANEOUS EVERY 4 HOURS
Refills: 0 | Status: DISCONTINUED | OUTPATIENT
Start: 2024-10-26 | End: 2024-10-27

## 2024-10-26 RX ORDER — INSULIN GLARGINE,HUM.REC.ANLOG 100/ML
10 VIAL (ML) SUBCUTANEOUS
Refills: 0 | Status: DISCONTINUED | OUTPATIENT
Start: 2024-10-26 | End: 2024-10-27

## 2024-10-26 RX ORDER — ASPIRIN/MAG CARB/ALUMINUM AMIN 325 MG
1 TABLET ORAL
Refills: 0 | DISCHARGE

## 2024-10-26 RX ORDER — CEFTRIAXONE SODIUM 10 G
1000 VIAL (EA) INJECTION EVERY 24 HOURS
Refills: 0 | Status: DISCONTINUED | OUTPATIENT
Start: 2024-10-26 | End: 2024-10-27

## 2024-10-26 RX ORDER — QUETIAPINE FUMARATE 200 MG/1
25 TABLET ORAL AT BEDTIME
Refills: 0 | Status: DISCONTINUED | OUTPATIENT
Start: 2024-10-26 | End: 2024-10-30

## 2024-10-26 RX ORDER — ESCITALOPRAM 10 MG/1
1 TABLET, FILM COATED ORAL
Refills: 0 | DISCHARGE

## 2024-10-26 RX ORDER — QUETIAPINE FUMARATE 200 MG/1
1 TABLET ORAL
Refills: 0 | DISCHARGE

## 2024-10-26 RX ORDER — INSULIN LISPRO 100/ML
3 VIAL (ML) SUBCUTANEOUS ONCE
Refills: 0 | Status: COMPLETED | OUTPATIENT
Start: 2024-10-26 | End: 2024-10-26

## 2024-10-26 RX ORDER — GLUCAGON INJECTION, SOLUTION 1 MG/.2ML
1 INJECTION, SOLUTION SUBCUTANEOUS ONCE
Refills: 0 | Status: DISCONTINUED | OUTPATIENT
Start: 2024-10-26 | End: 2024-10-30

## 2024-10-26 RX ORDER — ESCITALOPRAM 10 MG/1
10 TABLET, FILM COATED ORAL DAILY
Refills: 0 | Status: DISCONTINUED | OUTPATIENT
Start: 2024-10-26 | End: 2024-10-30

## 2024-10-26 RX ORDER — MEMANTINE HYDROCHLORIDE 21 MG/1
1 CAPSULE, EXTENDED RELEASE ORAL
Refills: 0 | DISCHARGE

## 2024-10-26 RX ORDER — ENALAPRIL MALEATE 10 MG
10 TABLET ORAL
Refills: 0 | Status: DISCONTINUED | OUTPATIENT
Start: 2024-10-26 | End: 2024-10-30

## 2024-10-26 RX ORDER — CEFTRIAXONE SODIUM 10 G
1000 VIAL (EA) INJECTION ONCE
Refills: 0 | Status: COMPLETED | OUTPATIENT
Start: 2024-10-26 | End: 2024-10-26

## 2024-10-26 RX ORDER — INSULIN GLARGINE,HUM.REC.ANLOG 100/ML
10 VIAL (ML) SUBCUTANEOUS ONCE
Refills: 0 | Status: COMPLETED | OUTPATIENT
Start: 2024-10-26 | End: 2024-10-26

## 2024-10-26 RX ORDER — SODIUM CHLORIDE 9 MG/ML
1000 INJECTION, SOLUTION INTRAMUSCULAR; INTRAVENOUS; SUBCUTANEOUS
Refills: 0 | Status: DISCONTINUED | OUTPATIENT
Start: 2024-10-26 | End: 2024-10-30

## 2024-10-26 RX ORDER — MEMANTINE HYDROCHLORIDE 21 MG/1
10 CAPSULE, EXTENDED RELEASE ORAL
Refills: 0 | Status: DISCONTINUED | OUTPATIENT
Start: 2024-10-26 | End: 2024-10-30

## 2024-10-26 RX ORDER — HEPARIN SODIUM 10000 [USP'U]/ML
5000 INJECTION INTRAVENOUS; SUBCUTANEOUS EVERY 8 HOURS
Refills: 0 | Status: DISCONTINUED | OUTPATIENT
Start: 2024-10-26 | End: 2024-10-30

## 2024-10-26 RX ORDER — ACETAMINOPHEN 500 MG
650 TABLET ORAL EVERY 6 HOURS
Refills: 0 | Status: DISCONTINUED | OUTPATIENT
Start: 2024-10-26 | End: 2024-10-30

## 2024-10-26 RX ORDER — ASPIRIN/MAG CARB/ALUMINUM AMIN 325 MG
325 TABLET ORAL DAILY
Refills: 0 | Status: DISCONTINUED | OUTPATIENT
Start: 2024-10-26 | End: 2024-10-30

## 2024-10-26 RX ADMIN — Medication 2: at 17:59

## 2024-10-26 RX ADMIN — ESCITALOPRAM 10 MILLIGRAM(S): 10 TABLET, FILM COATED ORAL at 11:32

## 2024-10-26 RX ADMIN — Medication 100 MILLIGRAM(S): at 13:20

## 2024-10-26 RX ADMIN — Medication 1000 MILLILITER(S): at 00:52

## 2024-10-26 RX ADMIN — SODIUM CHLORIDE 50 MILLILITER(S): 9 INJECTION, SOLUTION INTRAMUSCULAR; INTRAVENOUS; SUBCUTANEOUS at 22:28

## 2024-10-26 RX ADMIN — Medication 3 UNIT(S): at 08:58

## 2024-10-26 RX ADMIN — Medication 325 MILLIGRAM(S): at 11:32

## 2024-10-26 RX ADMIN — Medication 10 UNIT(S): at 11:11

## 2024-10-26 RX ADMIN — Medication 1000 MILLILITER(S): at 03:32

## 2024-10-26 RX ADMIN — Medication 1: at 22:41

## 2024-10-26 RX ADMIN — HEPARIN SODIUM 5000 UNIT(S): 10000 INJECTION INTRAVENOUS; SUBCUTANEOUS at 22:29

## 2024-10-26 RX ADMIN — HEPARIN SODIUM 5000 UNIT(S): 10000 INJECTION INTRAVENOUS; SUBCUTANEOUS at 13:06

## 2024-10-26 RX ADMIN — Medication 5: at 13:06

## 2024-10-26 RX ADMIN — Medication 10 MILLIGRAM(S): at 18:00

## 2024-10-26 RX ADMIN — Medication 40 MILLIGRAM(S): at 22:29

## 2024-10-26 RX ADMIN — MEMANTINE HYDROCHLORIDE 10 MILLIGRAM(S): 21 CAPSULE, EXTENDED RELEASE ORAL at 17:59

## 2024-10-26 RX ADMIN — QUETIAPINE FUMARATE 25 MILLIGRAM(S): 200 TABLET ORAL at 22:29

## 2024-10-26 RX ADMIN — Medication 10 UNIT(S): at 22:28

## 2024-10-26 RX ADMIN — Medication 100 MILLIGRAM(S): at 04:38

## 2024-10-26 RX ADMIN — Medication 40 MILLIGRAM(S): at 18:00

## 2024-10-26 RX ADMIN — SODIUM CHLORIDE 50 MILLILITER(S): 9 INJECTION, SOLUTION INTRAMUSCULAR; INTRAVENOUS; SUBCUTANEOUS at 11:31

## 2024-10-26 NOTE — ED ADULT NURSE NOTE - NSFALLHARMRISKINTERV_ED_ALL_ED

## 2024-10-26 NOTE — PATIENT PROFILE ADULT - HISTORY OF COVID-19 VACCINATION
Last office visit: 8/10/23  Next office visit: None scheduled, overdue  Per last office note: He says the Jardiance is helping he is taking 10 mg every morning     30 day supply submitted with reminder to schedule follow up visit.      Yes

## 2024-10-26 NOTE — ED ADULT NURSE REASSESSMENT NOTE - NS ED NURSE REASSESS COMMENT FT1
Pt straight cathed with MD order. Aseptic technique maintained. Two RNs at bedside during procedure. Catheter draining clear yellow urine. Pt tolerated procedure well. Safety maintained.

## 2024-10-26 NOTE — ED PROVIDER NOTE - OBJECTIVE STATEMENT
Patient is an 84 year old female with past medical history of diabetes, dementia baseline A&Ox1, and hypertension who presents to the emergency department after son measured glucose at home as "high" on glucose monitor. Patient unable to provide history secondary to dementia. History provided by son. Son states patient had pointed at her head, but is unsure if that means she had a headache, but otherwise he had not noticed any other symptoms including vomiting, diarrhea, fevers, or chills. Son states that after glucose was measured as high, he called patient's PCP who said to take 2 more glipizide which he gave about 2-3 hours prior to arrival for a total fo 15mg glipizide today.

## 2024-10-26 NOTE — ED PROVIDER NOTE - PROGRESS NOTE DETAILS
Jackelin Buitrago MD, PGY-1: Labs notable for UTI, will treat with ceftriaxone. Chest Xray does not show focal consolidation. Patient s/p 3L of LR. Plan for admission for UTI.

## 2024-10-26 NOTE — CONSULT NOTE ADULT - SUBJECTIVE AND OBJECTIVE BOX
Antonio Lobo MD   |   PGY-4  Endocrinology Fellow  Available on [employer] Teams    HPI:  84 year old female with past medical history of diabetes, dementia baseline A&Ox1, and hypertension who presents to the emergency department after son measured glucose at home as "high" on glucose monitor. Patient unable to provide history secondary to dementia. History provided by son. Son states patient had pointed at her head, but is unsure if that means she had a headache, but otherwise he had not noticed any other symptoms including vomiting, diarrhea, fevers, or chills. Son states that after glucose was measured as high, he called patient's PCP who said to take 2 more glipizide which he gave about 2-3 hours prior to arrival for a total fo 15mg glipizide today. (26 Oct 2024 10:49)      Endocrine History:   84F hx T2DM, dementia baseline A&Ox1, and hypertension presents for high glucose levels on glucometer and admitted for a UTI.   Endocrine consulted for hyperglycemia (600s, BHB 0.2).      Of note A1c was 11% 1 month ago with PCP – dietary changes were attempted and earlier this week with PCP A1c increased to 12 %     --Type of Diabetes: T2DM   --Diagnosis: ~2004   --A1c: 12.4%   --Outpatient regimen: Glipizide 5mg daily - PCP said to take 2 more glipizide which son gave about 2-3 hours prior to arrival for a total of 15mg glipizide before going to ED.   --Endocrinologist: Dr. Sophia Wilson – last seen in 2016   --Compliant with medications? Yes   --Side effects to medications? Patient denies pancreatitis, thyroid cancer, family hx of thyroid Cancer/MEN   --what medications have you tried in the past?  none   --who administers medications? Aid  and family – has 24/7 aid   --Home sugars/log: does not chech   --Any hypoglycemic episodes? Denies   --Any hx of DKA?  Denies   --Complications: Denies MI, CVA, Retinopathy, Neuropathy,  Nephropathy   --Family history: Brothers have DM – not sure what type   --Outpatient Diet: for the past month patient has been following a consistent carb diet.   --Appetite: Minimal   --Statin: atoarvastatin 40mg at home   --ACE/ARB: enalapril 10mg at home   --Insurance: medicare, medicaid   --Inpatient Diet? NPO   --Inpatient diabetes regimen? none   --On steroids inpatient? Not on   --BMI:         weight: 56.5kg     --GFR? 51     PAST MEDICAL & SURGICAL HISTORY:  Stenosis of carotid artery, bilateral      HTN (hypertension)      Hypercholesteremia      Diabetes mellitus, type II      Atrial fibrillation      Dementia      S/P appendectomy      S/P cataract extraction, right          MEDICATIONS  (STANDING):  aspirin 325 milliGRAM(s) Oral daily  atorvastatin 40 milliGRAM(s) Oral at bedtime  cefTRIAXone   IVPB 1000 milliGRAM(s) IV Intermittent every 24 hours  dextrose 5%. 1000 milliLiter(s) (100 mL/Hr) IV Continuous <Continuous>  dextrose 5%. 1000 milliLiter(s) (50 mL/Hr) IV Continuous <Continuous>  dextrose 50% Injectable 12.5 Gram(s) IV Push once  dextrose 50% Injectable 25 Gram(s) IV Push once  dextrose 50% Injectable 25 Gram(s) IV Push once  dextrose Oral Gel 15 Gram(s) Oral once  enalapril 10 milliGRAM(s) Oral two times a day  escitalopram 10 milliGRAM(s) Oral daily  glucagon  Injectable 1 milliGRAM(s) IntraMuscular once  heparin   Injectable 5000 Unit(s) SubCutaneous every 8 hours  insulin lispro (ADMELOG) corrective regimen sliding scale   SubCutaneous every 4 hours  memantine 10 milliGRAM(s) Oral two times a day  QUEtiapine 25 milliGRAM(s) Oral at bedtime  sodium chloride 0.9%. 1000 milliLiter(s) (50 mL/Hr) IV Continuous <Continuous>  sotalol. 40 milliGRAM(s) Oral every 12 hours    MEDICATIONS  (PRN):  acetaminophen     Tablet .. 650 milliGRAM(s) Oral every 6 hours PRN Temp greater or equal to 38.5C (101.3F), Mild Pain (1 - 3)      Allergies    No Known Allergies    Intolerances      Review of Systems:  Unable to assess ROS d/t AMS.    ===================PHYSICAL EXAM=======================  VITALS: T(C): 36.3 (10-26-24 @ 12:13)  T(F): 97.3 (10-26-24 @ 12:13), Max: 98.5 (10-26-24 @ 00:20)  HR: 66 (10-26-24 @ 12:13) (65 - 84)  BP: 128/76 (10-26-24 @ 12:13) (117/76 - 160/83)  RR:  (16 - 20)  SpO2:  (98% - 100%)  Wt(kg): --  GENERAL: NAD  EYES: No proptosis, no lid lag, anicteric  THYROID: Normal size, no palpable nodules  RESPIRATORY: Clear to auscultation bilaterally  CARDIOVASCULAR: Regular rate and rhythm  GI: Soft, nontender, non distended  EXT: b/l feet without wounds; 2+ pulses  PSYCH: Alert and oriented x 0, reactive mood  ======================================================  POCT Blood Glucose.: 380 mg/dL (10-26-24 @ 13:02)  POCT Blood Glucose.: 372 mg/dL (10-26-24 @ 10:56)  POCT Blood Glucose.: 437 mg/dL (10-26-24 @ 08:54)  POCT Blood Glucose.: 594 mg/dL (10-25-24 @ 23:49)  POCT Blood Glucose.: >600 mg/dL (10-25-24 @ 23:48)                            14.4   12.02 )-----------( 206      ( 26 Oct 2024 00:39 )             46.7       10-26    143  |  108  |  61[H]  ----------------------------<  540[HH]  4.4   |  22  |  1.09    eGFR: 51[L]    Ca    9.5      10-26  Mg     3.0     10-26  Phos  4.4     10-26    TPro  6.9  /  Alb  3.4  /  TBili  0.5  /  DBili  x   /  AST  19  /  ALT  23  /  AlkPhos  113  10-26      Thyroid Function Tests:      A1C with Estimated Average Glucose Result: 12.4 % (10-26-24 @ 00:39)          Radiology:

## 2024-10-26 NOTE — H&P ADULT - NSICDXPASTMEDICALHX_GEN_ALL_CORE_FT
PAST MEDICAL HISTORY:  Atrial fibrillation     Dementia     Diabetes mellitus, type II     HTN (hypertension)     Hypercholesteremia     Stenosis of carotid artery, bilateral

## 2024-10-26 NOTE — H&P ADULT - ASSESSMENT
84 f with    Diabetes Mellitus poorly controlled  - BS control  - ADA diet   - Endocrine evaluation called    UTI  - UV  - Ceftriaxone    Dehydration  - gentle IVF    HTN  - control    Decubitus  - wound care  - turn    functional quadriplegia   - supportive care     Dementia  - TSH, B12  - CT head  - Bedbound    DVT prophylaxis    Further action as per clinical course     d/w  family at bedside    Noe Lacey MD phone [phone]    84 f with    Diabetes Mellitus poorly controlled  - BS control  - ADA diet   - Endocrine evaluation called    UTI  - UV  - Ceftriaxone    Dehydration  - gentle IVF    HTN  - control    Decubitus  - wound care  - turn    functional quadriplegia   - supportive care     Dementia  - TSH, B12  - CT head  - Bedbound    Hx of A Fib  - now in SR  - No AC per PMD   - rate control with Sotalol    DVT prophylaxis    Further action as per clinical course     d/w  family at bedside    Noe Lacey MD phone [phone]

## 2024-10-26 NOTE — H&P ADULT - HISTORY OF PRESENT ILLNESS
84 year old female with past medical history of diabetes, dementia baseline A&Ox1, and hypertension who presents to the emergency department after son measured glucose at home as "high" on glucose monitor. Patient unable to provide history secondary to dementia. History provided by son. Son states patient had pointed at her head, but is unsure if that means she had a headache, but otherwise he had not noticed any other symptoms including vomiting, diarrhea, fevers, or chills. Son states that after glucose was measured as high, he called patient's PCP who said to take 2 more glipizide which he gave about 2-3 hours prior to arrival for a total fo 15mg glipizide today.

## 2024-10-26 NOTE — ED ADULT NURSE NOTE - AVIAN FLU SYMPTOMS
Patient present for routine immunizations. Pt denies any symptoms , reactions or allergies that would exclude them from being immunized today. Risks and adverse reactions were discussed and the VIS was given to them. All questions were addressed. Pt was observed for 10 min post injection. There were no reactions observed.     Manolo Fajardo LPN Unable to Assess

## 2024-10-26 NOTE — PATIENT PROFILE ADULT - NSPROPRESSUREINJURY03_GEN_A_NUR
----- Message from Lorraine Lewis sent at 1/14/2019  1:45 PM CST -----  Contact: Mother/ [phone]  Patient would like a call back to ask questions if the patient information to see the doctor was faxed over.    suspected deep tissue injury

## 2024-10-26 NOTE — ED PROVIDER NOTE - CARE PLAN
1 Principal Discharge DX:	Acute UTI   Principal Discharge DX:	Acute UTI  Secondary Diagnosis:	Uncontrolled diabetes mellitus with hyperglycemia

## 2024-10-26 NOTE — ED PROVIDER NOTE - ATTENDING CONTRIBUTION TO CARE
84-year-old female here with elevated blood sugars ongoing for the past 3 weeks but noted to be in the 5-600 range today.  Saw her primary care doctor who told her to take an extra 10 mg of glipizide.  Patient takes 5 mg of glipizide once a day.  Patient w/ dec PO. Hasnt had BM in few days. No urinary sxs.,  Reported.  No weight gain.  No leg swelling.  Patient unable to give further history given dementia awake alert oriented x 0.  History further complicated by high blood pressure and high cholesterol.  Not on insulin.    Hemodynamically stable no acute distress awake alert oriented x 0.  Appears dehydrated.  Dry mucous membranes.  Cap refill 3 seconds.  Clear lungs no increased work of breathing regular rate rhythm.  Abdomen feels distended however no reproducible grimace when palpated.  No bruising.  No leg swelling.  Nontender extremities.  Patient feels stiff however not rigid.  No clonus.  2+ distal pulses.    Eval for DKA versus HHS.  Check for metabolic derangement.  Check for occult infectious process.  Patient without BM in a couple of days as well as decreased urination could be in the setting of dehydration.  Will pend lab work before obtaining advanced imaging.  Summary of presentation, physical exam findings, plan, expected turn around time for diagnostics discussed with patient. Agrees.

## 2024-10-26 NOTE — CONSULT NOTE ADULT - ASSESSMENT
84F hx T2DM, dementia baseline A&Ox1, and hypertension presents for high glucose levels on glucometer and admitted for a UTI.   Endocrine consulted for hyperglycemia (600s, BHB 0.2).    #T2DM   --A1c: 12.4%   --Outpatient regimen: Glipizide 5mg daily - PCP said to take 2 more glipizide which son gave about 2-3 hours prior to arrival for a total of 15mg glipizide before going to ED.   --Endocrinologist: Dr. Sophia Wilson – last seen in 2016     INPATIENT RECOMMENDATIONS:  - s/p Lantus 10 units 10/26/24 at 11:11am  - start Lantus 10 units daily at 11am  - please make NPO  - low admelog correctional scale q4h with FS q4h  - please give fluids if patient is hypovolemic  - c/w lowadmelog correctional scale q4h + FS q4h + NPO until FS <300, after which can patient can start on consistent carb diet with Admelog 3 units TID premeal (if she is eating >50% of her meal, HOLD IF NPO) and change the correctional scale to premeal and at bedtime  - Registered Dietician consult placed     DISCHARGE RECOMMENDATIONS:  - Basal-bolus regimen, doses TBD.   - Pt will need perscription for basal insulin pen (ie. Lantus Solostar pen, if not covered then ask pharmacy which brand of basal insulin is covered by their insurance plan - other brands include: Basaglar, Lantus, Tresiba, Toujeo). Dispense 5 insulin pens with 3 refills.   - Please send prescriptions for diabetes supplies (glucometer, test strips, lancets, alcohol swabs, insulin pen needles (BD gianna 4mm)) - dispense #100, use as directed (3 refills).  - OUT-PATIENT FOLLOW UP: Patient should follow up with Endocrinologist Dr. Sophia Wilson MD or can follow up with our endocriology office [phone]) at [address] [place] [address] [address], [place] [address] if more convenient    #HLD  - patient on atorvastatin 40mg at home  - goal LDL in diabetes mellitus is <70    #HTN  - patient on enalapril 10mg at home  - goal BP in diabetes mellitus is <130/80  - defer to primary team for management      Discussed recommendations with primary team.    Antonio Lobo MD  Endocrine Fellow  Can be reached via [employer] teams.    For follow up questions, discharge recommendations, or new consults, please email [email or web address] ([email or web address]) or [email or web address] ([facility]) or call answering service at [phone] (weekdays); [phone] (nights/weekends).  For emergencies please page fellow on call.      84F hx T2DM, dementia baseline A&Ox1, and hypertension presents for high glucose levels on glucometer and admitted for a UTI.   Endocrine consulted for hyperglycemia (600s, BHB 0.2).    #T2DM   --A1c: 12.4%   --Outpatient regimen: Glipizide 5mg daily - PCP said to take 2 more glipizide which son gave about 2-3 hours prior to arrival for a total of 15mg glipizide before going to ED.   --Endocrinologist: Dr. Sophia Wilson – last seen in 2016     INPATIENT RECOMMENDATIONS:  - s/p Lantus 10 units 10/26/24 at 11:11am  - start Lantus 10 units daily at 11am  - please make NPO  - low admelog correctional scale q4h with FS q4h  - please give fluids if patient is hypovolemic  - c/w low admelog correctional scale q4h + FS q4h + NPO until FS <300, after which can patient can start on consistent carb diet with Admelog 3 units TID premeal (if she is eating >50% of her meal, HOLD IF NPO) and change the correctional scale to premeal and at bedtime  - check C-peptide tomorrow AM  - Registered Dietician consult placed     DISCHARGE RECOMMENDATIONS:  - Basal-bolus regimen, doses TBD.   - Pt will need perscription for basal insulin pen (ie. Lantus Solostar pen, if not covered then ask pharmacy which brand of basal insulin is covered by their insurance plan - other brands include: Basaglar, Lantus, Tresiba, Toujeo). Dispense 5 insulin pens with 3 refills.   - Please send prescriptions for diabetes supplies (glucometer, test strips, lancets, alcohol swabs, insulin pen needles (BD gianna 4mm)) - dispense #100, use as directed (3 refills).  - OUT-PATIENT FOLLOW UP: Patient should follow up with Endocrinologist Dr. Sophia Wilson MD or can follow up with our endocriology office [phone]) at [address] [place] [address] [address] [place] [id number] if more convenient    #HLD  - patient on atorvastatin 40mg at home  - goal LDL in diabetes mellitus is <70    #HTN  - patient on enalapril 10mg at home  - goal BP in diabetes mellitus is <130/80  - defer to primary team for management      Discussed recommendations with primary team.    Antonio Lobo MD  Endocrine Fellow  Can be reached via [employer] teams.    For follow up questions, discharge recommendations, or new consults, please email [email or web address] ([email or web address]) or [email or web address] ([facility]) or call answering service at [phone] (weekdays); [phone] (nights/weekends).  For emergencies please page fellow on call.

## 2024-10-26 NOTE — ED PROVIDER NOTE - PHYSICAL EXAMINATION
CONSTITUTIONAL: NAD   HEAD: Normocephalic; atraumatic  EYES: EOMI, PERRL, no conjunctival injection, no scleral icterus  MOUTH/THROAT:  MMM  NECK: Trachea midline  CV: Normal S1, S2; no audible murmurs  RESP: normal work of breathing; CTAB   ABD: soft, non-distended; questionable tenderness to palpation of abdomen, difficult to assess secondary to patient's dementia  MSK/EXT: no LE edema, no limited ROM  SKIN: No rashes on exposed skin surfaces including back and bilateral arms  NEURO: Moves all extremities spontaneously with no apparent focal deficits, speech does not appear slurred, A&Ox0  PSYCH: well kempt, calm

## 2024-10-26 NOTE — H&P ADULT - NSHPSOCIALHISTORY_GEN_ALL_CORE
Social History:    Marital Status:  (   )    (   ) Single    (   )    (x  )   Occupation:   Lives with: (  ) alone  ( x ) children   (  ) spouse   (  ) parents  (  ) other    Substance Use (street drugs): (x ) never used  (  ) other:  Tobacco Usage:  (x  ) never smoked   (   ) former smoker   (   ) current smoker  (     ) pack years  (        ) last cigarette date  Alcohol Usage: no    (     ) Advanced Directives: (     ) None    (      ) DNR    (     ) DNI    (     ) Health Care Proxy:

## 2024-10-26 NOTE — ED ADULT NURSE NOTE - OBJECTIVE STATEMENT
84 y.o F A&OX1, [place] Speaking w. PMH of HTN, DM presents to ED complaining of high blood sugars. Pt BIBEMS from home after having high sugars "for about 3 weeks." Per son at bedside, pt has been having high sugars despite taking her DM medication- Glipizide. Pt is noted 84 y.o F A&Ox0, [place] Speaking w. PMH of HTN, DM presents to ED complaining of high blood sugars. Pt BIBEMS from home after having high sugars "for about 3 weeks." Per son at bedside, pt has been having high sugars despite taking her DM medication- Glipizide.  MD told pt to take an extra 10mg of Glipizide. Of note, pt is not on Insulin. Son at bedside states that pt has been bedbound recently for the last 3 weeks, but typically is more independent at her baseline. Pt is otherwise well appearing and in no acute distress. Respirations are even, spontaneous and unlabored, pt is hemodynamically stable. VSS at this time. Son at bedside for collateral.

## 2024-10-26 NOTE — H&P ADULT - NSHPPHYSICALEXAM_GEN_ALL_CORE
PHYSICAL EXAMINATION:  Vital Signs Last 24 Hrs  T(C): 36.9 (26 Oct 2024 08:26), Max: 36.9 (26 Oct 2024 00:20)  T(F): 98.4 (26 Oct 2024 08:26), Max: 98.5 (26 Oct 2024 00:20)  HR: 65 (26 Oct 2024 08:26) (65 - 84)  BP: 139/79 (26 Oct 2024 08:26) (117/76 - 160/83)  BP(mean): 78 (26 Oct 2024 04:40) (78 - 78)  RR: 16 (26 Oct 2024 08:26) (16 - 20)  SpO2: 99% (26 Oct 2024 04:40) (98% - 100%)    Parameters below as of 26 Oct 2024 04:40  Patient On (Oxygen Delivery Method): room air      CAPILLARY BLOOD GLUCOSE      POCT Blood Glucose.: 437 mg/dL (26 Oct 2024 08:54)  POCT Blood Glucose.: 594 mg/dL (25 Oct 2024 23:49)  POCT Blood Glucose.: >600 mg/dL (25 Oct 2024 23:48)      GENERAL: NAD   HEAD:  atraumatic, normocephalic  EYES: sclera anicteric  ENMT: mucous membranes moist  NECK: supple, No JVD  CHEST/LUNG: clear to auscultation bilaterally; no rales, rhonchi, or wheezing b/l  HEART: normal S1, S2  ABDOMEN: BS+, soft, ND, NT   EXTREMITIES:  pulses palpable; no clubbing, cyanosis, or edema b/l LEs  NEURO: awake, alert, interactive; moves all extremities AOX1  SKIN: sacral decubitus

## 2024-10-26 NOTE — ED PROVIDER NOTE - EKG/XRAY ADDITIONAL INFORMATION
Hu GONZALEZ (ED Attending), independently interpreted the EKG:  Interpreted at: 12:53 AM  Normal sinus rhythm rate 64  QRS 84 QTc 476  No diagnostic ischemic ekg changes.

## 2024-10-26 NOTE — CONSULT NOTE ADULT - ATTENDING COMMENTS
84F hx T2DM, dementia baseline A&Ox1, and hypertension presents for high glucose levels on glucometer and admitted for a UTI.   Endocrine consulted for hyperglycemia (600s, BHB 0.2).  Agree with plan as noted above to start lantus 10 units daily. Can keep NPO until sugars improve.   Will continue to follow along and make insulin recommendations based on inpatient blood sugar trend.

## 2024-10-26 NOTE — ED PROVIDER NOTE - CLINICAL SUMMARY MEDICAL DECISION MAKING FREE TEXT BOX
Patient is an 84 year old female with past medical history of diabetes, dementia baseline A&Ox1, and hypertension who presents to the emergency department after son measured glucose at home as "high" on glucose monitor.    Concern for DKA vs HHS vs infection. Will obtain labs, urine, chest x-ray, EKG, reassess.

## 2024-10-26 NOTE — H&P ADULT - NSHPLABSRESULTS_GEN_ALL_CORE
14.4   12.02 )-----------( 206      ( 26 Oct 2024 00:39 )             46.7       10-26    143  |  108  |  61[H]  ----------------------------<  540[HH]  4.4   |  22  |  1.09    Ca    9.5      26 Oct 2024 04:24  Phos  4.4     10-26  Mg     3.0     10-26    TPro  6.9  /  Alb  3.4  /  TBili  0.5  /  DBili  x   /  AST  19  /  ALT  23  /  AlkPhos  113  10-26              Urinalysis Basic - ( 26 Oct 2024 04:24 )    Color: x / Appearance: x / SG: x / pH: x  Gluc: 540 mg/dL / Ketone: x  / Bili: x / Urobili: x   Blood: x / Protein: x / Nitrite: x   Leuk Esterase: x / RBC: x / WBC x   Sq Epi: x / Non Sq Epi: x / Bacteria: x    POCT Blood Glucose.: 437 mg/dL (26 Oct 2024 08:54)    < from: Xray Chest 1 View- PORTABLE-Urgent (10.26.24 @ 01:45) >      IMPRESSION:  No focal consolidation.    < end of copied text >    EKG SR

## 2024-10-27 LAB
ANION GAP SERPL CALC-SCNC: 12 MMOL/L — SIGNIFICANT CHANGE UP (ref 5–17)
BUN SERPL-MCNC: 42 MG/DL — HIGH (ref 7–23)
C PEPTIDE SERPL-MCNC: 1.4 NG/ML — SIGNIFICANT CHANGE UP (ref 1.1–4.4)
C PEPTIDE SERPL-MCNC: 2.4 NG/ML — SIGNIFICANT CHANGE UP (ref 1.1–4.4)
CALCIUM SERPL-MCNC: 9 MG/DL — SIGNIFICANT CHANGE UP (ref 8.4–10.5)
CHLORIDE SERPL-SCNC: 113 MMOL/L — HIGH (ref 96–108)
CO2 SERPL-SCNC: 21 MMOL/L — LOW (ref 22–31)
CREAT SERPL-MCNC: 0.91 MG/DL — SIGNIFICANT CHANGE UP (ref 0.5–1.3)
EGFR: 63 ML/MIN/1.73M2 — SIGNIFICANT CHANGE UP
GLUCOSE BLDC GLUCOMTR-MCNC: 104 MG/DL — HIGH (ref 70–99)
GLUCOSE BLDC GLUCOMTR-MCNC: 117 MG/DL — HIGH (ref 70–99)
GLUCOSE BLDC GLUCOMTR-MCNC: 118 MG/DL — HIGH (ref 70–99)
GLUCOSE BLDC GLUCOMTR-MCNC: 137 MG/DL — HIGH (ref 70–99)
GLUCOSE BLDC GLUCOMTR-MCNC: 194 MG/DL — HIGH (ref 70–99)
GLUCOSE BLDC GLUCOMTR-MCNC: 202 MG/DL — HIGH (ref 70–99)
GLUCOSE BLDC GLUCOMTR-MCNC: 237 MG/DL — HIGH (ref 70–99)
GLUCOSE BLDC GLUCOMTR-MCNC: 262 MG/DL — HIGH (ref 70–99)
GLUCOSE BLDC GLUCOMTR-MCNC: 273 MG/DL — HIGH (ref 70–99)
GLUCOSE SERPL-MCNC: 129 MG/DL — HIGH (ref 70–99)
HCT VFR BLD CALC: 39.4 % — SIGNIFICANT CHANGE UP (ref 34.5–45)
HGB BLD-MCNC: 12.3 G/DL — SIGNIFICANT CHANGE UP (ref 11.5–15.5)
MCHC RBC-ENTMCNC: 29.2 PG — SIGNIFICANT CHANGE UP (ref 27–34)
MCHC RBC-ENTMCNC: 31.2 GM/DL — LOW (ref 32–36)
MCV RBC AUTO: 93.6 FL — SIGNIFICANT CHANGE UP (ref 80–100)
NRBC # BLD: 0 /100 WBCS — SIGNIFICANT CHANGE UP (ref 0–0)
PLATELET # BLD AUTO: 156 K/UL — SIGNIFICANT CHANGE UP (ref 150–400)
POTASSIUM SERPL-MCNC: 3.6 MMOL/L — SIGNIFICANT CHANGE UP (ref 3.5–5.3)
POTASSIUM SERPL-SCNC: 3.6 MMOL/L — SIGNIFICANT CHANGE UP (ref 3.5–5.3)
RBC # BLD: 4.21 M/UL — SIGNIFICANT CHANGE UP (ref 3.8–5.2)
RBC # FLD: 13 % — SIGNIFICANT CHANGE UP (ref 10.3–14.5)
SODIUM SERPL-SCNC: 146 MMOL/L — HIGH (ref 135–145)
TSH SERPL-MCNC: 0.8 UIU/ML — SIGNIFICANT CHANGE UP (ref 0.27–4.2)
VIT B12 SERPL-MCNC: 1642 PG/ML — HIGH (ref 232–1245)
WBC # BLD: 9.57 K/UL — SIGNIFICANT CHANGE UP (ref 3.8–10.5)
WBC # FLD AUTO: 9.57 K/UL — SIGNIFICANT CHANGE UP (ref 3.8–10.5)

## 2024-10-27 PROCEDURE — 99232 SBSQ HOSP IP/OBS MODERATE 35: CPT

## 2024-10-27 RX ORDER — INSULIN LISPRO 100/ML
VIAL (ML) SUBCUTANEOUS
Refills: 0 | Status: DISCONTINUED | OUTPATIENT
Start: 2024-10-27 | End: 2024-10-30

## 2024-10-27 RX ORDER — INSULIN GLARGINE,HUM.REC.ANLOG 100/ML
10 VIAL (ML) SUBCUTANEOUS AT BEDTIME
Refills: 0 | Status: DISCONTINUED | OUTPATIENT
Start: 2024-10-27 | End: 2024-10-29

## 2024-10-27 RX ORDER — BENZOCAINE .06; .7 ML/1; ML/1
0 SWAB TOPICAL
Qty: 100 | Refills: 1
Start: 2024-10-27

## 2024-10-27 RX ADMIN — Medication 40 MILLIGRAM(S): at 17:30

## 2024-10-27 RX ADMIN — Medication 10 UNIT(S): at 21:26

## 2024-10-27 RX ADMIN — MEMANTINE HYDROCHLORIDE 10 MILLIGRAM(S): 21 CAPSULE, EXTENDED RELEASE ORAL at 17:30

## 2024-10-27 RX ADMIN — MEMANTINE HYDROCHLORIDE 10 MILLIGRAM(S): 21 CAPSULE, EXTENDED RELEASE ORAL at 05:38

## 2024-10-27 RX ADMIN — ESCITALOPRAM 10 MILLIGRAM(S): 10 TABLET, FILM COATED ORAL at 12:01

## 2024-10-27 RX ADMIN — Medication 2: at 17:31

## 2024-10-27 RX ADMIN — Medication 2: at 12:02

## 2024-10-27 RX ADMIN — Medication 325 MILLIGRAM(S): at 12:02

## 2024-10-27 RX ADMIN — QUETIAPINE FUMARATE 25 MILLIGRAM(S): 200 TABLET ORAL at 21:23

## 2024-10-27 RX ADMIN — Medication 40 MILLIGRAM(S): at 21:23

## 2024-10-27 RX ADMIN — Medication 40 MILLIGRAM(S): at 05:38

## 2024-10-27 RX ADMIN — HEPARIN SODIUM 5000 UNIT(S): 10000 INJECTION INTRAVENOUS; SUBCUTANEOUS at 05:37

## 2024-10-27 RX ADMIN — Medication 100 MILLIGRAM(S): at 12:01

## 2024-10-27 RX ADMIN — HEPARIN SODIUM 5000 UNIT(S): 10000 INJECTION INTRAVENOUS; SUBCUTANEOUS at 21:24

## 2024-10-27 RX ADMIN — HEPARIN SODIUM 5000 UNIT(S): 10000 INJECTION INTRAVENOUS; SUBCUTANEOUS at 13:23

## 2024-10-27 RX ADMIN — Medication 10 MILLIGRAM(S): at 05:38

## 2024-10-27 NOTE — PROVIDER CONTACT NOTE (MEDICATION) - SITUATION
pt given double dose of lantus yesterday 10/26
pt /63, has enalapril 10 mg and sotalol 40mg due right now

## 2024-10-27 NOTE — PROVIDER CONTACT NOTE (MEDICATION) - REASON
pt /63, has enalapril 10 mg and sotalol 40mg due right now
pt given double dose of lantus yesterday 10/26

## 2024-10-27 NOTE — SWALLOW BEDSIDE ASSESSMENT ADULT - SLP GENERAL OBSERVATIONS
Pt encountered in bed, asleep but arousable for purposes of evaluation, nonverbal, confused, on room air. Daughter at bedside who provided collateral information. Pt unable to follow commands. Pt at baseline mental status as per daughter. Pt is Sami speaking, daughter available to translate as needed.

## 2024-10-27 NOTE — SWALLOW BEDSIDE ASSESSMENT ADULT - SWALLOW EVAL: RECOMMENDED FEEDING/EATING TECHNIQUES
feed only when awake/alert/accepting/allow for swallow between intakes/crush medication (when feasible)/maintain upright posture during/after eating for 30 mins/position upright (90 degrees)/small sips/bites
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
## 2024-10-27 NOTE — SWALLOW BEDSIDE ASSESSMENT ADULT - COMMENTS
Endocrine consulted for hyperglycemia. "Agree with plan as noted above to start lantus 10 units daily. Can keep NPO until sugars improve." Registered Dietician consult placed.    Imaging:  10/26: CXR: No focal consolidation.  10/26: CTH: No evidence of acute intracranial abnormality. No evidence of hemorrhage.    **Not previously known to this service.

## 2024-10-27 NOTE — SWALLOW BEDSIDE ASSESSMENT ADULT - ASR SWALLOW DENTITION
sparse dentition on mandible per daughter; Pt has dentures at home (however, daughter reports they are illfitting and pt does not wear them anymore).

## 2024-10-27 NOTE — SWALLOW BEDSIDE ASSESSMENT ADULT - SWALLOW EVAL: PATIENT/FAMILY GOALS STATEMENT
Per daughter at bedside, pt consumes diet consisting primarily of puree foods given dentition status. Daughter reports pt w/ no swallowing difficulty. Per daughter at bedside, pt consumes diet consisting primarily of pureed, "blended" foods given dentition status. Daughter reports pt w/ no swallowing difficulty.

## 2024-10-27 NOTE — SWALLOW BEDSIDE ASSESSMENT ADULT - PHARYNGEAL PHASE
Slight audibility to swallows; No overt s/s of laryngeal penetration/aspiration across trials. No overt s/s of laryngeal penetration/aspiration across trials./Delayed pharyngeal swallow

## 2024-10-27 NOTE — SWALLOW BEDSIDE ASSESSMENT ADULT - SWALLOW EVAL: DIAGNOSIS
84yoF w/ PMHx of T2DM, dementia, p/w high glucose levels, admitted for UTI, CXR clear, CTH (-). Pt p/w an oral dysphagia c/w known hx of dementia and in setting of incomplete dentition. Swallow sequence notable for slightly reduced oral grading w/ improved bolus stripping as trials progressed, prolonged and inefficient mastication and oral transfer of minced and moist solid, and delayed oral transit time vs delay in pharyngeal swallow trigger. Hyolaryngeal elevation/excursion suspected to be adequate upon palpation. No overt s/s of laryngeal penetration/aspiration observed across trials. Pt tolerating baseline diet of puree/thin liquids w/o current clinical c/f aspiration; therefore, this service will no longer actively follow while in house. NP Bebe Jefferson made aware and in agreement.

## 2024-10-27 NOTE — PROGRESS NOTE ADULT - PROBLEM SELECTOR PLAN 1
Inpatient Plan:  - Check BGs TID AC, HS, and 2AM while on PO diet or q6h if NPO  - Please continue to check BG q1h until 1300 to make sure BGs stable and allow patient to eat extra food today  - Adjust Lantus to 10u QHS   - C/w low dose Admelog correctional scales TID AC, HS, and 2AM  - Check C-peptide tomorrow AM  - RN to provide family education regarding insulin pen injections prior to discharge    Discharge Planning:  - Basal-bolus vs basal plus orals (doses TBD closer to d/c)  - Patient/family will need home care upon discharge due to new need for home insulin therapy.   - OUT-PATIENT FOLLOW UP: Patient should follow up with Endocrinologist Dr. Sophia Wilson MD or can follow up with our Endocrinology office [phone]) at [address] [place] [address] [address] [place] [phone] if more convenient.   - Recommend routine Opthalmology and Podiatry follow up.     Pt will need RX for basal insulin pen (ie. Basaglar, Lantus, Tresiba, Toujeo, Levemir) and bolus insulin pen (ie. humalog/novolog/admelog) depending on insurance coverage; please send test scripts to see which is covered. Please send prescriptions for diabetes supplies (glucometer, test strips, lancets, alcohol swabs, insulin pen needles).

## 2024-10-27 NOTE — PROVIDER CONTACT NOTE (MEDICATION) - BACKGROUND
pt admitted for hyperglycemia and UTI. PMH: HLD, HTN,
pt admitted for hyperglycemia and UTI. PMH: HLD, HTN,

## 2024-10-27 NOTE — PROVIDER CONTACT NOTE (MEDICATION) - ACTION/TREATMENT ORDERED:
As per provider, FS q 1 hour until 1pm, pt may eat whatever she likes
As per provider, ok to give sotalol, hold enalapril

## 2024-10-27 NOTE — SWALLOW BEDSIDE ASSESSMENT ADULT - MODE OF PRESENTATION
fed by daughter via sips from water bottle (Per daughter, pt does not drink with straw at home)./cup spoon/fed by clinician

## 2024-10-27 NOTE — PROVIDER CONTACT NOTE (MEDICATION) - ASSESSMENT
Pt is A&Ox0 at baseline. VS as charted. No s/s of pain or discomfort. 
Pt is A&Ox0 at baseline. VS as charted. No s/s of pain or discomfort.

## 2024-10-27 NOTE — SWALLOW BEDSIDE ASSESSMENT ADULT - ORAL PHASE
Inefficient mastication and transfer of minced and moist texture iso dentition status, improved with puree./Delayed oral transit time Within functional limits

## 2024-10-28 LAB
ANION GAP SERPL CALC-SCNC: 9 MMOL/L — SIGNIFICANT CHANGE UP (ref 5–17)
BUN SERPL-MCNC: 37 MG/DL — HIGH (ref 7–23)
CALCIUM SERPL-MCNC: 9 MG/DL — SIGNIFICANT CHANGE UP (ref 8.4–10.5)
CHLORIDE SERPL-SCNC: 114 MMOL/L — HIGH (ref 96–108)
CO2 SERPL-SCNC: 24 MMOL/L — SIGNIFICANT CHANGE UP (ref 22–31)
CREAT SERPL-MCNC: 1 MG/DL — SIGNIFICANT CHANGE UP (ref 0.5–1.3)
EGFR: 56 ML/MIN/1.73M2 — LOW
GLUCOSE BLDC GLUCOMTR-MCNC: 108 MG/DL — HIGH (ref 70–99)
GLUCOSE BLDC GLUCOMTR-MCNC: 129 MG/DL — HIGH (ref 70–99)
GLUCOSE BLDC GLUCOMTR-MCNC: 164 MG/DL — HIGH (ref 70–99)
GLUCOSE BLDC GLUCOMTR-MCNC: 190 MG/DL — HIGH (ref 70–99)
GLUCOSE BLDC GLUCOMTR-MCNC: 276 MG/DL — HIGH (ref 70–99)
GLUCOSE BLDC GLUCOMTR-MCNC: 89 MG/DL — SIGNIFICANT CHANGE UP (ref 70–99)
GLUCOSE SERPL-MCNC: 120 MG/DL — HIGH (ref 70–99)
HCT VFR BLD CALC: 41.6 % — SIGNIFICANT CHANGE UP (ref 34.5–45)
HGB BLD-MCNC: 12.8 G/DL — SIGNIFICANT CHANGE UP (ref 11.5–15.5)
MCHC RBC-ENTMCNC: 29.4 PG — SIGNIFICANT CHANGE UP (ref 27–34)
MCHC RBC-ENTMCNC: 30.8 GM/DL — LOW (ref 32–36)
MCV RBC AUTO: 95.6 FL — SIGNIFICANT CHANGE UP (ref 80–100)
NRBC # BLD: 0 /100 WBCS — SIGNIFICANT CHANGE UP (ref 0–0)
PLATELET # BLD AUTO: 134 K/UL — LOW (ref 150–400)
POTASSIUM SERPL-MCNC: 3.9 MMOL/L — SIGNIFICANT CHANGE UP (ref 3.5–5.3)
POTASSIUM SERPL-SCNC: 3.9 MMOL/L — SIGNIFICANT CHANGE UP (ref 3.5–5.3)
RBC # BLD: 4.35 M/UL — SIGNIFICANT CHANGE UP (ref 3.8–5.2)
RBC # FLD: 13.1 % — SIGNIFICANT CHANGE UP (ref 10.3–14.5)
SODIUM SERPL-SCNC: 147 MMOL/L — HIGH (ref 135–145)
WBC # BLD: 8.2 K/UL — SIGNIFICANT CHANGE UP (ref 3.8–10.5)
WBC # FLD AUTO: 8.2 K/UL — SIGNIFICANT CHANGE UP (ref 3.8–10.5)

## 2024-10-28 PROCEDURE — 99232 SBSQ HOSP IP/OBS MODERATE 35: CPT

## 2024-10-28 PROCEDURE — 99223 1ST HOSP IP/OBS HIGH 75: CPT

## 2024-10-28 PROCEDURE — 99221 1ST HOSP IP/OBS SF/LOW 40: CPT

## 2024-10-28 RX ORDER — NYSTATIN 100000 U/G
1 POWDER TOPICAL
Refills: 0 | Status: DISCONTINUED | OUTPATIENT
Start: 2024-10-28 | End: 2024-10-30

## 2024-10-28 RX ORDER — B-COMPLEX WITH VITAMIN C
1 VIAL (ML) INJECTION DAILY
Refills: 0 | Status: DISCONTINUED | OUTPATIENT
Start: 2024-10-28 | End: 2024-10-30

## 2024-10-28 RX ORDER — ASCORBIC ACID 500 MG
500 TABLET ORAL DAILY
Refills: 0 | Status: DISCONTINUED | OUTPATIENT
Start: 2024-10-28 | End: 2024-10-30

## 2024-10-28 RX ADMIN — Medication 10 UNIT(S): at 21:29

## 2024-10-28 RX ADMIN — QUETIAPINE FUMARATE 25 MILLIGRAM(S): 200 TABLET ORAL at 21:29

## 2024-10-28 RX ADMIN — Medication 40 MILLIGRAM(S): at 21:29

## 2024-10-28 RX ADMIN — HEPARIN SODIUM 5000 UNIT(S): 10000 INJECTION INTRAVENOUS; SUBCUTANEOUS at 13:03

## 2024-10-28 RX ADMIN — Medication 40 MILLIGRAM(S): at 05:31

## 2024-10-28 RX ADMIN — Medication 40 MILLIGRAM(S): at 17:10

## 2024-10-28 RX ADMIN — NYSTATIN 1 APPLICATION(S): 100000 POWDER TOPICAL at 21:34

## 2024-10-28 RX ADMIN — Medication 10 MILLIGRAM(S): at 05:31

## 2024-10-28 RX ADMIN — MEMANTINE HYDROCHLORIDE 10 MILLIGRAM(S): 21 CAPSULE, EXTENDED RELEASE ORAL at 05:31

## 2024-10-28 RX ADMIN — Medication 3: at 17:10

## 2024-10-28 RX ADMIN — Medication 10 MILLIGRAM(S): at 17:10

## 2024-10-28 RX ADMIN — MEMANTINE HYDROCHLORIDE 10 MILLIGRAM(S): 21 CAPSULE, EXTENDED RELEASE ORAL at 17:10

## 2024-10-28 RX ADMIN — Medication 325 MILLIGRAM(S): at 13:03

## 2024-10-28 RX ADMIN — HEPARIN SODIUM 5000 UNIT(S): 10000 INJECTION INTRAVENOUS; SUBCUTANEOUS at 21:29

## 2024-10-28 RX ADMIN — ESCITALOPRAM 10 MILLIGRAM(S): 10 TABLET, FILM COATED ORAL at 13:03

## 2024-10-28 RX ADMIN — HEPARIN SODIUM 5000 UNIT(S): 10000 INJECTION INTRAVENOUS; SUBCUTANEOUS at 05:31

## 2024-10-28 NOTE — CONSULT NOTE ADULT - ASSESSMENT
Impression:    Sacral/bilateral Buttocks deep tissue injury present on admission  Rash of Torso, unknown etiology  Incontinence of bowel and bladder  Incontinence Dermatitis    Recommend:  1.) topical therapy: mons pubis, perineum sacral/buttock rash - will defer to dermatology  2.) Incontinence Management - incontinence cleanser, pads, pericare BID  3.) Maintain on an alternating air with low air loss surface  4.) Turn and reposition Q 2 hours  5.) Nutrition optimization -  6.) Offload heels/feet with complete cair air fluidized boots/pillows; ensure that the soles of the feet are not resting on the foot board of the bed.  7.) chair cushion for chair sitting  8.) Consider Dermatology Consult for evaluation of Rash    Care as per medicine. Will not actively follow but will remain available. Please recall for new issues or deterioration.  Upon discharge [facility] [address] [place] [address] [phone]  Thank you for this consult  ANEL Glover, CWOCN via TEAMS    Nights/ Weekends/ Holidays please call:  General Surgery Consult pager [phone]) for emergencies

## 2024-10-28 NOTE — DIETITIAN INITIAL EVALUATION ADULT - PERTINENT LABORATORY DATA
10-28    147[H]  |  114[H]  |  37[H]  ----------------------------<  120[H]  3.9   |  24  |  1.00    Ca    9.0      28 Oct 2024 06:48    POCT Blood Glucose.: 129 mg/dL (10-28-24 @ 12:54)  A1C with Estimated Average Glucose Result: 12.4 % (10-26-24 @ 00:39)

## 2024-10-28 NOTE — DIETITIAN INITIAL EVALUATION ADULT - PERTINENT MEDS FT
MEDICATIONS  (STANDING):  aspirin 325 milliGRAM(s) Oral daily  atorvastatin 40 milliGRAM(s) Oral at bedtime  dextrose 5%. 1000 milliLiter(s) (100 mL/Hr) IV Continuous <Continuous>  dextrose 5%. 1000 milliLiter(s) (50 mL/Hr) IV Continuous <Continuous>  dextrose 50% Injectable 12.5 Gram(s) IV Push once  dextrose 50% Injectable 25 Gram(s) IV Push once  dextrose 50% Injectable 25 Gram(s) IV Push once  dextrose Oral Gel 15 Gram(s) Oral once  enalapril 10 milliGRAM(s) Oral two times a day  escitalopram 10 milliGRAM(s) Oral daily  glucagon  Injectable 1 milliGRAM(s) IntraMuscular once  heparin   Injectable 5000 Unit(s) SubCutaneous every 8 hours  insulin glargine Injectable (LANTUS) 10 Unit(s) SubCutaneous at bedtime  insulin lispro (ADMELOG) corrective regimen sliding scale   SubCutaneous three times a day before meals  insulin lispro (ADMELOG) corrective regimen sliding scale   SubCutaneous <User Schedule>  memantine 10 milliGRAM(s) Oral two times a day  QUEtiapine 25 milliGRAM(s) Oral at bedtime  sodium chloride 0.9%. 1000 milliLiter(s) (50 mL/Hr) IV Continuous <Continuous>  sotalol. 40 milliGRAM(s) Oral every 12 hours    MEDICATIONS  (PRN):  acetaminophen     Tablet .. 650 milliGRAM(s) Oral every 6 hours PRN Temp greater or equal to 38.5C (101.3F), Mild Pain (1 - 3)

## 2024-10-28 NOTE — DIETITIAN INITIAL EVALUATION ADULT - ORAL INTAKE PTA/DIET HISTORY
Pt with advanced dementia, spoke  with daughter in-law at bedside. Pt noted with decreased PO intake and appetite x ~ 2 days, at baseline pt with relatively good intake. Consumes soft/pureed diet, such as eggs, pureed vegetable soup. No therapeutic diet restrictions. Drinks water mostly.  NKFA. No reports of difficulty, nausea, vomiting, diarrhea, constipation.

## 2024-10-28 NOTE — DIETITIAN INITIAL EVALUATION ADULT - ENTER TO (CAL/KG)
Group Topic: BH Check-in/Symptom Rating    Date: 10/14/2024  Start Time: 0900  End Time: 0930  Facilitators: Glenis Zazueta    Focus: community group  Number in attendance: 9    Method: Group  Attendance: Present  Participation: Minimal  Patient Response: Attentive  Mood: Anxious  Affect: Type: Anxious  Behavior/Socialization: Engaged  Thought Process: Easily distracted  Task Performance: Needs clarification and Needs cueing  Patient Evaluation: Encouragement - needs prompts  Patient very quite rated 3/10 on  mood scale and said make a call to brother.     32

## 2024-10-28 NOTE — CONSULT NOTE ADULT - SUBJECTIVE AND OBJECTIVE BOX
HPI:   84 year old female with past medical history of diabetes, dementia baseline A&Ox1, and hypertension who presents to the emergency department after son measured glucose at home as "high" on glucose monitor. Patient unable to provide history secondary to dementia. History provided by son. Son states patient had pointed at her head, but is unsure if that means she had a headache, but otherwise he had not noticed any other symptoms including vomiting, diarrhea, fevers, or chills. Son states that after glucose was measured as high, he called patient's PCP who said to take 2 more glipizide which he gave about 2-3 hours prior to arrival for a total fo 15mg glipizide today. (26 Oct 2024 10:49)    Derm consulted for groin rash       PAST MEDICAL & SURGICAL HISTORY:  Stenosis of carotid artery, bilateral      HTN (hypertension)      Hypercholesteremia      Diabetes mellitus, type II      Atrial fibrillation      Dementia      Uncontrolled type 2 diabetes mellitus with hyperglycemia      HTN (hypertension)      HLD (hyperlipidemia)      S/P appendectomy      S/P cataract extraction, right          Review of Systems: ____________________________________  REVIEW OF SYSTEMS  nonverbal    MEDICATIONS  (STANDING):  ascorbic acid 500 milliGRAM(s) Oral daily  aspirin 325 milliGRAM(s) Oral daily  atorvastatin 40 milliGRAM(s) Oral at bedtime  dextrose 5%. 1000 milliLiter(s) IV Continuous <Continuous>  dextrose 5%. 1000 milliLiter(s) IV Continuous <Continuous>  dextrose 50% Injectable 25 Gram(s) IV Push once  dextrose 50% Injectable 25 Gram(s) IV Push once  dextrose 50% Injectable 12.5 Gram(s) IV Push once  dextrose Oral Gel 15 Gram(s) Oral once  enalapril 10 milliGRAM(s) Oral two times a day  escitalopram 10 milliGRAM(s) Oral daily  glucagon  Injectable 1 milliGRAM(s) IntraMuscular once  heparin   Injectable 5000 Unit(s) SubCutaneous every 8 hours  insulin glargine Injectable (LANTUS) 10 Unit(s) SubCutaneous at bedtime  insulin lispro (ADMELOG) corrective regimen sliding scale   SubCutaneous three times a day before meals  insulin lispro (ADMELOG) corrective regimen sliding scale   SubCutaneous <User Schedule>  memantine 10 milliGRAM(s) Oral two times a day  multivitamin 1 Tablet(s) Oral daily  nystatin Powder 1 Application(s) Topical two times a day  QUEtiapine 25 milliGRAM(s) Oral at bedtime  sodium chloride 0.9%. 1000 milliLiter(s) IV Continuous <Continuous>  sotalol. 40 milliGRAM(s) Oral every 12 hours    ALLERGIES: No Known Allergies        SOCIAL HISTORY:  ____________________________________  Social History:  Social History:    Marital Status:  (   )    (   ) Single    (   )    (x  )   Occupation:   Lives with: (  ) alone  ( x ) children   (  ) spouse   (  ) parents  (  ) other    Substance Use (street drugs): (x ) never used  (  ) other:  Tobacco Usage:  (x  ) never smoked   (   ) former smoker   (   ) current smoker  (     ) pack years  (        ) last cigarette date  Alcohol Usage: no    (     ) Advanced Directives: (     ) None    (      ) DNR    (     ) DNI    (     ) Health Care Proxy:  FAMILY HISTORY: ____________________________________  FAMILY HISTORY:  Family history of hypertension    Family history of hypercholesterolemia          VITAL SIGNS LAST 24 HOURS:  T(F): 98.1 (10-28 @ 20:10), Max: 98.2 (10-28 @ 05:17)  HR: 66 (10-28 @ 20:10) (62 - 82)  BP: 109/67 (10-28 @ 20:10) (101/72 - 117/73)  RR: 18 (10-28 @ 20:10) (18 - 18)    ___________________________________  PHYSICAL EXAM:     The patient was alert and oriented X 3, well nourished, and in no  apparent distress.  OP showed no ulcerations  There was no visible lymphadenopathy.  Conjunctiva were non injected  There was no clubbing or edema of extremities.  The scalp, hair, face, eyebrows, lips, OP, neck, chest, back,   extremities X 4, nails were examined.  There was no hyperhidrosis or bromhidrosis.    Of note on skin exam: bright pink erythematous, macerated plaque with satellite lesions in groin    ____________________________________    LABS:                        12.8   8.20  )-----------( 134      ( 28 Oct 2024 06:48 )             41.6     10-28    147[H]  |  114[H]  |  37[H]  ----------------------------<  120[H]  3.9   |  24  |  1.00    Ca    9.0      28 Oct 2024 06:48        Urinalysis Basic - ( 28 Oct 2024 06:48 )    Color: x / Appearance: x / SG: x / pH: x  Gluc: 120 mg/dL / Ketone: x  / Bili: x / Urobili: x   Blood: x / Protein: x / Nitrite: x   Leuk Esterase: x / RBC: x / WBC x   Sq Epi: x / Non Sq Epi: x / Bacteria: x

## 2024-10-28 NOTE — DIETITIAN INITIAL EVALUATION ADULT - PERSON TAUGHT/METHOD
pairing protein with carbohydrates, limiting sugar sweetened beverages/sweets in diet and the importance of consistent eating pattern to help optimize glycemic control. In-depth diet education deferred in setting of advanced age, dementia/daughter in-law/verbal instruction/support person

## 2024-10-28 NOTE — DIETITIAN INITIAL EVALUATION ADULT - REASON FOR ADMISSION
Urinary tract infection    Chart reviewed, events noted. This is a "84 year old female with past medical history of diabetes, dementia baseline A&Ox1, and hypertension who presents to the emergency department after son measured glucose at home as "high" on glucose monitor. "

## 2024-10-28 NOTE — DIETITIAN INITIAL EVALUATION ADULT - ENERGY INTAKE
Adequate (%) In-house pt noted with good PO intake, ate well for breakfast this morning per family. No acute GI distress noted. Family declining nutrition supplements at this time, states pt did try Glucerna in the past but had diarrhea from it. Pt currently eating well in-house, will continue to monitor.

## 2024-10-28 NOTE — DIETITIAN INITIAL EVALUATION ADULT - OTHER INFO
Weight: UBW reports as ~ 135lbs with suspected weight gain per daughter in-law. Weights per chart review 135lbs (10/30/2014), 125lbs (1/18/20). Current dosing weight is 124lbs, ? accuracy for reported UBW, will continue to monitor.     Height per [facility] HIE: 5'3

## 2024-10-28 NOTE — DIETITIAN INITIAL EVALUATION ADULT - ADD RECOMMEND
1) Defer diet texture/consistency to team/ Speech Language Pathologist. Continue carbohydrate consistent diet 2) Encourage PO intake of protein-rich foods. 3) Recommend addition of multivitamin, ascorbic daily to promote wound healing if no contraindications. 4) RD to remain available and follow-up as medically appropriate.

## 2024-10-28 NOTE — DIETITIAN INITIAL EVALUATION ADULT - NSFNSADHERENCEPTAFT_GEN_A_CORE
Pt with T2DM,  Glipizide 5mg daily at home. HbA1c: 12.4% (10/26), Finger sticks x 24hrs; 89-273mg/dL

## 2024-10-28 NOTE — CONSULT NOTE ADULT - SUBJECTIVE AND OBJECTIVE BOX
Wound Surgery Consult Note:    HPI:   84 year old female with past medical history of diabetes, dementia baseline A&Ox1, and hypertension who presents to the emergency department after son measured glucose at home as "high" on glucose monitor. Patient unable to provide history secondary to dementia. History provided by son. Son states patient had pointed at her head, but is unsure if that means she had a headache, but otherwise he had not noticed any other symptoms including vomiting, diarrhea, fevers, or chills. Son states that after glucose was measured as high, he called patient's PCP who said to take 2 more glipizide which he gave about 2-3 hours prior to arrival for a total fo 15mg glipizide today. (26 Oct 2024 10:49)    Request for wound care evaluation of the sacrum and bilateral buttocks received from nursing. Ms. Saleh was encountered sitting up in a recliner chair with her daughter at her side. She has been maintained on an alternating air with low air loss surface. She was seen with her clinical nurse who also pointed out a rash on her mons pubis, perineum and extending to the buttocks/sacrum. The patient's daughter stated that the rash has been present for at least 2 weeks and has not improved with antifungal cream. They have also used medihoney on the sacral area. Discussed recommendation for Dermatology Consult with ACP, Toño Bonilla. She was seen with her clinical nurse.  She is incontinent of stool and urine. Her immobility, inactivity, incontinence of bowel and bladder in addition to poor nutritional status all contribute to her risk of pressure injury development and hinder healing.     PAST MEDICAL & SURGICAL HISTORY:  Stenosis of carotid artery, bilateral  HTN (hypertension)  Hypercholesteremia  Diabetes mellitus, type II  Atrial fibrillation  Dementia  Uncontrolled type 2 diabetes mellitus with hyperglycemia  HTN (hypertension)  HLD (hyperlipidemia)  S/P appendectomy  S/P cataract extraction, right    REVIEW OF SYSTEMS  Unable to obtain due to patient's mental status    MEDICATIONS  (STANDING):  aspirin 325 milliGRAM(s) Oral daily  atorvastatin 40 milliGRAM(s) Oral at bedtime  dextrose 5%. 1000 milliLiter(s) (50 mL/Hr) IV Continuous <Continuous>  dextrose 5%. 1000 milliLiter(s) (100 mL/Hr) IV Continuous <Continuous>  dextrose 50% Injectable 25 Gram(s) IV Push once  dextrose 50% Injectable 25 Gram(s) IV Push once  dextrose 50% Injectable 12.5 Gram(s) IV Push once  dextrose Oral Gel 15 Gram(s) Oral once  enalapril 10 milliGRAM(s) Oral two times a day  escitalopram 10 milliGRAM(s) Oral daily  glucagon  Injectable 1 milliGRAM(s) IntraMuscular once  heparin   Injectable 5000 Unit(s) SubCutaneous every 8 hours  insulin glargine Injectable (LANTUS) 10 Unit(s) SubCutaneous at bedtime  insulin lispro (ADMELOG) corrective regimen sliding scale   SubCutaneous three times a day before meals  insulin lispro (ADMELOG) corrective regimen sliding scale   SubCutaneous <User Schedule>  memantine 10 milliGRAM(s) Oral two times a day  QUEtiapine 25 milliGRAM(s) Oral at bedtime  sodium chloride 0.9%. 1000 milliLiter(s) (50 mL/Hr) IV Continuous <Continuous>  sotalol. 40 milliGRAM(s) Oral every 12 hours    MEDICATIONS  (PRN):  acetaminophen     Tablet .. 650 milliGRAM(s) Oral every 6 hours PRN Temp greater or equal to 38.5C (101.3F), Mild Pain (1 - 3)    Allergies    No Known Allergies    Intolerances    SOCIAL HISTORY:  Unable to obtain due to patient's mental status    FAMILY HISTORY:  Family history of hypertension  Family history of hypercholesterolemia    Vital Signs Last 24 Hrs  T(C): 36.8 (28 Oct 2024 05:17), Max: 37.2 (27 Oct 2024 13:04)  T(F): 98.2 (28 Oct 2024 05:17), Max: 98.9 (27 Oct 2024 13:04)  HR: 82 (28 Oct 2024 05:17) (66 - 93)  BP: 101/72 (28 Oct 2024 05:17) (92/58 - 109/70)  BP(mean): --  RR: 18 (28 Oct 2024 05:17) (18 - 18)  SpO2: 99% (28 Oct 2024 05:17) (98% - 99%)    Parameters below as of 27 Oct 2024 13:04  Patient On (Oxygen Delivery Method): room air    Physical Exam:  General: alert, confused  Ophthamology: sclera clear  ENMT: moist mucous membranes, trachea midline  Respiratory: equal chest rise with respirations  Gastrointestinal: soft NT/ND  Neurology: minimally verbal, not following commands  Psych: calm, cooperative  Musculoskeletal: no contractures  Vascular: BLE edema equal  Skin:  Mons pubis, perineum, bilateral buttocks/sacrum with diffused macular rash with superimposed Sacrum/bilateral buttocks deep maroon discolored superficially denuded skin L 5cm x 5cm x 0.1cm, small amount of serosanguinous drainage   No odor, increased warmth, tenderness, induration, fluctuance, erythema    LABS:  10-28    147[H]  |  114[H]  |  37[H]  ----------------------------<  120[H]  3.9   |  24  |  1.00    Ca    9.0      28 Oct 2024 06:48                            12.8   8.20  )-----------( 134      ( 28 Oct 2024 06:48 )             41.6       Urinalysis Basic - ( 28 Oct 2024 06:48 )    Color: x / Appearance: x / SG: x / pH: x  Gluc: 120 mg/dL / Ketone: x  / Bili: x / Urobili: x   Blood: x / Protein: x / Nitrite: x   Leuk Esterase: x / RBC: x / WBC x   Sq Epi: x / Non Sq Epi: x / Bacteria: x

## 2024-10-28 NOTE — CONSULT NOTE ADULT - ASSESSMENT
#Groin rash   C/w cutaneous candidiasis. Likely w/ component of ICD   --will treat for candidiasis as below with oral fluconazole. Drug interactions reviewed for this medication along with patient's current inpatient regimen and found to be safe for use   --Fluconazole 200mg x 1 day followed by 100mg/d for 6 days for total 7day course  --Nystatin powder BID  --Derm will continue to follow     The patient's chart was reviewed in addition to being seen and examined at bedside with the dermatology attending Dr. Gomez .  Recommendations were communicated with the primary team.  Please page [phone] with a 10-digit call-back number for further related questions.    _______________ Thank you for allowing us to participate in the care of this patient.  Please alert Dermatology for any new or worsening developments.    Naa Mark MD  Resident Physician, PGY-2  [facility] Dermatology  Pager: [phone]  Office: [phone] #Groin rash   C/w cutaneous candidiasis. Likely w/ component of ICD   --oral fluconazole may more effectively treat however combination with Quetiapine may lengthen QT interval. Recommend Nystatin cream alone for now, may consider oral fluconazole with careful monitoring in future   --Nystatin BID-TID  --Derm will continue to follow     The patient's chart was reviewed in addition to being seen and examined at bedside with the dermatology attending Dr. Gomez .  Recommendations were communicated with the primary team.  Please page [phone] with a 10-digit call-back number for further related questions.    _______________ Thank you for allowing us to participate in the care of this patient.  Please alert Dermatology for any new or worsening developments.    Naa Mark MD  Resident Physician, PGY-2  [facility] Dermatology  Pager: [phone]  Office: [phone]

## 2024-10-28 NOTE — PROGRESS NOTE ADULT - PROBLEM SELECTOR PLAN 1
Inpatient Plan:  - Check BGs TID AC, HS, and 2AM while on PO diet or q6h if NPO  - Continue Lantus to 10u QHS   - C/w low dose Admelog correctional scales TID AC, HS, and 2AM  - Check C-peptide tomorrow AM  - RN to provide family education regarding insulin pen injections prior to discharge    Discharge Planning:  - Basal-bolus vs basal plus orals (doses TBD closer to d/c)  - Patient/family will need home care upon discharge due to new need for home insulin therapy.   - OUT-PATIENT FOLLOW UP: Patient should follow up with Endocrinologist Dr. Sophia Wilson MD or can follow up with our Endocrinology office [phone]) at [address] [place] [phone] if more convenient.   - Recommend routine Opthalmology and Podiatry follow up.     Pt will need RX for basal insulin pen (ie. Basaglar, Lantus, Tresiba, Toujeo, Levemir) and bolus insulin pen (ie. humalog/novolog/admelog) depending on insurance coverage; please send test scripts to see which is covered. Please send prescriptions for diabetes supplies (glucometer, test strips, lancets, alcohol swabs, insulin pen needles).

## 2024-10-28 NOTE — DIETITIAN INITIAL EVALUATION ADULT - NSICDXPASTSURGICALHX_GEN_ALL_CORE_FT
Consultation - Radiation Oncology      MRN:[id number] : 1941  Encounter: [id number]  Patient Information: Jay Roach Jr.      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 is a 83 y.o. 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  He is being referred by Dr. Schofield for consideration of SBRT since he is not a surgical candidate.     24 PCP, Dr. Hadley  encouraged to quit using tobacco   Pulmonary nodules  -     CT chest wo contrast     24 CT chest wo contrast  1. Enlargement of the left lower lobe lung nodule which now has a diameter of 9 mm.   Indeterminate solid pulmonary nodule measuring 9 mm. In a patient of unknown risk level with a solid nodule >8 mm, consider PET/CT or tissue sampling, vs. CT at 3 months.      3/21/24 Dr. Schofield  CT scan demonstrates an approximately 9 mm pulmonary nodule.   concerning for lung cancer.   At this time, I am recommending a PET CT scan as well as PFTs and an IR biopsy.      24 PET CT  1. Patient's known increasing in size left lower lobe lung nodule currently measures 1.2 cm in maximal dimension and appears to be associated with mild misregistered focal FDG activity. Findings are highly suspicious for developing hypermetabolic   malignancy and further evaluation with tissue sampling is recommended.   2. No evidence for hypermetabolic metastatic disease.     24 IR lung biopsy  Pathology demonstrated  NSCLC, SCC     4/17/24 Hospital admission  Presented to outpatient IR for planned left lower lung mass biopsy; sharlene-procedural complication due to patient movement, leading to hemoptysis and hypoxia  Medical emergency called, ICU evaluated patient alongside IR  Biopsy result pending at the time of the discharge and recommend outpatient follow-up with PCP/pulmonology.      4/25/24 Dr. Schofield  Unfortunately, his biopsy was complicated by hemothorax and required an additional stay in the hospital.   discussed that this appears to be a non-small cell lung cancer.   This does appear to be a clinical stage I.   With the patient's comorbidities and his active smoking, I believe that the patient would be best served with stereotactic body radiation therapy.     History was reviewed with patient and acknowledged.  He notes hemoptysis from hemothorax resolved completely about 1-2 days ago.  He denies shortness of breath at rest, progressive cough, fever, or progressive dyspnea on exertion.  He is able to perform daily activities of living without symptoms.  He does not use supplemental O2.  He denies chest pain, palpitations, lower extremity edema.    He is still actively smoking and is not planning on quitting at this time.    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 Left lung nodule, biopsy: Non-small cell carcinoma consistent with a squamous cell carcinoma.            Past Medical History:   Diagnosis Date    Atherosclerosis of autologous vein bypass graft(s) of other extremity with  ulceration (HCC) 09/10/2021    Atherosclerosis of native artery of right lower extremity with ulceration of midfoot (HCC) 02/24/2023    Basal cell carcinoma     right cheek    CAD (coronary artery disease)     Carotid stenosis, asymptomatic, bilateral     Chronic kidney disease     Colon polyp     Dependence on respirator (ventilator) status ([facility]) 04/07/2023    Diabetes ([facility])     type 2, non-insulin dependent    Diabetes mellitus (HCC)     GERD (gastroesophageal reflux disease)     History of nephrolithiasis     Hyperlipidemia     Hypertension     Left foot pain 11/30/2022    PAD (peripheral artery disease) ([facility])     Severe aortic stenosis     Squamous cell skin cancer 11/22/2022    left superior helix     Past Surgical History:   Procedure Laterality Date    APPENDECTOMY      CARDIAC CATHETERIZATION N/A 05/05/2022    Procedure: CARDIAC RHC/LHC;  Surgeon: Arvin Sanchez DO;  [facility]: [facility];  Service: Cardiology    CARDIAC CATHETERIZATION N/A 05/05/2022    Procedure: Cardiac Coronary Angiogram;  Surgeon: Arvin Sanchez DO;  [facility];  Service: Cardiology    CARDIAC CATHETERIZATION N/A 05/24/2022    Procedure: Cardiac pci;  Surgeon: Damien Jeter MD;  [facility];  Service: Cardiology    CARDIAC CATHETERIZATION N/A 06/14/2022    Procedure: CARDIAC TAVR;  Surgeon: Nahum Vaz MD;  Location[facility];  Service: Cardiology    COLECTOMY      COLONOSCOPY  2013    CORONARY ARTERY BYPASS GRAFT  2013    X 2    FEMORAL ARTERY - POPLITEAL ARTERY BYPASS GRAFT      HEMORRHOID SURGERY      IR AORTAGRAM WITH RUN-OFF  11/19/2018    IR AORTAGRAM WITH RUN-OFF  03/02/2023    IR BIOPSY LUNG  4/17/2024    IR LOWER EXTREMITY ANGIOGRAM  03/23/2023    MOHS SURGERY Left 01/11/2023    SCC left superior helix-Dr Tovar    CT SLCTV CATHJ 3RD+ ORD SLCTV ABDL PEL/LXTR BRNCH Left 08/12/2016    Procedure: LEFT FEMORAL ARTERIOGRAM; BALLOON ANGIOPLASTY; SFA  AND FEMORAL AT VEIN  GRAFT;  Surgeon: Nicholas Urena MD;  Location[facility];  Service: Vascular    MN TEAEC W/WO PATCH GRAFT COMMON FEMORAL Right 03/23/2023    Procedure: Common femoral endarterectomy&antegrade intervention of SFA, popliteal artery w/ shockwave;  Surgeon: Eagle Higuera MD;  Location: [facility];  Service: Vascular    MN TRANSCATHETER TRANSAPICAL REPLACEMT AORTIC VALVE N/A 06/14/2022    Procedure: REPLACEMENT AORTIC VALVE TRANSCATHETER (TAVR) TRANSAPICAL 29MM IRVIN KYLER S3 ULTRA VALVE(ACCESS ON LEFT) ELANA;  Surgeon: Amarjit Gordon DO;  Location: [facility];  Service: Cardiac Surgery    SKIN CANCER EXCISION      TONSILLECTOMY AND ADENOIDECTOMY      UPPER GASTROINTESTINAL ENDOSCOPY         Family History   Problem Relation Age of Onset    Heart attack Father     Other Sister         bypass and vlave replacement    Stroke Paternal Uncle     Arrhythmia Neg Hx     Asthma Neg Hx     Clotting disorder Neg Hx     Fainting Neg Hx     Anuerysm Neg Hx     Hypertension Neg Hx         unsure     Hyperlipidemia Neg Hx     Heart failure Neg Hx        Social History   Social History     Substance and Sexual Activity   Alcohol Use Not Currently    Comment: rare     Social History     Substance and Sexual Activity   Drug Use No     Social History     Tobacco Use   Smoking Status Every Day    Current packs/day: 1.00    Average packs/day: 0.6 packs/day for 100.0 years (62.5 ttl pk-yrs)    Types: Cigarettes    Passive exposure: Current   Smokeless Tobacco Never   Tobacco Comments    1/2 ppd         Meds/Allergies     Current Outpatient Medications:     allopurinol (ZYLOPRIM) 300 mg tablet, Take 1 tablet (300 mg total) by mouth daily, Disp: 90 tablet, Rfl: 1    amLODIPine (NORVASC) 10 mg tablet, TAKE 1 TABLET DAILY, Disp: 90 tablet, Rfl: 3    atorvastatin (LIPITOR) 80 mg tablet, TAKE 1 TABLET DAILY AT     BEDTIME, Disp: 90 tablet, Rfl: 3    calcitriol (ROCALTROL) 0.25 mcg capsule, Take 1 capsule (0.25 mcg total) by mouth  daily, Disp: 90 capsule, Rfl: 4    clopidogrel (PLAVIX) 75 mg tablet, Take 1 tablet (75 mg total) by mouth daily, Disp: 10 tablet, Rfl: 1    glimepiride (AMARYL) 1 mg tablet, Take 1 tablet (1 mg total) by mouth daily with breakfast, Disp: 90 tablet, Rfl: 1    hydrALAZINE (APRESOLINE) 25 mg tablet, Take 1 tablet (25 mg total) by mouth 2 (two) times a day, Disp: 180 tablet, Rfl: 3    metoprolol succinate (TOPROL-XL) 25 mg 24 hr tablet, Take 0.5 tablets (12.5 mg total) by mouth daily, Disp: 90 tablet, Rfl: 1    pancrelipase, Lip-Prot-Amyl, (CREON) 24,000 units, Take 24,000 units of lipase by mouth 3 (three) times a day with meals, Disp: 360 capsule, Rfl: 1    pantoprazole (PROTONIX) 40 mg tablet, Take 1 tablet (40 mg total) by mouth 2 (two) times a day, Disp: 180 tablet, Rfl: 1    mupirocin (BACTROBAN) 2 % ointment, Apply topically 3 (three) times a day (Patient not taking: Reported on 3/7/2024), Disp: 22 g, Rfl: 0    torsemide (DEMADEX) 20 mg tablet, TAKE 0.5 TABLETS (10 MG TOTAL) BY MOUTH DAILY TAKES 10 MG ONCE A DAY. (Patient not taking: Reported on 5/3/2024), Disp: 90 tablet, Rfl: 3  No Known Allergies      Review of Systems   Constitutional: Negative.    HENT: Negative.     Eyes: Negative.    Respiratory:  Positive for cough (Daily, productive,  with clear mucous, was blood tinged up until yesterday) and shortness of breath (with exertion). Negative for chest tightness and wheezing.    Cardiovascular:  Negative for chest pain.   Gastrointestinal: Negative.    Endocrine: Negative.    Genitourinary: Negative.    Musculoskeletal: Negative.    Skin: Negative.    Allergic/Immunologic: Negative.    Neurological: Negative.    Hematological: Negative.    Psychiatric/Behavioral: Negative.           OBJECTIVE:   /60 (BP Location: Left arm, Patient Position: Sitting, Cuff Size: Standard)   Pulse 60   Temp 97.9 °F (36.6 °C) (Temporal)   Resp 18   SpO2 98%   Performance Status: Karnofsky: 80 - Normal activity with  effort; some signs or symptoms of disease    Physical Exam  Vitals and nursing note reviewed.   Constitutional:       General: He is not in acute distress.  Cardiovascular:      Rate and Rhythm: Normal rate and regular rhythm.      Heart sounds: Murmur (systolic murmur) heard.   Pulmonary:      Breath sounds: No wheezing, rhonchi or rales.   Abdominal:      Palpations: Abdomen is soft.      Tenderness: There is no abdominal tenderness.   Musculoskeletal:      Right lower leg: No edema.      Left lower leg: No edema.   Lymphadenopathy:      Cervical: No cervical adenopathy.   Neurological:      Mental Status: He is alert and oriented to person, place, and time.      Gait: Gait normal.        RESULTS  Imaging Studies  IR biopsy lung    Result Date: 4/17/2024  Narrative: PROCEDURE: CT-guided lung biopsy Procedural Personnel Attending physician(s): Damian Mills MD Pre-procedure diagnosis: Hypermetabolic solid pulmonary nodule Post-procedure diagnosis: Same Clinical History: 83-year-old man with history of CAD, CKD3, T2DM, prior smoking, prior asbestos exposure, and an enlarging left lung mildly hypermetabolic solitary pulmonary nodule suspicious for malignancy. He has been referred for image-guided biopsy for further evaluation. PROCEDURE SUMMARY: CT-guided lung biopsy PROCEDURE DETAILS: Pre-procedure Consent: Informed consent for the procedure including risks, benefits and alternatives was obtained and time-out was performed prior to the procedure. Preparation: The site was prepared and draped using maximal sterile barrier technique including cutaneous antisepsis. Anesthesia/sedation Level of anesthesia/sedation: Moderate sedation (conscious sedation) Anesthesia/sedation administered by: Independent trained observer under attending supervision with continuous monitoring of the patient's level of consciousness and physiologic status Total intra-service sedation time: 45 min Technique and Findings: The  patient was positioned prone on the CT bed. Initial preprocedure CT demonstrated a solid pulmonary nodule positioned peripherally in the left lower lobe, previously characterized as hypermetabolic. Given its position, decision was made to access from  a posterior approach, as a lateral approach was thought to provide insufficient purchase for safe sampling. Local anesthetic was administered. A dermatotomy was made. Under intermittent CT guidance, a 17-gauge coaxial introducer needle was advanced percutaneously into the lung. Unfortunately, during advancement of the access needle, the patient moved his left shoulder somewhat while sedated, resulting in deviation of the initial access pathway. Pulmonary hemorrhage developed along the initial access tract. The needle was repositioned to permit sampling of the lung nodule. A moderate volume of intraparenchymal hemorrhage continue to develop without hemoptysis. Vital signs were stable throughout. Two 18-gauge by 1.3 cm biopsy specimens were obtained using a BioPince device. At this point, the patient developed coughing. Interval CT demonstrated no pneumothorax but enlarging parenchymal hemorrhage. There was no significant volume of blood products  in the central airways or the right lung. He then developed hemoptysis productive of a significant volume of bright red blood. Decision was made to terminate the procedure. An attempt was made at using a BioSentry device to seal the access tract; however, due to blood return along the access needle, the BioSentry device expanded within the needle prior to complete device deployment. Given ongoing hemoptysis, a second BioSentry device was not opened and the needle was promptly removed in order to turn the patient to a left lateral decubitus position and protect the central airways/right lung. Vital signs were monitored and oxygen saturations were maintained throughout. His blood pressure decreased from 170 mmHg systolic at the  beginning of the procedure down to 110s. Due to ongoing hemoptysis and development of relative hypotension, a rapid response was called. Interval images were obtained during evaluation by the rapid response team, demonstrating stable left lung pulmonary hemorrhage and no pneumothorax. Decision was made to transfer to the emergency department for stabilization and likely admission. Contrast None. Radiation Dose CT-DLP: 1383 mGy-cm Additional Details Specimens removed: None Estimated blood loss (mL): 5 mL external blood loss, intraparenchymal hemorrhage not quantifiable. Complications: Pulmonary parenchymal hemorrhage requiring rapid response, emergency department evaluation, and admission.     Impression: CT-guided lung biopsy complicated by significant pulmonary hemorrhage and hemoptysis. Two samples were obtained and sent for pathology prior to termination of the procedure. Plan: Emergency department evaluation and admission. Workstation performed: [id number]     NM PET CT skull base to mid thigh    Result Date: 4/12/2024  Narrative: PET/CT SCAN INDICATION: D38.1: Neoplasm of uncertain behavior of trachea, bronchus and lung R91.8: Other nonspecific abnormal finding of lung field MODIFIER: PI COMPARISON: CT of chest dated 2/28/2024 and CT of abdomen and pelvis dated 12/22/2023 CELL TYPE: Not applicable TECHNIQUE:   8.4 mCi F-18-FDG administered IV. Multiplanar attenuation corrected and non attenuation corrected PET images are available for interpretation, and contiguous, low dose, axial CT sections were obtained from the skull vertex through the femurs. Intravenous contrast material was not utilized. This examination, like all CT scans performed in the [facility], was performed utilizing techniques to minimize radiation dose exposure, including the use of iterative reconstruction and automated exposure control. Fasting serum glucose: 104 mg/dl FINDINGS: VISUALIZED BRAIN: No acute abnormalities are  seen. HEAD/NECK: There is a physiologic distribution of FDG. No FDG avid cervical adenopathy is seen. CT images: Intracranial atherosclerotic calcification is noted. CHEST: Mild focal FDG activity on PET image 141 with max SUV of 1.6 is likely misregistered (due to respiratory motion artifact) to the patient's known irregular left lower lobe lung nodule located slightly inferiorly on image 144 of series 3 which measures 1.2  cm. Developing malignancy is the diagnosis of exclusion and further evaluation with tissue sampling is recommended as clinically indicated. On image 154 of series 3 there is calcification about the peripheral aspect of the left ventricular apex which could reflect myocardial versus pericardial calcification without significant interval change. CT images: Atherosclerotic vascular calcifications including those of the coronary arteries are noted. Mild emphysematous changes. Nonspecific interstitial thickening involving the dependent aspects of the bilateral lower lobes. ABDOMEN: Nonspecific patchy FDG activity involving the gastric wall, possibly physiologic/inflammatory in etiology. CT images: Streak artifact related to patient's arms being down limits evaluation on low-dose unenhanced CT. Cholelithiasis. Atherosclerotic vascular calcifications are noted. Nonobstructing left renal calculus. Pancreatic calcifications suggestive of sequela chronic pancreatitis. Postsurgical changes about the cecum. Diverticulosis coli. PELVIS: Mild nonspecific heterogeneous prostate activity, slightly asymmetric towards the right but not definitively focal in nature, possibly physiologic/inflammatory in etiology. CT images: Postsurgical changes in the bilateral inguinal regions. Heterogeneous prostate gland. OSSEOUS STRUCTURES: No FDG avid lesions are seen. CT images: Degenerative changes are noted involving the spine. Median sternotomy wires are present.     Impression: 1. Patient's known increasing in size left  lower lobe lung nodule currently measures 1.2 cm in maximal dimension and appears to be associated with mild misregistered focal FDG activity. Findings are highly suspicious for developing hypermetabolic malignancy and further evaluation with tissue sampling is recommended. 2. No evidence for hypermetabolic metastatic disease. Please see above for details and additional findings. The study was marked in EPIC for significant notification. Workstation performed: [id number]         Pathology:Collected 4/17/2024 09:07     Status: Final result     [facility] patient: No [facility]     0 Result Notes      Component    Case Report   Surgical Pathology Report                         Case: [id number]                                   Authorizing Provider:  Ly Schofield MD    Collected:           04/17/2024 0907               Ordering Location:     [facility] Received:            04/17/2024 1117                                      CT Scan                                                                       Pathologist:           Bang Porras MD                                                          Specimen:    Lung, left nodule                                                                          Final Diagnosis   A.  Left lung nodule, biopsy:  Non-small cell carcinoma consistent with a squamous cell carcinoma.       Electronically signed by Bang Porras MD on 4/23/2024 at 121              ASSESSMENT  1. 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 is a 83 y.o. man  "with multiple comorbidities and nonsurgical candidate who was recently diagnosed with stage I NSCLC, squamous cell carcinoma of the left lung.      I reviewed imaging and results with the patient today.  Given location of his tumor and his clinical presentation, I agree with the recommendations for definitive stereotactic body radiotherapy (SBRT).  We would plan a dose of 50-55Gy in 3 to 5 fractions.  This technique has demonstrated 80-90+% local control and cure rates for stage I NSCLC.  Recommendations follow NCCN guidelines.    Treatments are generally well tolerated.    Based on location of the tumor, the main concerns for toxicity would be pulmonary fibrosis with decreased function, pneumonitis, and rib fracture or other chest wall injury.  The risk for pneumonitis and fracture are low, but can be chronic and/or very symptomatic.  Pulmonary fibrosis is expected, but SBRT is the most lung sparing of definitve lung cancer treatments presently.  He may also experience some fatigue.    He was given a chance to ask questions that were answered at length.  He wished to proceed with the recommended treatment plan.      4DCT simulation next week.  We will plan to begin radiation soon thereafter.  Smoking cessation counseling provided, but patient is not ready to quit.    I have spent a total time of 55 minutes on 05/03/24 in caring for this patient including Diagnostic results, Prognosis, Risks and benefits of tx options, Documenting in the medical record, Reviewing / ordering tests, medicine, procedures  , and Obtaining or reviewing history  .     Lilia Herrera MD  5/3/2024,12:35 PM      Portions of the record may have been created with voice recognition software.  Occasional wrong word or \"sound a like\" substitutions may have occurred due to the inherent limitations of voice recognition software.  Read the chart carefully and recognize, using context, where substitutions have occurred.          " PAST SURGICAL HISTORY:  S/P appendectomy     S/P cataract extraction, right

## 2024-10-29 ENCOUNTER — TRANSCRIPTION ENCOUNTER (OUTPATIENT)
Age: 84
End: 2024-10-29

## 2024-10-29 DIAGNOSIS — N12 TUBULO-INTERSTITIAL NEPHRITIS, NOT SPECIFIED AS ACUTE OR CHRONIC: ICD-10-CM

## 2024-10-29 LAB
-  AMOXICILLIN/CLAVULANIC ACID: SIGNIFICANT CHANGE UP
-  AMOXICILLIN/CLAVULANIC ACID: SIGNIFICANT CHANGE UP
-  AMPICILLIN/SULBACTAM: SIGNIFICANT CHANGE UP
-  AMPICILLIN/SULBACTAM: SIGNIFICANT CHANGE UP
-  AMPICILLIN: SIGNIFICANT CHANGE UP
-  AMPICILLIN: SIGNIFICANT CHANGE UP
-  AZTREONAM: SIGNIFICANT CHANGE UP
-  AZTREONAM: SIGNIFICANT CHANGE UP
-  CEFAZOLIN: SIGNIFICANT CHANGE UP
-  CEFAZOLIN: SIGNIFICANT CHANGE UP
-  CEFEPIME: SIGNIFICANT CHANGE UP
-  CEFEPIME: SIGNIFICANT CHANGE UP
-  CEFOXITIN: SIGNIFICANT CHANGE UP
-  CEFOXITIN: SIGNIFICANT CHANGE UP
-  CEFTRIAXONE: SIGNIFICANT CHANGE UP
-  CEFTRIAXONE: SIGNIFICANT CHANGE UP
-  CEFUROXIME: SIGNIFICANT CHANGE UP
-  CEFUROXIME: SIGNIFICANT CHANGE UP
-  CIPROFLOXACIN: SIGNIFICANT CHANGE UP
-  CIPROFLOXACIN: SIGNIFICANT CHANGE UP
-  ERTAPENEM: SIGNIFICANT CHANGE UP
-  ERTAPENEM: SIGNIFICANT CHANGE UP
-  GENTAMICIN: SIGNIFICANT CHANGE UP
-  GENTAMICIN: SIGNIFICANT CHANGE UP
-  IMIPENEM: SIGNIFICANT CHANGE UP
-  IMIPENEM: SIGNIFICANT CHANGE UP
-  LEVOFLOXACIN: SIGNIFICANT CHANGE UP
-  LEVOFLOXACIN: SIGNIFICANT CHANGE UP
-  MEROPENEM: SIGNIFICANT CHANGE UP
-  MEROPENEM: SIGNIFICANT CHANGE UP
-  NITROFURANTOIN: SIGNIFICANT CHANGE UP
-  NITROFURANTOIN: SIGNIFICANT CHANGE UP
-  PIPERACILLIN/TAZOBACTAM: SIGNIFICANT CHANGE UP
-  PIPERACILLIN/TAZOBACTAM: SIGNIFICANT CHANGE UP
-  TOBRAMYCIN: SIGNIFICANT CHANGE UP
-  TOBRAMYCIN: SIGNIFICANT CHANGE UP
-  TRIMETHOPRIM/SULFAMETHOXAZOLE: SIGNIFICANT CHANGE UP
-  TRIMETHOPRIM/SULFAMETHOXAZOLE: SIGNIFICANT CHANGE UP
ALBUMIN SERPL ELPH-MCNC: 2.8 G/DL — LOW (ref 3.3–5)
ALP SERPL-CCNC: 89 U/L — SIGNIFICANT CHANGE UP (ref 40–120)
ALT FLD-CCNC: 30 U/L — SIGNIFICANT CHANGE UP (ref 10–45)
ANION GAP SERPL CALC-SCNC: 12 MMOL/L — SIGNIFICANT CHANGE UP (ref 5–17)
AST SERPL-CCNC: 33 U/L — SIGNIFICANT CHANGE UP (ref 10–40)
BILIRUB SERPL-MCNC: 0.3 MG/DL — SIGNIFICANT CHANGE UP (ref 0.2–1.2)
BUN SERPL-MCNC: 26 MG/DL — HIGH (ref 7–23)
CALCIUM SERPL-MCNC: 8.6 MG/DL — SIGNIFICANT CHANGE UP (ref 8.4–10.5)
CHLORIDE SERPL-SCNC: 110 MMOL/L — HIGH (ref 96–108)
CO2 SERPL-SCNC: 20 MMOL/L — LOW (ref 22–31)
CREAT SERPL-MCNC: 0.85 MG/DL — SIGNIFICANT CHANGE UP (ref 0.5–1.3)
CULTURE RESULTS: ABNORMAL
CULTURE RESULTS: ABNORMAL
EGFR: 68 ML/MIN/1.73M2 — SIGNIFICANT CHANGE UP
GLUCOSE BLDC GLUCOMTR-MCNC: 127 MG/DL — HIGH (ref 70–99)
GLUCOSE BLDC GLUCOMTR-MCNC: 148 MG/DL — HIGH (ref 70–99)
GLUCOSE BLDC GLUCOMTR-MCNC: 177 MG/DL — HIGH (ref 70–99)
GLUCOSE BLDC GLUCOMTR-MCNC: 183 MG/DL — HIGH (ref 70–99)
GLUCOSE BLDC GLUCOMTR-MCNC: 193 MG/DL — HIGH (ref 70–99)
GLUCOSE BLDC GLUCOMTR-MCNC: 206 MG/DL — HIGH (ref 70–99)
GLUCOSE BLDC GLUCOMTR-MCNC: 66 MG/DL — LOW (ref 70–99)
GLUCOSE BLDC GLUCOMTR-MCNC: 69 MG/DL — LOW (ref 70–99)
GLUCOSE BLDC GLUCOMTR-MCNC: 74 MG/DL — SIGNIFICANT CHANGE UP (ref 70–99)
GLUCOSE BLDC GLUCOMTR-MCNC: 90 MG/DL — SIGNIFICANT CHANGE UP (ref 70–99)
GLUCOSE SERPL-MCNC: 84 MG/DL — SIGNIFICANT CHANGE UP (ref 70–99)
HCT VFR BLD CALC: 38.4 % — SIGNIFICANT CHANGE UP (ref 34.5–45)
HGB BLD-MCNC: 12 G/DL — SIGNIFICANT CHANGE UP (ref 11.5–15.5)
MCHC RBC-ENTMCNC: 30 PG — SIGNIFICANT CHANGE UP (ref 27–34)
MCHC RBC-ENTMCNC: 31.3 GM/DL — LOW (ref 32–36)
MCV RBC AUTO: 96 FL — SIGNIFICANT CHANGE UP (ref 80–100)
METHOD TYPE: SIGNIFICANT CHANGE UP
METHOD TYPE: SIGNIFICANT CHANGE UP
NRBC # BLD: 0 /100 WBCS — SIGNIFICANT CHANGE UP (ref 0–0)
ORGANISM # SPEC MICROSCOPIC CNT: ABNORMAL
PLATELET # BLD AUTO: 126 K/UL — LOW (ref 150–400)
POTASSIUM SERPL-MCNC: 3.6 MMOL/L — SIGNIFICANT CHANGE UP (ref 3.5–5.3)
POTASSIUM SERPL-SCNC: 3.6 MMOL/L — SIGNIFICANT CHANGE UP (ref 3.5–5.3)
PROT SERPL-MCNC: 5.5 G/DL — LOW (ref 6–8.3)
RBC # BLD: 4 M/UL — SIGNIFICANT CHANGE UP (ref 3.8–5.2)
RBC # FLD: 12.8 % — SIGNIFICANT CHANGE UP (ref 10.3–14.5)
SODIUM SERPL-SCNC: 142 MMOL/L — SIGNIFICANT CHANGE UP (ref 135–145)
SPECIMEN SOURCE: SIGNIFICANT CHANGE UP
SPECIMEN SOURCE: SIGNIFICANT CHANGE UP
WBC # BLD: 8.27 K/UL — SIGNIFICANT CHANGE UP (ref 3.8–10.5)
WBC # FLD AUTO: 8.27 K/UL — SIGNIFICANT CHANGE UP (ref 3.8–10.5)

## 2024-10-29 PROCEDURE — 99222 1ST HOSP IP/OBS MODERATE 55: CPT | Mod: FS

## 2024-10-29 PROCEDURE — 99232 SBSQ HOSP IP/OBS MODERATE 35: CPT

## 2024-10-29 RX ORDER — INSULIN GLARGINE,HUM.REC.ANLOG 100/ML
7 VIAL (ML) SUBCUTANEOUS AT BEDTIME
Refills: 0 | Status: DISCONTINUED | OUTPATIENT
Start: 2024-10-29 | End: 2024-10-29

## 2024-10-29 RX ORDER — SULFAMETHOXAZOLE/TRIMETHOPRIM 800-160 MG
1 TABLET ORAL
Qty: 10 | Refills: 0
Start: 2024-10-29 | End: 2024-11-02

## 2024-10-29 RX ORDER — SULFAMETHOXAZOLE/TRIMETHOPRIM 800-160 MG
1 TABLET ORAL
Refills: 0 | Status: DISCONTINUED | OUTPATIENT
Start: 2024-10-29 | End: 2024-10-30

## 2024-10-29 RX ORDER — FLUCONAZOLE, SODIUM CHLORIDE 2 MG/ML
250 INJECTION INTRAVENOUS ONCE
Refills: 0 | Status: COMPLETED | OUTPATIENT
Start: 2024-10-29 | End: 2024-10-29

## 2024-10-29 RX ORDER — FLUCONAZOLE, SODIUM CHLORIDE 2 MG/ML
100 INJECTION INTRAVENOUS DAILY
Refills: 0 | Status: DISCONTINUED | OUTPATIENT
Start: 2024-10-30 | End: 2024-10-30

## 2024-10-29 RX ORDER — INSULIN GLARGINE,HUM.REC.ANLOG 100/ML
5 VIAL (ML) SUBCUTANEOUS AT BEDTIME
Refills: 0 | Status: DISCONTINUED | OUTPATIENT
Start: 2024-10-29 | End: 2024-10-30

## 2024-10-29 RX ADMIN — HEPARIN SODIUM 5000 UNIT(S): 10000 INJECTION INTRAVENOUS; SUBCUTANEOUS at 16:05

## 2024-10-29 RX ADMIN — Medication 40 MILLIGRAM(S): at 18:01

## 2024-10-29 RX ADMIN — Medication 40 MILLIGRAM(S): at 05:34

## 2024-10-29 RX ADMIN — MEMANTINE HYDROCHLORIDE 10 MILLIGRAM(S): 21 CAPSULE, EXTENDED RELEASE ORAL at 18:00

## 2024-10-29 RX ADMIN — Medication 40 MILLIGRAM(S): at 22:05

## 2024-10-29 RX ADMIN — Medication 1 TABLET(S): at 18:00

## 2024-10-29 RX ADMIN — HEPARIN SODIUM 5000 UNIT(S): 10000 INJECTION INTRAVENOUS; SUBCUTANEOUS at 05:34

## 2024-10-29 RX ADMIN — ESCITALOPRAM 10 MILLIGRAM(S): 10 TABLET, FILM COATED ORAL at 11:37

## 2024-10-29 RX ADMIN — FLUCONAZOLE, SODIUM CHLORIDE 250 MILLIGRAM(S): 2 INJECTION INTRAVENOUS at 22:05

## 2024-10-29 RX ADMIN — SODIUM CHLORIDE 50 MILLILITER(S): 9 INJECTION, SOLUTION INTRAMUSCULAR; INTRAVENOUS; SUBCUTANEOUS at 03:22

## 2024-10-29 RX ADMIN — QUETIAPINE FUMARATE 25 MILLIGRAM(S): 200 TABLET ORAL at 22:05

## 2024-10-29 RX ADMIN — Medication 500 MILLIGRAM(S): at 11:37

## 2024-10-29 RX ADMIN — NYSTATIN 1 APPLICATION(S): 100000 POWDER TOPICAL at 17:58

## 2024-10-29 RX ADMIN — Medication 325 MILLIGRAM(S): at 11:37

## 2024-10-29 RX ADMIN — Medication 10 MILLIGRAM(S): at 05:34

## 2024-10-29 RX ADMIN — MEMANTINE HYDROCHLORIDE 10 MILLIGRAM(S): 21 CAPSULE, EXTENDED RELEASE ORAL at 05:35

## 2024-10-29 RX ADMIN — NYSTATIN 1 APPLICATION(S): 100000 POWDER TOPICAL at 05:35

## 2024-10-29 RX ADMIN — Medication 5 UNIT(S): at 22:05

## 2024-10-29 RX ADMIN — HEPARIN SODIUM 5000 UNIT(S): 10000 INJECTION INTRAVENOUS; SUBCUTANEOUS at 22:05

## 2024-10-29 RX ADMIN — Medication 1: at 18:03

## 2024-10-29 RX ADMIN — Medication 1 TABLET(S): at 11:37

## 2024-10-29 RX ADMIN — Medication 10 MILLIGRAM(S): at 18:00

## 2024-10-29 NOTE — CHART NOTE - NSCHARTNOTEFT_GEN_A_CORE
Night medicine PA Event Note    Primary NP notified by day RN pt noted w/ "red dots" periorally and on face.  aware and reccs discontinuing IV Ceftriaxone at shift change. Ceftriaxone dc'ed s/p 2 doses.
Wound Care Team Note:    Request for wound care consult for sacral skin damage received from nursing. Patient seen yesterday for this indication. Dermatology consult appreciated. Please refer to my note dated 10.28 for management recommendations.    Lazara Evangelista NP-C, CWOCN via TEAMS

## 2024-10-29 NOTE — PROGRESS NOTE ADULT - PROBLEM SELECTOR PLAN 3
- patient on atorvastatin 40mg at home  - goal LDL in diabetes mellitus is <70

## 2024-10-29 NOTE — DISCHARGE NOTE PROVIDER - NSDCFUADDAPPT_GEN_ALL_CORE_FT
- OUT-PATIENT FOLLOW UP: Patient should follow up with Endocrinologist Dr. Sophia Wilson MD or can follow up with our Endocrinology office [phone]) at [address] [place] [phone] if more convenient.    APPTS ARE READY TO BE MADE: [ X] YES    Best Family or Patient Contact (if needed):    Additional Information about above appointments (if needed):    1: - OUT-PATIENT FOLLOW UP: Patient should follow up with Endocrinologist Dr. Sophia Wilson MD or can follow up with our Endocrinology office [phone]) at [address] [place] [phone] if more convenient.   2:   3:     Other comments or requests:        APPTS ARE READY TO BE MADE: [ X] YES    Best Family or Patient Contact (if needed):    Additional Information about above appointments (if needed):    1: - OUT-PATIENT FOLLOW UP: Patient should follow up with Endocrinologist Dr. Sophia Wilson MD or can follow up with our Endocrinology office [phone]) at [address] [place] [phone] if more convenient.   2:   3:     Other comments or requests:     Met with patient face to face and provided the patient with provider referral information, however patient prefers to schedule the appointments on their own. Spoke with patient's daughter & daugher-in-law. They will schedule appointment.

## 2024-10-29 NOTE — PROVIDER CONTACT NOTE (HYPOGLYCEMIA EVENT) - NS PROVIDER CONTACT ASSESS-HYPO
Patient is A&O*1 and is hemodynamically stable. Patient is asymptomatic and was given 2 4 oz of apple juices and was fed her breakfast. Fingerstick was repeated and is now 90.

## 2024-10-29 NOTE — CONSULT NOTE ADULT - ASSESSMENT
84 year old female with past medical history of diabetes, dementia baseline A&Ox1, and hypertension who presents to the emergency department after son measured glucose at home as "high" on glucose monitor. Work up in ED revealed + ua with subsequent UCX ecoli pan sensi., and leukocytosis of 12 and ID consulted.    Afebrile  WNL today (was 12 on admission)  Ucx >100,000 Ecoli  LActate of 3.5 on admission now resolved 1.9  Ceftriaxone 1g x 1 10/26  Currently off abx  #uncontrolled DM    Incomplete*************************************************** 84 year old female with past medical history of diabetes, dementia baseline A&Ox1, and hypertension who presents to the emergency department after son measured glucose at home as "high" on glucose monitor. Work up in ED revealed + ua with subsequent UCX ecoli pan sensi., and leukocytosis of 12, placed on ceftriaxone x 2 doses she has since remained off abx and ID consulted.    Afebrile  WNL today (was 12 on admission)  Ucx >100,000 Ecoli  LActate of 3.5 on admission now resolved 1.9  Ceftriaxone 1g  10/26-->10/27  Currently off abx  #uncontrolled DM    Plan  Overall pt  UCx  ecoli with 2 doses of ceftriaxone administered since admission then remained off abx, however seems improved.  Can give bactrim DS 1 tab bid for total 5 days.  Continue to monitor for signs of improvement  Continue to monitor for fever   Continue to trend WBC  Plan d/w Dr. Menendez and floor provider

## 2024-10-29 NOTE — CONSULT NOTE ADULT - SUBJECTIVE AND OBJECTIVE BOX
Patient is a 84y old  Female who presents with a chief complaint of Urinary tract infection    Chart reviewed, events noted. This is a "84 year old female with past medical history of diabetes, dementia baseline A&Ox1, and hypertension who presents to the emergency department after son measured glucose at home as "high" on glucose monitor. " (28 Oct 2024 13:43)    HPI:   84 year old female with past medical history of diabetes, dementia baseline A&Ox1, and hypertension who presents to the emergency department after son measured glucose at home as "high" on glucose monitor. Patient unable to provide history secondary to dementia. History provided by son. Son states patient had pointed at her head, but is unsure if that means she had a headache, but otherwise he had not noticed any other symptoms including vomiting, diarrhea, fevers, or chills. Son states that after glucose was measured as high, he called patient's PCP who said to take 2 more glipizide which he gave about 2-3 hours prior to arrival for a total fo 15mg glipizide today. (26 Oct 2024 10:49)     REVIEW OF SYSTEMS  [  ] ROS unobtainable because:    [ x ] All other systems negative except as noted below  Constitutional:  [ ] fever [ ] chills  [ ] weight loss  [ ]night sweat  [ ]poor appetite/PO intake [ ]fatigue   Skin:  [ ] rash [ ] phlebitis	  Eyes: [ ] icterus [ ] pain  [ ] discharge	  ENMT: [ ] sore throat  [ ] thrush [ ] ulcers [ ] exudates [ ]anosmia  Respiratory: [ ] dyspnea [ ] hemoptysis [ ] cough [ ] sputum	  Cardiovascular:  [ ] chest pain [ ] palpitations [ ] edema	  Gastrointestinal:  [ ] nausea [ ] vomiting [ ] diarrhea [ ] constipation [ ] pain	  Genitourinary:  [ ] dysuria [ ] frequency [ ] hematuria [ ] discharge [ ] flank pain  [ ] incontinence  Musculoskeletal:  [ ] myalgias [ ] arthralgias [ ] arthritis  [ ] back pain  Neurological:  [ ] headache [ ] weakness [ ] seizures  [ ] confusion/altered mental status  prior hospital charts reviewed [V]  primary team notes reviewed [V]  other consultant notes reviewed [V]    PAST MEDICAL & SURGICAL HISTORY:  Stenosis of carotid artery, bilateral      HTN (hypertension)      Hypercholesteremia      Diabetes mellitus, type II      Atrial fibrillation      Dementia      Uncontrolled type 2 diabetes mellitus with hyperglycemia      HTN (hypertension)      HLD (hyperlipidemia)      S/P appendectomy      S/P cataract extraction, right          SOCIAL HISTORY:  - Denied smoking/vaping/alcohol/recreational drug use    FAMILY HISTORY:  Family history of hypertension    Family history of hypercholesterolemia        Allergies  No Known Allergies        ANTIMICROBIALS:      ANTIMICROBIALS (past 90 days):  MEDICATIONS  (STANDING):  cefTRIAXone   IVPB   100 mL/Hr IV Intermittent (10-26-24 @ 04:38)    cefTRIAXone   IVPB   100 mL/Hr IV Intermittent (10-27-24 @ 12:01)   100 mL/Hr IV Intermittent (10-26-24 @ 13:20)        OTHER MEDS:   MEDICATIONS  (STANDING):  acetaminophen     Tablet .. 650 every 6 hours PRN  aspirin 325 daily  atorvastatin 40 at bedtime  dextrose 50% Injectable 25 once  dextrose 50% Injectable 25 once  dextrose 50% Injectable 12.5 once  dextrose Oral Gel 15 once  enalapril 10 two times a day  escitalopram 10 daily  glucagon  Injectable 1 once  heparin   Injectable 5000 every 8 hours  insulin glargine Injectable (LANTUS) 10 at bedtime  insulin lispro (ADMELOG) corrective regimen sliding scale  three times a day before meals  insulin lispro (ADMELOG) corrective regimen sliding scale  <User Schedule>  memantine 10 two times a day  QUEtiapine 25 at bedtime  sotalol. 40 every 12 hours      VITALS:  Vital Signs Last 24 Hrs  T(F): 98.3 (10-29-24 @ 05:36), Max: 99.2 (10-27-24 @ 05:21)    Vital Signs Last 24 Hrs  HR: 62 (10-29-24 @ 05:36) (62 - 66)  BP: 109/61 (10-29-24 @ 05:36) (109/61 - 119/73)  RR: 18 (10-29-24 @ 05:36)  SpO2: 98% (10-29-24 @ 05:36) (97% - 98%)  Wt(kg): --    EXAM:    GA: NAD, AOx3  HEENT: oral cavity no lesion  CV: nl S1/S2, no RMG  Lungs: CTAB, No distress  Abd: BS+, soft, nontender, no rebounding pain  Ext: no edema  Neuro: No focal deficits  Skin: Intact  IV: no phlebitis  Labs:                        12.0   8.27  )-----------( 126      ( 29 Oct 2024 07:13 )             38.4     10-29    142  |  110[H]  |  26[H]  ----------------------------<  84  3.6   |  20[L]  |  0.85    Ca    8.6      29 Oct 2024 07:09    TPro  5.5[L]  /  Alb  2.8[L]  /  TBili  0.3  /  DBili  x   /  AST  33  /  ALT  30  /  AlkPhos  89  10-29    WBC Trend:  WBC Count: 8.27 (10-29-24 @ 07:13)  WBC Count: 8.20 (10-28-24 @ 06:48)  WBC Count: 9.57 (10-27-24 @ 06:59)  WBC Count: 12.02 (10-26-24 @ 00:39)    Auto Neutrophil #: 8.45 K/uL (10-26-24 @ 00:39)    Creatine Trend:  Creatinine: 0.85 (10-29)  Creatinine: 1.00 (10-28)  Creatinine: 0.91 (10-27)  Creatinine: 1.09 (10-26)    Liver Biochemical Testing Trend:  Alanine Aminotransferase (ALT/SGPT): 30 (10-29)  Alanine Aminotransferase (ALT/SGPT): 23 (10-26)  Alanine Aminotransferase (ALT/SGPT): 28 (10-26)  Aspartate Aminotransferase (AST/SGOT): 33 (10-29-24 @ 07:09)  Aspartate Aminotransferase (AST/SGOT): 19 (10-26-24 @ 04:24)  Aspartate Aminotransferase (AST/SGOT): 26 (10-26-24 @ 00:39)  Bilirubin Total: 0.3 (10-29)  Bilirubin Total: 0.5 (10-26)  Bilirubin Total: 0.5 (10-26)    Trend LDH      Urinalysis Basic - ( 29 Oct 2024 07:09 )    Color: x / Appearance: x / SG: x / pH: x  Gluc: 84 mg/dL / Ketone: x  / Bili: x / Urobili: x   Blood: x / Protein: x / Nitrite: x   Leuk Esterase: x / RBC: x / WBC x   Sq Epi: x / Non Sq Epi: x / Bacteria: x      MICROBIOLOGY:        Culture - Urine (collected 26 Oct 2024 13:32)  Source: Clean Catch Clean Catch (Midstream)  Preliminary Report:    50,000 - 99,000 CFU/mL Escherichia coli    <10,000 CFU/ml Normal Urogenital storm present    Culture - Urine (collected 26 Oct 2024 03:32)  Source: Clean Catch Clean Catch (Midstream)  Preliminary Report:    >100,000 CFU/ml Escherichia coli    A1C with Estimated Average Glucose Result: 12.4 % (10-26-24 @ 00:39)    CSF:    RADIOLOGY:   Patient is a 84y old  Female who presents with a chief complaint of Urinary tract infection    Chart reviewed, events noted. This is a "84 year old female with past medical history of diabetes, dementia baseline A&Ox1, and hypertension who presents to the emergency department after son measured glucose at home as "high" on glucose monitor. " (28 Oct 2024 13:43)    HPI:   84 year old female with past medical history of diabetes, dementia baseline A&Ox1, and hypertension who presents to the emergency department after son measured glucose at home as "high" on glucose monitor. Patient unable to provide history secondary to dementia. History provided by son. Son states patient had pointed at her head, but is unsure if that means she had a headache, but otherwise he had not noticed any other symptoms including vomiting, diarrhea, fevers, or chills. Son states that after glucose was measured as high, he called patient's PCP who said to take 2 more glipizide which he gave about 2-3 hours prior to arrival for a total fo 15mg glipizide today. (26 Oct 2024 10:49)     REVIEW OF SYSTEMS  [ x ] ROS unobtainable because:  dementia  [  ] All other systems negative except as noted below  Constitutional:  [ ] fever [ ] chills  [ ] weight loss  [ ]night sweat  [ ]poor appetite/PO intake [ ]fatigue   Skin:  [ ] rash [ ] phlebitis	  Eyes: [ ] icterus [ ] pain  [ ] discharge	  ENMT: [ ] sore throat  [ ] thrush [ ] ulcers [ ] exudates [ ]anosmia  Respiratory: [ ] dyspnea [ ] hemoptysis [ ] cough [ ] sputum	  Cardiovascular:  [ ] chest pain [ ] palpitations [ ] edema	  Gastrointestinal:  [ ] nausea [ ] vomiting [ ] diarrhea [ ] constipation [ ] pain	  Genitourinary:  [ ] dysuria [ ] frequency [ ] hematuria [ ] discharge [ ] flank pain  [ ] incontinence  Musculoskeletal:  [ ] myalgias [ ] arthralgias [ ] arthritis  [ ] back pain  Neurological:  [ ] headache [ ] weakness [ ] seizures  [ ] confusion/altered mental status  prior hospital charts reviewed [V]  primary team notes reviewed [V]  other consultant notes reviewed [V]    PAST MEDICAL & SURGICAL HISTORY:  Stenosis of carotid artery, bilateral      HTN (hypertension)      Hypercholesteremia      Diabetes mellitus, type II      Atrial fibrillation      Dementia      Uncontrolled type 2 diabetes mellitus with hyperglycemia      HTN (hypertension)      HLD (hyperlipidemia)      S/P appendectomy      S/P cataract extraction, right          SOCIAL HISTORY:  - Denied smoking/vaping/alcohol/recreational drug use    FAMILY HISTORY:  Family history of hypertension    Family history of hypercholesterolemia        Allergies  No Known Allergies        ANTIMICROBIALS:      ANTIMICROBIALS (past 90 days):  MEDICATIONS  (STANDING):  cefTRIAXone   IVPB   100 mL/Hr IV Intermittent (10-26-24 @ 04:38)    cefTRIAXone   IVPB   100 mL/Hr IV Intermittent (10-27-24 @ 12:01)   100 mL/Hr IV Intermittent (10-26-24 @ 13:20)        OTHER MEDS:   MEDICATIONS  (STANDING):  acetaminophen     Tablet .. 650 every 6 hours PRN  aspirin 325 daily  atorvastatin 40 at bedtime  dextrose 50% Injectable 25 once  dextrose 50% Injectable 25 once  dextrose 50% Injectable 12.5 once  dextrose Oral Gel 15 once  enalapril 10 two times a day  escitalopram 10 daily  glucagon  Injectable 1 once  heparin   Injectable 5000 every 8 hours  insulin glargine Injectable (LANTUS) 10 at bedtime  insulin lispro (ADMELOG) corrective regimen sliding scale  three times a day before meals  insulin lispro (ADMELOG) corrective regimen sliding scale  <User Schedule>  memantine 10 two times a day  QUEtiapine 25 at bedtime  sotalol. 40 every 12 hours      VITALS:  Vital Signs Last 24 Hrs  T(F): 98.3 (10-29-24 @ 05:36), Max: 99.2 (10-27-24 @ 05:21)    Vital Signs Last 24 Hrs  HR: 62 (10-29-24 @ 05:36) (62 - 66)  BP: 109/61 (10-29-24 @ 05:36) (109/61 - 119/73)  RR: 18 (10-29-24 @ 05:36)  SpO2: 98% (10-29-24 @ 05:36) (97% - 98%)  Wt(kg): --    EXAM:    GA: NAD, arousable  HEENT: oral cavity no lesion  CV: nl S1/S2, no RMG  Lungs: CTAB, No distress  Abd: BS+, soft, nontender, no rebounding pain  Ext: no edema  Neuro: No focal deficits  Skin: Intact  IV: no phlebitis  Labs:                        12.0   8.27  )-----------( 126      ( 29 Oct 2024 07:13 )             38.4     10-29    142  |  110[H]  |  26[H]  ----------------------------<  84  3.6   |  20[L]  |  0.85    Ca    8.6      29 Oct 2024 07:09    TPro  5.5[L]  /  Alb  2.8[L]  /  TBili  0.3  /  DBili  x   /  AST  33  /  ALT  30  /  AlkPhos  89  10-29    WBC Trend:  WBC Count: 8.27 (10-29-24 @ 07:13)  WBC Count: 8.20 (10-28-24 @ 06:48)  WBC Count: 9.57 (10-27-24 @ 06:59)  WBC Count: 12.02 (10-26-24 @ 00:39)    Auto Neutrophil #: 8.45 K/uL (10-26-24 @ 00:39)    Creatine Trend:  Creatinine: 0.85 (10-29)  Creatinine: 1.00 (10-28)  Creatinine: 0.91 (10-27)  Creatinine: 1.09 (10-26)    Liver Biochemical Testing Trend:  Alanine Aminotransferase (ALT/SGPT): 30 (10-29)  Alanine Aminotransferase (ALT/SGPT): 23 (10-26)  Alanine Aminotransferase (ALT/SGPT): 28 (10-26)  Aspartate Aminotransferase (AST/SGOT): 33 (10-29-24 @ 07:09)  Aspartate Aminotransferase (AST/SGOT): 19 (10-26-24 @ 04:24)  Aspartate Aminotransferase (AST/SGOT): 26 (10-26-24 @ 00:39)  Bilirubin Total: 0.3 (10-29)  Bilirubin Total: 0.5 (10-26)  Bilirubin Total: 0.5 (10-26)    Trend LDH      Urinalysis Basic - ( 29 Oct 2024 07:09 )    Color: x / Appearance: x / SG: x / pH: x  Gluc: 84 mg/dL / Ketone: x  / Bili: x / Urobili: x   Blood: x / Protein: x / Nitrite: x   Leuk Esterase: x / RBC: x / WBC x   Sq Epi: x / Non Sq Epi: x / Bacteria: x      MICROBIOLOGY:        Culture - Urine (collected 26 Oct 2024 13:32)  Source: Clean Catch Clean Catch (Midstream)  Preliminary Report:    50,000 - 99,000 CFU/mL Escherichia coli    <10,000 CFU/ml Normal Urogenital storm present    Culture - Urine (collected 26 Oct 2024 03:32)  Source: Clean Catch Clean Catch (Midstream)  Preliminary Report:    >100,000 CFU/ml Escherichia coli    A1C with Estimated Average Glucose Result: 12.4 % (10-26-24 @ 00:39)    CSF:    RADIOLOGY:

## 2024-10-29 NOTE — DISCHARGE NOTE PROVIDER - NSDCMRMEDTOKEN_GEN_ALL_CORE_FT
alcohol swabs: Apply topically to affected area 4 times a day  aspirin 325 mg oral tablet: 1 tab(s) orally once a day  atorvastatin 40 mg oral tablet: 1 tab(s) orally once a day (at bedtime)  Calcium +Vitamin D3 oral tablet: 1 tablet orally once a day  enalapril 10 mg oral tablet: 1 tab(s) orally 2 times a day  escitalopram 10 mg oral tablet: 1 tab(s) orally once a day  glipiZIDE 5 mg oral tablet: 1 tab(s) orally once a day  glucometer (per patient&#x27;s insurance): Test blood sugars four times a day. Dispense #1 glucometer.  Insulin Pen Needles, 4mm: 1 application subcutaneously 4 times a day. ** Use with insulin pen **  lancets: 1 application subcutaneously 4 times a day  memantine 10 mg oral tablet: 1 tab(s) orally 2 times a day  QUEtiapine 25 mg oral tablet: 1 tab(s) orally once a day (at bedtime)  sotalol 80 mg oral tablet: 0.5 tab(s) orally 2 times a day  test strips (per patient&#x27;s insurance): 1 application subcutaneously 4 times a day. ** Compatible with patient&#x27;s glucometer **   alcohol swabs: Apply topically to affected area 4 times a day  aspirin 325 mg oral tablet: 1 tab(s) orally once a day  atorvastatin 40 mg oral tablet: 1 tab(s) orally once a day (at bedtime)  Calcium +Vitamin D3 oral tablet: 1 tablet orally once a day  enalapril 10 mg oral tablet: 1 tab(s) orally 2 times a day  escitalopram 10 mg oral tablet: 1 tab(s) orally once a day  glipiZIDE 5 mg oral tablet: 1 tab(s) orally once a day  glucometer (per patient&#x27;s insurance): Test blood sugars four times a day. Dispense #1 glucometer.  Insulin Pen Needles, 4mm: 1 application subcutaneously 4 times a day. ** Use with insulin pen **  lancets: 1 application subcutaneously 4 times a day  memantine 10 mg oral tablet: 1 tab(s) orally 2 times a day  QUEtiapine 25 mg oral tablet: 1 tab(s) orally once a day (at bedtime)  sotalol 80 mg oral tablet: 0.5 tab(s) orally 2 times a day  sulfamethoxazole-trimethoprim 800 mg-160 mg oral tablet: 1 tab(s) orally 2 times a day  test strips (per patient&#x27;s insurance): 1 application subcutaneously 4 times a day. ** Compatible with patient&#x27;s glucometer **   alcohol swabs: Apply topically to affected area 4 times a day  aspirin 325 mg oral tablet: 1 tab(s) orally once a day  atorvastatin 40 mg oral tablet: 1 tab(s) orally once a day (at bedtime)  Calcium +Vitamin D3 oral tablet: 1 tablet orally once a day  enalapril 10 mg oral tablet: 1 tab(s) orally 2 times a day  escitalopram 10 mg oral tablet: 1 tab(s) orally once a day  glucometer (per patient&#x27;s insurance): Test blood sugars four times a day. Dispense #1 glucometer.  Insulin Pen Needles, 4mm: 1 application subcutaneously 4 times a day. ** Use with insulin pen **  lancets: 1 application subcutaneously 4 times a day  memantine 10 mg oral tablet: 1 tab(s) orally 2 times a day  QUEtiapine 25 mg oral tablet: 1 tab(s) orally once a day (at bedtime)  sotalol 80 mg oral tablet: 0.5 tab(s) orally 2 times a day  sulfamethoxazole-trimethoprim 800 mg-160 mg oral tablet: 1 tab(s) orally 2 times a day  test strips (per patient&#x27;s insurance): 1 application subcutaneously 4 times a day. ** Compatible with patient&#x27;s glucometer **   alcohol swabs: Apply topically to affected area 4 times a day  alcohol swabs: Apply topically to affected area 4 times a day  aspirin 325 mg oral tablet: 1 tab(s) orally once a day  atorvastatin 40 mg oral tablet: 1 tab(s) orally once a day (at bedtime)  Calcium +Vitamin D3 oral tablet: 1 tablet orally once a day  enalapril 10 mg oral tablet: 1 tab(s) orally 2 times a day  escitalopram 10 mg oral tablet: 1 tab(s) orally once a day  fluconazole 100 mg oral tablet: 1 tab(s) orally once a day through 11/4/24  glucometer (per patient&#x27;s insurance): Test blood sugars four times a day. Dispense #1 glucometer.  Insulin Pen Needles, 4mm: 1 application subcutaneously 4 times a day. ** Use with insulin pen **  lancets: 1 application subcutaneously 4 times a day  memantine 10 mg oral tablet: 1 tab(s) orally 2 times a day  QUEtiapine 25 mg oral tablet: 1 tab(s) orally once a day (at bedtime)  repaglinide 0.5 mg oral tablet: 1 tab(s) orally 3 times a day (with meals)  sotalol 80 mg oral tablet: 0.5 tab(s) orally 2 times a day  sulfamethoxazole-trimethoprim 800 mg-160 mg oral tablet: 1 tab(s) orally 2 times a day through 11/3/24  sulfamethoxazole-trimethoprim 800 mg-160 mg oral tablet: 1 tab(s) orally 2 times a day  test strips (per patient&#x27;s insurance): 1 application subcutaneously 4 times a day. ** Compatible with patient&#x27;s glucometer **  Tradjenta 5 mg oral tablet: 1 tab(s) orally once a day   aspirin 325 mg oral tablet: 1 tab(s) orally once a day  atorvastatin 40 mg oral tablet: 1 tab(s) orally once a day (at bedtime)  Calcium +Vitamin D3 oral tablet: 1 tablet orally once a day  enalapril 10 mg oral tablet: 1 tab(s) orally 2 times a day  escitalopram 10 mg oral tablet: 1 tab(s) orally once a day  fluconazole 100 mg oral tablet: 1 tab(s) orally once a day through 11/4/24  memantine 10 mg oral tablet: 1 tab(s) orally 2 times a day  QUEtiapine 25 mg oral tablet: 1 tab(s) orally once a day (at bedtime)  repaglinide 0.5 mg oral tablet: 1 tab(s) orally 3 times a day (with meals)  sotalol 80 mg oral tablet: 0.5 tab(s) orally 2 times a day  sulfamethoxazole-trimethoprim 800 mg-160 mg oral tablet: 1 tab(s) orally 2 times a day through 11/3/24  Tradjenta 5 mg oral tablet: 1 tab(s) orally once a day

## 2024-10-29 NOTE — PROGRESS NOTE ADULT - PROBLEM SELECTOR PLAN 1
Inpatient Plan:  - Check BGs TID AC, HS, and 2AM while on PO diet or q6h if NPO  - Decrease Lantus to 7 units QHS   - C/w low dose Admelog correctional scales TID AC, HS, and 2AM  - Check C-peptide tomorrow AM  - RN to provide family education regarding insulin pen injections prior to discharge    Discharge Planning:  - Due to patients age and better control of BG while inpatient will send patient home on orals only.  -Patient/family should continue to check BG before each meal and at bedtime.   -Stop Glipizide 5mg daily  -start Tradjenta 5md daily  -start repaglinide 0.5mg with each meal, hold if Blood glucose is less than 150mg/dl.  -Contact PCP/endocrinology if BG less than 70 X1, or greater 400 X1 or if persistently greater than 250mg/dl,    may need to start basal insulin.    -Please make sure patient has DM management supplies (glucometer, lancets, strips, alcohol pads)  - OUT-PATIENT FOLLOW UP: Patient should follow up with Endocrinologist Dr. Sophia Wilson MD or can follow up with our Endocrinology office [phone]) at [address] [place] [phone] if more convenient.   - Recommend routine Opthalmology and Podiatry follow up.     Pt will need RX for basal insulin pen (ie. Basaglar, Lantus, Tresiba, Toujeo, Levemir) and bolus insulin pen (ie. humalog/novolog/admelog) depending on insurance coverage; please send test scripts to see which is covered. Please send prescriptions for diabetes supplies (glucometer, test strips, lancets, alcohol swabs, insulin pen needles). Inpatient Plan:  - Check BGs TID AC, HS, and 2AM while on PO diet or q6h if NPO  - Decrease Lantus to 7 units QHS   - C/w low dose Admelog correctional scales TID AC, HS, and 2AM  - Check C-peptide tomorrow AM  - RN to provide family education regarding insulin pen injections prior to discharge    Discharge Planning:  - Due to patients age and better control of BG while inpatient will send patient home on orals only.  -Patient/family should continue to check BG before each meal and at bedtime.   -Stop Glipizide 5mg daily  -start Tradjenta 5mg daily  -start repaglinide 0.5mg with each meal, hold if Blood glucose is less than 150mg/dl.  -Contact PCP/endocrinology if BG less than 70 X1, or greater 400 X1 or if persistently greater than 250mg/dl,    may need to start basal insulin.    -Please make sure patient has DM management supplies (glucometer, lancets, strips, alcohol pads)  - OUT-PATIENT FOLLOW UP: Patient should follow up with Endocrinologist Dr. Sophia Wilson MD or can follow up with our Endocrinology office [phone]) at [address] [place] [phone] if more convenient.   - Recommend routine Opthalmology and Podiatry follow up.     Pt will need RX for basal insulin pen (ie. Basaglar, Lantus, Tresiba, Toujeo, Levemir) and bolus insulin pen (ie. humalog/novolog/admelog) depending on insurance coverage; please send test scripts to see which is covered. Please send prescriptions for diabetes supplies (glucometer, test strips, lancets, alcohol swabs, insulin pen needles).

## 2024-10-29 NOTE — DISCHARGE NOTE PROVIDER - CARE PROVIDER_API CALL
Sophia Wilson  Endocrinology/Metab/Diabetes  [address] [place] [address]  Phone: [phone]  Fax: [phone]  Follow Up Time:

## 2024-10-29 NOTE — PROGRESS NOTE ADULT - PROBLEM SELECTOR PLAN 2
- patient on enalapril 10mg at home  - goal BP in diabetes mellitus is <130/80  - defer to primary team for management

## 2024-10-29 NOTE — PROVIDER CONTACT NOTE (HYPOGLYCEMIA EVENT) - NS PROVIDER CONTACT BACKGROUND-HYPO
Age: 84y    Gender: Female    POCT Blood Glucose:  90 mg/dL (10-29-24 @ 08:34)  66 mg/dL (10-29-24 @ 08:16)  69 mg/dL (10-29-24 @ 08:06)  74 mg/dL (10-29-24 @ 06:10)  127 mg/dL (10-29-24 @ 01:59)  190 mg/dL (10-28-24 @ 21:19)  276 mg/dL (10-28-24 @ 16:59)  129 mg/dL (10-28-24 @ 12:54)      eMAR:atorvastatin   40 milliGRAM(s) Oral (10-28-24 @ 21:29)    insulin glargine Injectable (LANTUS)   10 Unit(s) SubCutaneous (10-28-24 @ 21:29)    insulin lispro (ADMELOG) corrective regimen sliding scale   3 Unit(s) SubCutaneous (10-28-24 @ 17:10)

## 2024-10-29 NOTE — PROGRESS NOTE ADULT - NSPROGADDITIONALINFOA_GEN_ALL_CORE
Contact via Microsoft Teams during business hours  To reach covering provider access AMION via sunrise tools  For Urgent matters/after-hours/weekends/holidays please page endocrine fellow on call   For nonurgent matters please email [email or web address]    Please note that this patient may be followed by different provider tomorrow.  Notify endocrine 24 hours prior to discharge for final recommendations
Contact via Microsoft Teams during business hours  To reach covering provider access AMION via sunrise tools  For Urgent matters/after-hours/weekends/holidays please page endocrine fellow on call   For nonurgent matters please email [email or web address]    Please note that this patient may be followed by different provider tomorrow.  Notify endocrine 24 hours prior to discharge for final recommendations
Contact via Microsoft Teams during business hours  To reach covering provider access AMION via sunrise tools  For Urgent matters/after-hours/weekends/holidays please page endocrine fellow on call   For nonurgent matters please email [email or web address]    Please note that this patient may be followed by different provider tomorrow.  Notify endocrine 24 hours prior to discharge for final recommendations

## 2024-10-29 NOTE — CONSULT NOTE ADULT - NS ATTEND AMEND GEN_ALL_CORE FT
Patient seen and examined. Chart with pertinent labs and imaging reviewed.  Patient is a nonhistorian.  UTI with sepsis syndrome  Concern of potential upper tract diseazse  Would treat empirically vs. potential pyelonephritis  R:B of contrast CT not favorable and unlikely to alter treatment pathway  Would complete course of bactrim enterally as above  I'll sign off at this time.   Thank you for the courtesy of this referral.    Christiano Menendez MD  Attending Physician  [facility]  Division of Infectous Diseases  [phone]

## 2024-10-29 NOTE — DISCHARGE NOTE PROVIDER - HOSPITAL COURSE
HPI:   84 year old female with past medical history of diabetes, dementia baseline A&Ox1, and hypertension who presents to the emergency department after son measured glucose at home as "high" on glucose monitor. Patient unable to provide history secondary to dementia. History provided by son. Son states patient had pointed at her head, but is unsure if that means she had a headache, but otherwise he had not noticed any other symptoms including vomiting, diarrhea, fevers, or chills. Son states that after glucose was measured as high, he called patient's PCP who said to take 2 more glipizide which he gave about 2-3 hours prior to arrival for a total fo 15mg glipizide today. (26 Oct 2024 10:49)    Hospital Course:      84 year old female with past medical history of diabetes, dementia baseline A&Ox1, and hypertension who presents to the emergency department after son measured glucose at home as "high" on glucose monitor. Patient unable to provide history secondary to dementia. History provided by son. Son states patient had pointed at her head, but is unsure if that means she had a headache, but otherwise he had not noticed any other symptoms including vomiting, diarrhea, fevers, or chills. Son states that after glucose was measured as high, he called patient's PCP who said to take 2 more glipizide which he gave about 2-3 hours prior to arrival for a total fo 15mg glipizide today.   Work up in ED revealed + ua with subsequent UCX ecoli pan sensi., and leukocytosis of 12, placed on ceftriaxone x 2 doses she has since remained off abx and ID consulted.  Afebrile,WNL today (was 12 on admission),Ucx >100,000 Ecoli,Lactate of 3.5 on admission now resolved 1.9.Ceftriaxone 1g  10/26-->10/27Currently off abx.ID consulted Overall pt  UCx  ecoli with 2 doses of ceftriaxone administered since admission then remained off abx, however seems improved.Can give bactrim DS 1 tab bid for total 5 days.  #uncontrolled DM  Endocrine consulted for hyperglycemia (600s, BHB 0.2). Patient off insulin gtt as of 10/26 AM and was started on Lantus 10u QAM. Patient is eating well, on pureed diet. Needs full assist- RN said she ate full breakfast this am after being  hypoglycemic.  Endocrine will closely monitor BG and adjust insulin as needed for BG goal 100-200mg/dL inpatient given age. Discussed discharge plan with family member. A1c: 12.4%   --Outpatient regimen: Glipizide 5mg daily - PCP said to take 2 more glipizide which son gave about 2-3 hours prior to arrival for a total of 15mg glipizide before going to ED.   --Endocrinologist: Dr. Sophia Wilson – last seen in 2016   As per ENDO D/C recomm as mentioned below  - Due to patients age and better control of BG while inpatient will send patient home on orals only.  -Patient/family should continue to check BG before each meal and at bedtime.   -Stop Glipizide 5mg daily  -start Tradjenta 5mg daily  -start repaglinide 0.5mg with each meal, hold if Blood glucose is less than 150mg/dl.  -Contact PCP/endocrinology if BG less than 70 X1, or greater 400 X1 or if persistently greater than 250mg/dl,    may need to start basal insulin.    - DM management supplies (glucometer, lancets, strips, alcohol pads) send to [facility].  - OUT-PATIENT FOLLOW UP: Patient should follow up with Endocrinologist Dr. Sophia Wilson MD or can follow up with our Endocrinology office [phone]) at [address] [place] [address] if more convenient.   - Recommend routine Opthalmology and Podiatry follow up.       ************Incomplete        Important Medication Changes and Reason:    Active or Pending Issues Requiring Follow-up:OUT-PATIENT FOLLOW UP: Patient should follow up with Endocrinologist Dr. Sophia Wilson MD or can follow up with our Endocrinology office [phone]) at [address] [place] [address] if more convenient.   - Recommend routine Opthalmology and Podiatry follow up.     Advanced Directives:   [ x] Full code  [ ] DNR  [ ] Hospice    Discharge Diagnoses:  Ecoli UTI         HPI:   84 year old female with past medical history of diabetes, dementia baseline A&Ox1, and hypertension who presents to the emergency department after son measured glucose at home as "high" on glucose monitor. Patient unable to provide history secondary to dementia. History provided by son. Son states patient had pointed at her head, but is unsure if that means she had a headache, but otherwise he had not noticed any other symptoms including vomiting, diarrhea, fevers, or chills. Son states that after glucose was measured as high, he called patient's PCP who said to take 2 more glipizide which he gave about 2-3 hours prior to arrival for a total fo 15mg glipizide today. (26 Oct 2024 10:49)    Hospital Course:      84 year old female with past medical history of diabetes, dementia baseline A&Ox1, and hypertension who presents to the emergency department after son measured glucose at home as "high" on glucose monitor. Patient unable to provide history secondary to dementia. History provided by son. Son states patient had pointed at her head, but is unsure if that means she had a headache, but otherwise he had not noticed any other symptoms including vomiting, diarrhea, fevers, or chills. Son states that after glucose was measured as high, he called patient's PCP who said to take 2 more glipizide which he gave about 2-3 hours prior to arrival for a total fo 15mg glipizide today.   Work up in ED revealed + ua with subsequent UCX ecoli pan sensi., and leukocytosis of 12, placed on ceftriaxone x 2 doses she has since remained off abx and ID consulted.  Afebrile,WNL today (was 12 on admission),Ucx >100,000 Ecoli,Lactate of 3.5 on admission now resolved 1.9.Ceftriaxone 1g  10/26-->10/27Currently off abx.ID consulted Overall pt  UCx  ecoli with 2 doses of ceftriaxone administered since admission then remained off abx, however seems improved.Can give bactrim DS 1 tab bid for total 5 days.  #uncontrolled DM  Endocrine consulted for hyperglycemia (600s, BHB 0.2). Patient off insulin gtt as of 10/26 AM and was started on Lantus 10u QAM. Patient is eating well, on pureed diet. Needs full assist- RN said she ate full breakfast this am after being  hypoglycemic.  Endocrine will closely monitor BG and adjust insulin as needed for BG goal 100-200mg/dL inpatient given age. Discussed discharge plan with family member. A1c: 12.4%   --Outpatient regimen: Glipizide 5mg daily - PCP said to take 2 more glipizide which son gave about 2-3 hours prior to arrival for a total of 15mg glipizide before going to ED.   --Endocrinologist: Dr. Sophia Wilson – last seen in 2016   As per ENDO D/C recomm as mentioned below  - Due to patients age and better control of BG while inpatient will send patient home on orals only.  -Patient/family should continue to check BG before each meal and at bedtime.   -Stop Glipizide 5mg daily  -start Tradjenta 5mg daily  -start repaglinide 0.5mg with each meal, hold if Blood glucose is less than 150mg/dl.  -Contact PCP/endocrinology if BG less than 70 X1, or greater 400 X1 or if persistently greater than 250mg/dl,    may need to start basal insulin.    - DM management supplies (glucometer, lancets, strips, alcohol pads) send to [facility].  - OUT-PATIENT FOLLOW UP: Patient should follow up with Endocrinologist Dr. Sophia Wilson MD or can follow up with our Endocrinology office [phone]) at [address] [place] [address] if more convenient.   - Recommend routine Opthalmology and Podiatry follow up.       Important Medication Changes and Reason:    Active or Pending Issues Requiring Follow-up:OUT-PATIENT FOLLOW UP: Patient should follow up with Endocrinologist Dr. Sophia Wilson MD or can follow up with our Endocrinology office [phone]) at [address] [place] [address] if more convenient.   - Recommend routine Opthalmology and Podiatry follow up.     Advanced Directives:   [ x] Full code  [ ] DNR  [ ] Hospice    Discharge Diagnoses:  Ecoli UTI: s/p abx CTX and Bactrim DS   Uncontrolled DM          HPI:   84 year old female with past medical history of diabetes, dementia baseline A&Ox1, and hypertension who presents to the emergency department after son measured glucose at home as "high" on glucose monitor. Patient unable to provide history secondary to dementia. History provided by son. Son states patient had pointed at her head, but is unsure if that means she had a headache, but otherwise he had not noticed any other symptoms including vomiting, diarrhea, fevers, or chills. Son states that after glucose was measured as high, he called patient's PCP who said to take 2 more glipizide which he gave about 2-3 hours prior to arrival for a total fo 15mg glipizide today. (26 Oct 2024 10:49)    Hospital Course:      84 year old female with past medical history of diabetes, dementia baseline A&Ox1, and hypertension who presents to the emergency department after son measured glucose at home as "high" on glucose monitor. Patient unable to provide history secondary to dementia. History provided by son. Son states patient had pointed at her head, but is unsure if that means she had a headache, but otherwise he had not noticed any other symptoms including vomiting, diarrhea, fevers, or chills. Son states that after glucose was measured as high, he called patient's PCP who said to take 2 more glipizide which he gave about 2-3 hours prior to arrival for a total fo 15mg glipizide today.   Work up in ED revealed + ua with subsequent UCX ecoli pan sensi., and leukocytosis of 12, placed on ceftriaxone x 2 doses she has since remained off abx and ID consulted.  Afebrile,WNL today (was 12 on admission),Ucx >100,000 Ecoli,Lactate of 3.5 on admission now resolved 1.9.Ceftriaxone 1g  10/26-->10/27Currently off abx.ID consulted Overall pt  UCx  ecoli with 2 doses of ceftriaxone administered since admission then remained off abx, however seems improved.Can give bactrim DS 1 tab bid for total 5 days.  #uncontrolled DM  Endocrine consulted for hyperglycemia (600s, BHB 0.2). Patient off insulin gtt as of 10/26 AM and was started on Lantus 10u QAM. Patient is eating well, on pureed diet. Needs full assist- RN said she ate full breakfast this am after being  hypoglycemic.  Endocrine will closely monitor BG and adjust insulin as needed for BG goal 100-200mg/dL inpatient given age. Discussed discharge plan with family member. A1c: 12.4%   --Outpatient regimen: Glipizide 5mg daily - PCP said to take 2 more glipizide which son gave about 2-3 hours prior to arrival for a total of 15mg glipizide before going to ED.   --Endocrinologist: Dr. Sophia Wilson – last seen in 2016   As per ENDO D/C recomm as mentioned below  - Due to patients age and better control of BG while inpatient will send patient home on orals only.  -Patient/family should continue to check BG before each meal and at bedtime.   -Stop Glipizide 5mg daily  -start Tradjenta 5mg daily  -start repaglinide 0.5mg with each meal, hold if Blood glucose is less than 150mg/dl.  -Contact PCP/endocrinology if BG less than 70 X1, or greater 400 X1 or if persistently greater than 250mg/dl,    may need to start basal insulin.    - DM management supplies (glucometer, lancets, strips, alcohol pads) send to [facility].  - OUT-PATIENT FOLLOW UP: Patient should follow up with Endocrinologist Dr. Sophia Wilson MD or can follow up with our Endocrinology office [phone]) at [address] [place] [address] if more convenient.   - Recommend routine Opthalmology and Podiatry follow up.       Important Medication Changes and Reason:  Add Bactrim  Discontinue Glipizide, start Tradjenta and repaglinide     Active or Pending Issues Requiring Follow-up:OUT-PATIENT FOLLOW UP: Patient should follow up with Endocrinologist Dr. Sophia Wilson MD or can follow up with our Endocrinology office [phone]) at [address] [place] [address] if more convenient.   - Recommend routine Opthalmology and Podiatry follow up.     Advanced Directives:   [ x] Full code  [ ] DNR  [ ] Hospice    Discharge Diagnoses:  Ecoli UTI: s/p abx CTX and Bactrim DS   Uncontrolled DM     Case discussed with attending and pt is cleared for discharge. Diagnoses explained to patient and medications send to [facility]. Pt will follow up with Endocrinology and pcp

## 2024-10-29 NOTE — DISCHARGE NOTE PROVIDER - NSDCCPCAREPLAN_GEN_ALL_CORE_FT
PRINCIPAL DISCHARGE DIAGNOSIS  Diagnosis: Acute UTI  Assessment and Plan of Treatment: Needs to complete 5 day course of Bactrim for Ecoli UTI.      SECONDARY DISCHARGE DIAGNOSES  Diagnosis: Uncontrolled diabetes mellitus with hyperglycemia  Assessment and Plan of Treatment: Due to patients age and better control of BG while inpatient will send patient home on orals only.  -Patient/family should continue to check BG before each meal and at bedtime.   -Stop Glipizide 5mg daily  -start Tradjenta 5mg daily  -start repaglinide 0.5mg with each meal, hold if Blood glucose is less than 150mg/dl.  -Contact PCP/endocrinology if BG less than 70 X1, or greater 400 X1 or if persistently greater than 250mg/dl,    may need to start basal insulin.    - DM management supplies (glucometer, lancets, strips, alcohol pads) send to [facility].  - OUT-PATIENT FOLLOW UP: Patient should follow up with Endocrinologist Dr. Sophia Wilson MD or can follow up with our Endocrinology office [phone]) at [address] [place] [id number] if more convenient.   - Recommend routine Opthalmology and Podiatry follow up.     PRINCIPAL DISCHARGE DIAGNOSIS  Diagnosis: Acute UTI  Assessment and Plan of Treatment: Needs to complete 5 day course of Bactrim for Ecoli UTI.      SECONDARY DISCHARGE DIAGNOSES  Diagnosis: Uncontrolled diabetes mellitus with hyperglycemia  Assessment and Plan of Treatment: You are to continue medication for your diabetes. You should check your sugar before each meal and at bedtime. Stop your Glipizide and start Tradjenta. You will also start repaglinide with each meal, hold if Blood glucose is less than 150mg/dl. Contact your provider if your blood glucose level is less than 70 X1, or greater 400 X1 or if persistently greater than 250mg/dl, you might need to add insulin.   DM management supplies (glucometer, lancets, strips, alcohol pads) send to [facility].  - OUT-PATIENT FOLLOW UP: Patient should follow up with Endocrinologist Dr. Sophia Wilson MD or can follow up with our Endocrinology office [phone]) at [address] [place] [address] if more convenient.   - Recommend routine Opthalmology and Podiatry follow up.     PRINCIPAL DISCHARGE DIAGNOSIS  Diagnosis: Acute UTI  Assessment and Plan of Treatment: You were treated with intravenous antibiotics in the hospital and now non oral antibiotics. Needs to complete 5 day course of Bactrim for Ecoli UTI. Please be sure to complete all medication.   Urinary Tract Infection  A urinary tract infection (UTI) is an infection of any part of the urinary tract, which includes the kidneys, ureters, bladder, and urethra. Risk factors include ignoring your need to urinate, wiping back to front if female, being an uncircumcised male, and having diabetes or a weak immune system. Symptoms include frequent urination, pain or burning with urination, foul smelling urine, cloudy urine, pain in the lower abdomen, blood in the urine, and fever. If you were prescribed an antibiotic medicine, take it as told by your health care provider. Do not stop taking the antibiotic even if you start to feel better.  SEEK IMMEDIATE MEDICAL CARE IF YOU HAVE ANY OF THE FOLLOWING SYMPTOMS: severe back or abdominal pain, fever, inability to keep fluids or medicine down, dizziness/lightheadedness, or a change in mental status.      SECONDARY DISCHARGE DIAGNOSES  Diagnosis: Uncontrolled diabetes mellitus with hyperglycemia  Assessment and Plan of Treatment: You are to continue medication for your diabetes. You should check your sugar before each meal and at bedtime. Stop your Glipizide and start Tradjenta. You will also start repaglinide with each meal, hold if Blood glucose is less than 150mg/dl. Contact your provider if your blood glucose level is less than 70 X1, or greater 400 X1 or if persistently greater than 250mg/dl, you might need to add insulin.   DM management supplies (glucometer, lancets, strips, alcohol pads) send to [facility].  - OUT-PATIENT FOLLOW UP: Patient should follow up with Endocrinologist Dr. Sophia Wilson MD or can follow up with our Endocrinology office [phone]) at [address] [place] [address] if more convenient.   - Recommend routine Opthalmology and Podiatry follow up.

## 2024-10-29 NOTE — PROGRESS NOTE ADULT - NUTRITIONAL ASSESSMENT
Diet, Consistent Carbohydrate/No Snacks:   Pureed (PUREED) (10-26-24 @ 19:02) [Active]

## 2024-10-30 ENCOUNTER — TRANSCRIPTION ENCOUNTER (OUTPATIENT)
Age: 84
End: 2024-10-30

## 2024-10-30 VITALS
OXYGEN SATURATION: 98 % | SYSTOLIC BLOOD PRESSURE: 140 MMHG | RESPIRATION RATE: 18 BRPM | HEART RATE: 70 BPM | DIASTOLIC BLOOD PRESSURE: 65 MMHG | TEMPERATURE: 99 F

## 2024-10-30 LAB
ALBUMIN SERPL ELPH-MCNC: 2.8 G/DL — LOW (ref 3.3–5)
ALP SERPL-CCNC: 110 U/L — SIGNIFICANT CHANGE UP (ref 40–120)
ALT FLD-CCNC: 43 U/L — SIGNIFICANT CHANGE UP (ref 10–45)
ANION GAP SERPL CALC-SCNC: 13 MMOL/L — SIGNIFICANT CHANGE UP (ref 5–17)
AST SERPL-CCNC: 47 U/L — HIGH (ref 10–40)
BASOPHILS # BLD AUTO: 0.03 K/UL — SIGNIFICANT CHANGE UP (ref 0–0.2)
BASOPHILS NFR BLD AUTO: 0.3 % — SIGNIFICANT CHANGE UP (ref 0–2)
BILIRUB DIRECT SERPL-MCNC: <0.1 MG/DL — SIGNIFICANT CHANGE UP (ref 0–0.3)
BILIRUB INDIRECT FLD-MCNC: >0.2 MG/DL — SIGNIFICANT CHANGE UP (ref 0.2–1)
BILIRUB SERPL-MCNC: 0.3 MG/DL — SIGNIFICANT CHANGE UP (ref 0.2–1.2)
BUN SERPL-MCNC: 14 MG/DL — SIGNIFICANT CHANGE UP (ref 7–23)
CALCIUM SERPL-MCNC: 8.9 MG/DL — SIGNIFICANT CHANGE UP (ref 8.4–10.5)
CHLORIDE SERPL-SCNC: 104 MMOL/L — SIGNIFICANT CHANGE UP (ref 96–108)
CO2 SERPL-SCNC: 22 MMOL/L — SIGNIFICANT CHANGE UP (ref 22–31)
CREAT SERPL-MCNC: 0.92 MG/DL — SIGNIFICANT CHANGE UP (ref 0.5–1.3)
EGFR: 62 ML/MIN/1.73M2 — SIGNIFICANT CHANGE UP
EOSINOPHIL # BLD AUTO: 0.24 K/UL — SIGNIFICANT CHANGE UP (ref 0–0.5)
EOSINOPHIL NFR BLD AUTO: 2.8 % — SIGNIFICANT CHANGE UP (ref 0–6)
GLUCOSE BLDC GLUCOMTR-MCNC: 121 MG/DL — HIGH (ref 70–99)
GLUCOSE BLDC GLUCOMTR-MCNC: 144 MG/DL — HIGH (ref 70–99)
GLUCOSE BLDC GLUCOMTR-MCNC: 165 MG/DL — HIGH (ref 70–99)
GLUCOSE BLDC GLUCOMTR-MCNC: 193 MG/DL — HIGH (ref 70–99)
GLUCOSE SERPL-MCNC: 141 MG/DL — HIGH (ref 70–99)
HCT VFR BLD CALC: 40.7 % — SIGNIFICANT CHANGE UP (ref 34.5–45)
HGB BLD-MCNC: 12.9 G/DL — SIGNIFICANT CHANGE UP (ref 11.5–15.5)
IMM GRANULOCYTES NFR BLD AUTO: 0.6 % — SIGNIFICANT CHANGE UP (ref 0–0.9)
LYMPHOCYTES # BLD AUTO: 2.29 K/UL — SIGNIFICANT CHANGE UP (ref 1–3.3)
LYMPHOCYTES # BLD AUTO: 26.4 % — SIGNIFICANT CHANGE UP (ref 13–44)
MCHC RBC-ENTMCNC: 29.3 PG — SIGNIFICANT CHANGE UP (ref 27–34)
MCHC RBC-ENTMCNC: 31.7 G/DL — LOW (ref 32–36)
MCV RBC AUTO: 92.3 FL — SIGNIFICANT CHANGE UP (ref 80–100)
MONOCYTES # BLD AUTO: 0.65 K/UL — SIGNIFICANT CHANGE UP (ref 0–0.9)
MONOCYTES NFR BLD AUTO: 7.5 % — SIGNIFICANT CHANGE UP (ref 2–14)
NEUTROPHILS # BLD AUTO: 5.41 K/UL — SIGNIFICANT CHANGE UP (ref 1.8–7.4)
NEUTROPHILS NFR BLD AUTO: 62.4 % — SIGNIFICANT CHANGE UP (ref 43–77)
NRBC # BLD: 0 /100 WBCS — SIGNIFICANT CHANGE UP (ref 0–0)
PLATELET # BLD AUTO: 139 K/UL — LOW (ref 150–400)
POTASSIUM SERPL-MCNC: 3.8 MMOL/L — SIGNIFICANT CHANGE UP (ref 3.5–5.3)
POTASSIUM SERPL-SCNC: 3.8 MMOL/L — SIGNIFICANT CHANGE UP (ref 3.5–5.3)
PROT SERPL-MCNC: 6.2 G/DL — SIGNIFICANT CHANGE UP (ref 6–8.3)
RBC # BLD: 4.41 M/UL — SIGNIFICANT CHANGE UP (ref 3.8–5.2)
RBC # FLD: 12.5 % — SIGNIFICANT CHANGE UP (ref 10.3–14.5)
SODIUM SERPL-SCNC: 139 MMOL/L — SIGNIFICANT CHANGE UP (ref 135–145)
WBC # BLD: 8.67 K/UL — SIGNIFICANT CHANGE UP (ref 3.8–10.5)
WBC # FLD AUTO: 8.67 K/UL — SIGNIFICANT CHANGE UP (ref 3.8–10.5)

## 2024-10-30 RX ORDER — SULFAMETHOXAZOLE/TRIMETHOPRIM 800-160 MG
1 TABLET ORAL
Qty: 10 | Refills: 0
Start: 2024-10-30 | End: 2024-11-03

## 2024-10-30 RX ORDER — LINAGLIPTIN 5 MG/1
1 TABLET, FILM COATED ORAL
Qty: 30 | Refills: 0
Start: 2024-10-30 | End: 2024-11-28

## 2024-10-30 RX ORDER — FLUCONAZOLE, SODIUM CHLORIDE 2 MG/ML
1 INJECTION INTRAVENOUS
Qty: 6 | Refills: 0
Start: 2024-10-30 | End: 2024-11-04

## 2024-10-30 RX ORDER — REPAGLINIDE 2 MG/1
1 TABLET ORAL
Qty: 90 | Refills: 0
Start: 2024-10-30 | End: 2024-11-28

## 2024-10-30 RX ORDER — GLIPIZIDE 5 MG
1 TABLET ORAL
Refills: 0 | DISCHARGE

## 2024-10-30 RX ORDER — BENZOCAINE .06; .7 ML/1; ML/1
0 SWAB TOPICAL
Qty: 100 | Refills: 1
Start: 2024-10-30

## 2024-10-30 RX ADMIN — ESCITALOPRAM 10 MILLIGRAM(S): 10 TABLET, FILM COATED ORAL at 12:03

## 2024-10-30 RX ADMIN — Medication 1: at 12:04

## 2024-10-30 RX ADMIN — Medication 40 MILLIGRAM(S): at 05:57

## 2024-10-30 RX ADMIN — Medication 1 TABLET(S): at 05:58

## 2024-10-30 RX ADMIN — HEPARIN SODIUM 5000 UNIT(S): 10000 INJECTION INTRAVENOUS; SUBCUTANEOUS at 05:58

## 2024-10-30 RX ADMIN — NYSTATIN 1 APPLICATION(S): 100000 POWDER TOPICAL at 05:58

## 2024-10-30 RX ADMIN — Medication 500 MILLIGRAM(S): at 12:03

## 2024-10-30 RX ADMIN — FLUCONAZOLE, SODIUM CHLORIDE 100 MILLIGRAM(S): 2 INJECTION INTRAVENOUS at 12:03

## 2024-10-30 RX ADMIN — Medication 1 TABLET(S): at 12:03

## 2024-10-30 RX ADMIN — MEMANTINE HYDROCHLORIDE 10 MILLIGRAM(S): 21 CAPSULE, EXTENDED RELEASE ORAL at 05:58

## 2024-10-30 RX ADMIN — Medication 10 MILLIGRAM(S): at 05:57

## 2024-10-30 RX ADMIN — Medication 325 MILLIGRAM(S): at 12:03

## 2024-10-30 NOTE — PROGRESS NOTE ADULT - PROVIDER SPECIALTY LIST ADULT
Internal Medicine
Endocrinology

## 2024-10-30 NOTE — DISCHARGE NOTE NURSING/CASE MANAGEMENT/SOCIAL WORK - FINANCIAL ASSISTANCE
[facility] provides services at a reduced cost to those who are determined to be eligible through [facility]’s financial assistance program. Information regarding [facility]’s financial assistance program can be found by going to [email or web address] or by calling [phone].

## 2024-10-30 NOTE — PROGRESS NOTE ADULT - ASSESSMENT
84 f with    Diabetes Mellitus poorly controlled  - BS control  - ADA diet   - Endocrine evaluation noted    UTI  - UC  - Ceftriaxone    Dehydration  - gentle IVF    HTN  - control    Decubitus  - wound care  - turn    functional quadriplegia   - supportive care     Dementia  - TSH, B12  - CT head  - Bedbound    Hx of A Fib  - now in SR  - No AC per PMD   - rate control with Sotalol    DVT prophylaxis    Noe Lacey MD phone [phone]   
84 f with    Diabetes Mellitus poorly controlled  - BS control  - ADA diet   - Endocrine follow    UTI  - Ceftriaxone discontinued 2 to rash  - Bactrim DS bid for 5 days.  - ID evaluation noted    Dehydration  - gentle IVF    HTN  - control    Decubitus  - wound care  - turn    Rash fungal  - Nystatin  - Dermatology evaluation noted    Functional quadriplegia   - supportive care     Dementia  - TSH, B12 noted   - CT head  - Bedbound    Hx of A Fib  - now in SR  - No AC per PMD   - rate control with Sotalol    DVT prophylaxis    DC home. Follow with PMD/ Wound in 3-4 days.     d/w ACP      Noe Lacey MD phone [phone]   
84 f with    Diabetes Mellitus poorly controlled  - BS control  - ADA diet   - Endocrine follow    UTI  - UC  - Ceftriaxone discontinued 2 to rash  - ID evaluation called    Dehydration  - gentle IVF    HTN  - control    Decubitus  - wound care  - turn    Rash  - Dermatology evaluation     Functional quadriplegia   - supportive care     Dementia  - TSH, B12 noted   - CT head  - Bedbound    Hx of A Fib  - now in SR  - No AC per PMD   - rate control with Sotalol    DVT prophylaxis    Noe Lacey MD phone [phone]   
 84F hx T2DM, dementia baseline A&Ox1, and hypertension presents for high glucose levels on glucometer and admitted for a UTI.     Endocrine consulted for hyperglycemia (600s, BHB 0.2). Patient off insulin gtt as of 10/26 AM and was started on Lantus 10u QAM. Patient is eating well, on pureed diet. Needs full assist- RN said she ate full breakfast this am. FBG stable, no hypoglycemia. Daughter states mother has a aid that cares for her and her/siblings go everyday to see her. They are willing to learn insulin pen injections. They already check her BG at home. Endocrine will closely monitor BG and adjust insulin as needed for BG goal 100-200mg/dL inpatient given age.       #T2DM   --A1c: 12.4%   --Outpatient regimen: Glipizide 5mg daily - PCP said to take 2 more glipizide which son gave about 2-3 hours prior to arrival for a total of 15mg glipizide before going to ED.   --Endocrinologist: Dr. Sophia Wilson – last seen in 2016           
84 f with    Diabetes Mellitus poorly controlled  - BS control  - ADA diet   - Endocrine follow    UTI  - UC  - Ceftriaxone discontinued 2 to rash  - Bactrim DS bid for 5 days.  - ID evaluation noted    Dehydration  - gentle IVF    HTN  - control    Decubitus  - wound care  - turn    Rash fungal  - Nystatin  - Dermatology evaluation noted    Functional quadriplegia   - supportive care     Dementia  - TSH, B12 noted   - CT head  - Bedbound    Hx of A Fib  - now in SR  - No AC per PMD   - rate control with Sotalol    DVT prophylaxis    DCP home.     Noe Lacey MD phone [phone]   
 84F hx T2DM, dementia baseline A&Ox1, and hypertension presents for high glucose levels on glucometer and admitted for a UTI.     Endocrine consulted for hyperglycemia (600s, BHB 0.2). Patient off insulin gtt as of 10/26 AM and was started on Lantus 10u QAM. Patient is eating well, on pureed diet. Needs full assist- RN said she ate full breakfast this am after being  hypoglycemic.  Endocrine will closely monitor BG and adjust insulin as needed for BG goal 100-200mg/dL inpatient given age. Discussed discharge plan with family member. Will try to send patient on orals only.       #T2DM   --A1c: 12.4%   --Outpatient regimen: Glipizide 5mg daily - PCP said to take 2 more glipizide which son gave about 2-3 hours prior to arrival for a total of 15mg glipizide before going to ED.   --Endocrinologist: Dr. Sophia Wilson – last seen in 2016           
 84F hx T2DM, dementia baseline A&Ox1, and hypertension presents for high glucose levels on glucometer and admitted for a UTI. Endocrine consulted for hyperglycemia (600s, BHB 0.2). Patient off insulin gtt as of 10/26 AM and was started on Lantus 10u QAM. Patient is doing well, just started on PO diet, pureed diet. Needs full assist- RN said she ate pudding and oatmeal this am. Tolerating POs well. FBG stable, no hypoglycemia. Noted Lantus given twice last night- given risk for hypoglycemia, plan for BGs to be checked q1h until 1300 and patient can eat whatever she wants during the day- RN aware, order placed, and discussed with . Will change Lantus 10u from QAM to QHS for now. Will hold off on nutritional insulin for now as well unless BGs >200mg/dL consistently. Daughter at bedside, wondering if patient will go home on insulin. Discussed A1c >11% so she most likely will go home on once a day basal insulin but depends on PO intake and BG/insulin needs while inpatient. Daughter states mother has a aid that cares for her and her/siblings go everyday to see her. They are willing to learn insulin pen injections. They already check her BG at home. Endocrine will closely monitor BG and adjust insulin as needed for BG goal 100-200mg/dL inpatient given age.       #T2DM   --A1c: 12.4%   --Outpatient regimen: Glipizide 5mg daily - PCP said to take 2 more glipizide which son gave about 2-3 hours prior to arrival for a total of 15mg glipizide before going to ED.   --Endocrinologist: Dr. Sophia Wilson – last seen in 2016

## 2024-10-30 NOTE — DISCHARGE NOTE NURSING/CASE MANAGEMENT/SOCIAL WORK - NSDCPEFALRISK_GEN_ALL_CORE
For information on Fall & Injury Prevention, visit: [email or web address] OR  [email or web address] OR  https://www.cdc.gov/steadi/patient.html Patient with one or more new problems requiring additional work-up/treatment.

## 2024-10-30 NOTE — DISCHARGE NOTE NURSING/CASE MANAGEMENT/SOCIAL WORK - NSDCFUADDAPPT_GEN_ALL_CORE_FT
APPTS ARE READY TO BE MADE: [ X] YES    Best Family or Patient Contact (if needed):    Additional Information about above appointments (if needed):    1: - OUT-PATIENT FOLLOW UP: Patient should follow up with Endocrinologist Dr. Sophia Wilson MD or can follow up with our Endocrinology office [phone]) at [address] [place] [phone] if more convenient.   2:   3:     Other comments or requests:

## 2024-10-30 NOTE — DISCHARGE NOTE NURSING/CASE MANAGEMENT/SOCIAL WORK - PATIENT PORTAL LINK FT
You can access the FollowMyHealth Patient Portal offered by [facility] by registering at the following website: [email or web address]. By joining [employer]’s FollowMyHealth portal, you will also be able to view your health information using other applications (apps) compatible with our system.

## 2024-10-30 NOTE — PROGRESS NOTE ADULT - SUBJECTIVE AND OBJECTIVE BOX
Patient is a 84y old  Female who presents with a chief complaint of     SUBJECTIVE / OVERNIGHT EVENTS: Comfortable without new complaints.   Review of Systems  chest pain no  palpitations no  sob no  nausea no  headache no    MEDICATIONS  (STANDING):  aspirin 325 milliGRAM(s) Oral daily  atorvastatin 40 milliGRAM(s) Oral at bedtime  cefTRIAXone   IVPB 1000 milliGRAM(s) IV Intermittent every 24 hours  dextrose 5%. 1000 milliLiter(s) (50 mL/Hr) IV Continuous <Continuous>  dextrose 5%. 1000 milliLiter(s) (100 mL/Hr) IV Continuous <Continuous>  dextrose 50% Injectable 25 Gram(s) IV Push once  dextrose 50% Injectable 25 Gram(s) IV Push once  dextrose 50% Injectable 12.5 Gram(s) IV Push once  dextrose Oral Gel 15 Gram(s) Oral once  enalapril 10 milliGRAM(s) Oral two times a day  escitalopram 10 milliGRAM(s) Oral daily  glucagon  Injectable 1 milliGRAM(s) IntraMuscular once  heparin   Injectable 5000 Unit(s) SubCutaneous every 8 hours  insulin glargine Injectable (LANTUS) 10 Unit(s) SubCutaneous at bedtime  insulin lispro (ADMELOG) corrective regimen sliding scale   SubCutaneous three times a day before meals  insulin lispro (ADMELOG) corrective regimen sliding scale   SubCutaneous <User Schedule>  memantine 10 milliGRAM(s) Oral two times a day  QUEtiapine 25 milliGRAM(s) Oral at bedtime  sodium chloride 0.9%. 1000 milliLiter(s) (50 mL/Hr) IV Continuous <Continuous>  sotalol. 40 milliGRAM(s) Oral every 12 hours    MEDICATIONS  (PRN):  acetaminophen     Tablet .. 650 milliGRAM(s) Oral every 6 hours PRN Temp greater or equal to 38.5C (101.3F), Mild Pain (1 - 3)      Vital Signs Last 24 Hrs  T(C): 37.1 (27 Oct 2024 09:52), Max: 37.3 (27 Oct 2024 05:21)  T(F): 98.7 (27 Oct 2024 09:52), Max: 99.2 (27 Oct 2024 05:21)  HR: 65 (27 Oct 2024 09:52) (59 - 69)  BP: 110/74 (27 Oct 2024 09:52) (103/62 - 128/76)  BP(mean): --  RR: 18 (27 Oct 2024 09:52) (18 - 18)  SpO2: 97% (27 Oct 2024 09:52) (96% - 99%)    Parameters below as of 27 Oct 2024 09:52  Patient On (Oxygen Delivery Method): room air        PHYSICAL EXAM:  GENERAL: NAD  HEAD:  Atraumatic, Normocephalic  EYES: EOMI, PERRLA, conjunctiva and sclera clear  NECK: Supple, No JVD  CHEST/LUNG: Clear to auscultation bilaterally; No wheeze  HEART: Regular rate and rhythm; No murmurs, rubs, or gallops  ABDOMEN: Soft, Nontender, Nondistended; Bowel sounds present  EXTREMITIES:  2+ Peripheral Pulses, No clubbing, cyanosis, or edema  PSYCH: AAOx1  NEUROLOGY: non-focal  SKIN: No rashes or lesions    LABS:                        12.3   9.57  )-----------( 156      ( 27 Oct 2024 06:59 )             39.4     10-27    146[H]  |  113[H]  |  42[H]  ----------------------------<  129[H]  3.6   |  21[L]  |  0.91    Ca    9.0      27 Oct 2024 07:03  Phos  4.4     10-26  Mg     3.0     10-26    TPro  6.9  /  Alb  3.4  /  TBili  0.5  /  DBili  x   /  AST  19  /  ALT  23  /  AlkPhos  113  10-26          Urinalysis Basic - ( 27 Oct 2024 07:03 )    Color: x / Appearance: x / SG: x / pH: x  Gluc: 129 mg/dL / Ketone: x  / Bili: x / Urobili: x   Blood: x / Protein: x / Nitrite: x   Leuk Esterase: x / RBC: x / WBC x   Sq Epi: x / Non Sq Epi: x / Bacteria: x        Culture - Urine (collected 26 Oct 2024 03:32)  Source: Clean Catch Clean Catch (Midstream)  Preliminary Report (27 Oct 2024 05:47):    >100,000 CFU/ml Escherichia coli        RADIOLOGY & ADDITIONAL TESTS:    Imaging Personally Reviewed:    Consultant(s) Notes Reviewed:      Care Discussed with Consultants/Other Providers:  
Patient is a 84y old  Female who presents with a chief complaint of Hyperglycemia, +UTI (29 Oct 2024 12:28)      SUBJECTIVE / OVERNIGHT EVENTS: Comfortable   Review of Systems  unobtainable     MEDICATIONS  (STANDING):  ascorbic acid 500 milliGRAM(s) Oral daily  aspirin 325 milliGRAM(s) Oral daily  atorvastatin 40 milliGRAM(s) Oral at bedtime  dextrose 5%. 1000 milliLiter(s) (100 mL/Hr) IV Continuous <Continuous>  dextrose 5%. 1000 milliLiter(s) (50 mL/Hr) IV Continuous <Continuous>  dextrose 50% Injectable 12.5 Gram(s) IV Push once  dextrose 50% Injectable 25 Gram(s) IV Push once  dextrose 50% Injectable 25 Gram(s) IV Push once  dextrose Oral Gel 15 Gram(s) Oral once  enalapril 10 milliGRAM(s) Oral two times a day  escitalopram 10 milliGRAM(s) Oral daily  fluconAZOLE   Tablet 250 milliGRAM(s) Oral once  glucagon  Injectable 1 milliGRAM(s) IntraMuscular once  heparin   Injectable 5000 Unit(s) SubCutaneous every 8 hours  insulin glargine Injectable (LANTUS) 5 Unit(s) SubCutaneous at bedtime  insulin lispro (ADMELOG) corrective regimen sliding scale   SubCutaneous three times a day before meals  insulin lispro (ADMELOG) corrective regimen sliding scale   SubCutaneous <User Schedule>  memantine 10 milliGRAM(s) Oral two times a day  multivitamin 1 Tablet(s) Oral daily  nystatin Powder 1 Application(s) Topical two times a day  QUEtiapine 25 milliGRAM(s) Oral at bedtime  sodium chloride 0.9%. 1000 milliLiter(s) (50 mL/Hr) IV Continuous <Continuous>  sotalol. 40 milliGRAM(s) Oral every 12 hours  trimethoprim  160 mG/sulfamethoxazole 800 mG 1 Tablet(s) Oral two times a day    MEDICATIONS  (PRN):  acetaminophen     Tablet .. 650 milliGRAM(s) Oral every 6 hours PRN Temp greater or equal to 38.5C (101.3F), Mild Pain (1 - 3)      Vital Signs Last 24 Hrs  T(C): 36.8 (29 Oct 2024 10:28), Max: 36.8 (29 Oct 2024 05:36)  T(F): 98.3 (29 Oct 2024 10:28), Max: 98.3 (29 Oct 2024 05:36)  HR: 69 (29 Oct 2024 10:28) (62 - 69)  BP: 120/72 (29 Oct 2024 10:28) (109/61 - 120/72)  BP(mean): --  RR: 18 (29 Oct 2024 10:28) (18 - 18)  SpO2: 98% (29 Oct 2024 10:28) (97% - 98%)    Parameters below as of 29 Oct 2024 10:28  Patient On (Oxygen Delivery Method): room air        PHYSICAL EXAM:  GENERAL: NAD, well-developed  HEAD:  Atraumatic, Normocephalic  EYES: EOMI, PERRLA, conjunctiva and sclera clear  NECK: Supple, No JVD  CHEST/LUNG: Clear to auscultation bilaterally; No wheeze  HEART: Regular rate and rhythm; No murmurs, rubs, or gallops  ABDOMEN: Soft, Nontender, Nondistended; Bowel sounds present  EXTREMITIES:  2+ Peripheral Pulses, No clubbing, cyanosis, or edema  PSYCH: AAOx3  NEUROLOGY: non-focal  SKIN: No rashes or lesions    LABS:                        12.0   8.27  )-----------( 126      ( 29 Oct 2024 07:13 )             38.4     10-29    142  |  110[H]  |  26[H]  ----------------------------<  84  3.6   |  20[L]  |  0.85    Ca    8.6      29 Oct 2024 07:09    TPro  5.5[L]  /  Alb  2.8[L]  /  TBili  0.3  /  DBili  x   /  AST  33  /  ALT  30  /  AlkPhos  89  10-29          Urinalysis Basic - ( 29 Oct 2024 07:09 )    Color: x / Appearance: x / SG: x / pH: x  Gluc: 84 mg/dL / Ketone: x  / Bili: x / Urobili: x   Blood: x / Protein: x / Nitrite: x   Leuk Esterase: x / RBC: x / WBC x   Sq Epi: x / Non Sq Epi: x / Bacteria: x          RADIOLOGY & ADDITIONAL TESTS:    Imaging Personally Reviewed:    Consultant(s) Notes Reviewed:      Care Discussed with Consultants/Other Providers:  
Patient is a 84y old  Female who presents with a chief complaint of Hyperglycemia, +UTI (29 Oct 2024 12:28)      SUBJECTIVE / OVERNIGHT EVENTS: Comfortable   Review of Systems  unobtainable     MEDICATIONS  (STANDING):  ascorbic acid 500 milliGRAM(s) Oral daily  aspirin 325 milliGRAM(s) Oral daily  atorvastatin 40 milliGRAM(s) Oral at bedtime  dextrose 5%. 1000 milliLiter(s) (50 mL/Hr) IV Continuous <Continuous>  dextrose 5%. 1000 milliLiter(s) (100 mL/Hr) IV Continuous <Continuous>  dextrose 50% Injectable 25 Gram(s) IV Push once  dextrose 50% Injectable 25 Gram(s) IV Push once  dextrose 50% Injectable 12.5 Gram(s) IV Push once  dextrose Oral Gel 15 Gram(s) Oral once  enalapril 10 milliGRAM(s) Oral two times a day  escitalopram 10 milliGRAM(s) Oral daily  fluconAZOLE   Tablet 100 milliGRAM(s) Oral daily  glucagon  Injectable 1 milliGRAM(s) IntraMuscular once  heparin   Injectable 5000 Unit(s) SubCutaneous every 8 hours  insulin glargine Injectable (LANTUS) 5 Unit(s) SubCutaneous at bedtime  insulin lispro (ADMELOG) corrective regimen sliding scale   SubCutaneous three times a day before meals  insulin lispro (ADMELOG) corrective regimen sliding scale   SubCutaneous <User Schedule>  memantine 10 milliGRAM(s) Oral two times a day  multivitamin 1 Tablet(s) Oral daily  nystatin Powder 1 Application(s) Topical two times a day  QUEtiapine 25 milliGRAM(s) Oral at bedtime  sodium chloride 0.9%. 1000 milliLiter(s) (50 mL/Hr) IV Continuous <Continuous>  sotalol. 40 milliGRAM(s) Oral every 12 hours  trimethoprim  160 mG/sulfamethoxazole 800 mG 1 Tablet(s) Oral two times a day    MEDICATIONS  (PRN):  acetaminophen     Tablet .. 650 milliGRAM(s) Oral every 6 hours PRN Temp greater or equal to 38.5C (101.3F), Mild Pain (1 - 3)      Vital Signs Last 24 Hrs  T(C): 37 (30 Oct 2024 10:26), Max: 37.2 (29 Oct 2024 20:44)  T(F): 98.6 (30 Oct 2024 10:26), Max: 98.9 (29 Oct 2024 20:44)  HR: 70 (30 Oct 2024 10:26) (70 - 73)  BP: 140/65 (30 Oct 2024 10:26) (130/63 - 140/65)  BP(mean): --  RR: 18 (30 Oct 2024 10:26) (18 - 18)  SpO2: 98% (30 Oct 2024 10:26) (97% - 98%)    Parameters below as of 30 Oct 2024 10:26  Patient On (Oxygen Delivery Method): room air        PHYSICAL EXAM:  GENERAL: NAD   HEAD:  Atraumatic, Normocephalic  EYES: EOMI, PERRLA, conjunctiva and sclera clear  NECK: Supple, No JVD  CHEST/LUNG: Clear to auscultation bilaterally; No wheeze  HEART: Regular rate and rhythm; No murmurs, rubs, or gallops  ABDOMEN: Soft, Nontender, Nondistended; Bowel sounds present  EXTREMITIES:  2+ Peripheral Pulses, No clubbing, cyanosis, or edema  PSYCH: AAOx1  NEUROLOGY: non-focal, bedridden  SKIN: sacra decubitus and fungal rash    LABS:                        12.9   8.67  )-----------( 139      ( 30 Oct 2024 07:06 )             40.7     10-30    139  |  104  |  14  ----------------------------<  141[H]  3.8   |  22  |  0.92    Ca    8.9      30 Oct 2024 07:18    TPro  6.2  /  Alb  2.8[L]  /  TBili  0.3  /  DBili  <0.1  /  AST  47[H]  /  ALT  43  /  AlkPhos  110  10-30          Urinalysis Basic - ( 30 Oct 2024 07:18 )    Color: x / Appearance: x / SG: x / pH: x  Gluc: 141 mg/dL / Ketone: x  / Bili: x / Urobili: x   Blood: x / Protein: x / Nitrite: x   Leuk Esterase: x / RBC: x / WBC x   Sq Epi: x / Non Sq Epi: x / Bacteria: x          RADIOLOGY & ADDITIONAL TESTS:    Imaging Personally Reviewed:    Consultant(s) Notes Reviewed:      Care Discussed with Consultants/Other Providers:  
Patient is a 84y old  Female who presents with a chief complaint of Urinary tract infection    Chart reviewed, events noted. This is a "84 year old female with past medical history of diabetes, dementia baseline A&Ox1, and hypertension who presents to the emergency department after son measured glucose at home as "high" on glucose monitor. " (28 Oct 2024 13:43)      SUBJECTIVE / OVERNIGHT EVENTS: Comfortable   Review of Systems  unobtainable     MEDICATIONS  (STANDING):  ascorbic acid 500 milliGRAM(s) Oral daily  aspirin 325 milliGRAM(s) Oral daily  atorvastatin 40 milliGRAM(s) Oral at bedtime  dextrose 5%. 1000 milliLiter(s) (100 mL/Hr) IV Continuous <Continuous>  dextrose 5%. 1000 milliLiter(s) (50 mL/Hr) IV Continuous <Continuous>  dextrose 50% Injectable 12.5 Gram(s) IV Push once  dextrose 50% Injectable 25 Gram(s) IV Push once  dextrose 50% Injectable 25 Gram(s) IV Push once  dextrose Oral Gel 15 Gram(s) Oral once  enalapril 10 milliGRAM(s) Oral two times a day  escitalopram 10 milliGRAM(s) Oral daily  glucagon  Injectable 1 milliGRAM(s) IntraMuscular once  heparin   Injectable 5000 Unit(s) SubCutaneous every 8 hours  insulin glargine Injectable (LANTUS) 10 Unit(s) SubCutaneous at bedtime  insulin lispro (ADMELOG) corrective regimen sliding scale   SubCutaneous three times a day before meals  insulin lispro (ADMELOG) corrective regimen sliding scale   SubCutaneous <User Schedule>  memantine 10 milliGRAM(s) Oral two times a day  multivitamin 1 Tablet(s) Oral daily  nystatin Powder 1 Application(s) Topical two times a day  QUEtiapine 25 milliGRAM(s) Oral at bedtime  sodium chloride 0.9%. 1000 milliLiter(s) (50 mL/Hr) IV Continuous <Continuous>  sotalol. 40 milliGRAM(s) Oral every 12 hours    MEDICATIONS  (PRN):  acetaminophen     Tablet .. 650 milliGRAM(s) Oral every 6 hours PRN Temp greater or equal to 38.5C (101.3F), Mild Pain (1 - 3)      Vital Signs Last 24 Hrs  T(C): 36.3 (28 Oct 2024 12:29), Max: 36.8 (28 Oct 2024 05:17)  T(F): 97.4 (28 Oct 2024 12:29), Max: 98.2 (28 Oct 2024 05:17)  HR: 62 (28 Oct 2024 12:29) (62 - 82)  BP: 117/73 (28 Oct 2024 12:29) (95/54 - 117/73)  BP(mean): --  RR: 18 (28 Oct 2024 12:29) (18 - 18)  SpO2: 98% (28 Oct 2024 12:29) (98% - 99%)    Parameters below as of 28 Oct 2024 12:29  Patient On (Oxygen Delivery Method): room air        PHYSICAL EXAM:  GENERAL: NAD  HEAD:  Atraumatic, Normocephalic  EYES: EOMI, PERRLA, conjunctiva and sclera clear  NECK: Supple, No JVD  CHEST/LUNG: Clear to auscultation bilaterally; No wheeze  HEART: Regular rate and rhythm; No murmurs, rubs, or gallops  ABDOMEN: Soft, Nontender, Nondistended; Bowel sounds present  EXTREMITIES:  2+ Peripheral Pulses, No clubbing, cyanosis, or edema  PSYCH: AAOx1  NEUROLOGY: non-focal, bedridden  SKIN: sacral decubitus and rash    LABS:                        12.8   8.20  )-----------( 134      ( 28 Oct 2024 06:48 )             41.6     10-28    147[H]  |  114[H]  |  37[H]  ----------------------------<  120[H]  3.9   |  24  |  1.00    Ca    9.0      28 Oct 2024 06:48            Urinalysis Basic - ( 28 Oct 2024 06:48 )    Color: x / Appearance: x / SG: x / pH: x  Gluc: 120 mg/dL / Ketone: x  / Bili: x / Urobili: x   Blood: x / Protein: x / Nitrite: x   Leuk Esterase: x / RBC: x / WBC x   Sq Epi: x / Non Sq Epi: x / Bacteria: x        Culture - Urine (collected 26 Oct 2024 13:32)  Source: Clean Catch Clean Catch (Midstream)  Preliminary Report (28 Oct 2024 12:43):    50,000 - 99,000 CFU/mL Escherichia coli    <10,000 CFU/ml Normal Urogenital storm present    Culture - Urine (collected 26 Oct 2024 03:32)  Source: Clean Catch Clean Catch (Midstream)  Preliminary Report (27 Oct 2024 05:47):    >100,000 CFU/ml Escherichia coli        RADIOLOGY & ADDITIONAL TESTS:    Imaging Personally Reviewed:    Consultant(s) Notes Reviewed:      Care Discussed with Consultants/Other Providers:  
Chief Complaint: Diabetes Mellitus follow up    INTERVAL HX:  Patient seen at bedside with daughter present.  Patient is confused and unable to participate in exam.   Reports eating well, finishes 100% of food on each tray. BG over the last 24 hrs have been within goal range 100-180mg/dl. No hypoglycemia.     Review of Systems:  unable, appears comfortable.    Allergies    No Known Allergies    Intolerances      MEDICATIONS  (STANDING):  aspirin 325 milliGRAM(s) Oral daily  atorvastatin 40 milliGRAM(s) Oral at bedtime  dextrose 5%. 1000 milliLiter(s) (50 mL/Hr) IV Continuous <Continuous>  dextrose 5%. 1000 milliLiter(s) (100 mL/Hr) IV Continuous <Continuous>  dextrose 50% Injectable 12.5 Gram(s) IV Push once  dextrose 50% Injectable 25 Gram(s) IV Push once  dextrose 50% Injectable 25 Gram(s) IV Push once  dextrose Oral Gel 15 Gram(s) Oral once  enalapril 10 milliGRAM(s) Oral two times a day  escitalopram 10 milliGRAM(s) Oral daily  glucagon  Injectable 1 milliGRAM(s) IntraMuscular once  heparin   Injectable 5000 Unit(s) SubCutaneous every 8 hours  insulin glargine Injectable (LANTUS) 10 Unit(s) SubCutaneous at bedtime  insulin lispro (ADMELOG) corrective regimen sliding scale   SubCutaneous three times a day before meals  insulin lispro (ADMELOG) corrective regimen sliding scale   SubCutaneous <User Schedule>  memantine 10 milliGRAM(s) Oral two times a day  QUEtiapine 25 milliGRAM(s) Oral at bedtime  sodium chloride 0.9%. 1000 milliLiter(s) (50 mL/Hr) IV Continuous <Continuous>  sotalol. 40 milliGRAM(s) Oral every 12 hours      atorvastatin   40 milliGRAM(s) Oral (10-27-24 @ 21:23)    insulin glargine Injectable (LANTUS)   10 Unit(s) SubCutaneous (10-27-24 @ 21:26)    insulin lispro (ADMELOG) corrective regimen sliding scale   2 Unit(s) SubCutaneous (10-27-24 @ 17:31)        PHYSICAL EXAM:  VITALS: T(C): 36.3 (10-28-24 @ 12:29)  T(F): 97.4 (10-28-24 @ 12:29), Max: 98.9 (10-27-24 @ 13:04)  HR: 62 (10-28-24 @ 12:29) (62 - 93)  BP: 117/73 (10-28-24 @ 12:29) (92/58 - 117/73)  RR:  (18 - 18)  SpO2:  (98% - 99%)  Wt(kg): --  GENERAL: NAD  Respiratory: Respirations unlabored   Extremities: Warm, no edema  NEURO: Alert , confused, non verbal    LABS:  POCT Blood Glucose.: 129 mg/dL (10-28-24 @ 12:54)  POCT Blood Glucose.: 89 mg/dL (10-28-24 @ 08:26)  POCT Blood Glucose.: 108 mg/dL (10-28-24 @ 06:22)  POCT Blood Glucose.: 164 mg/dL (10-28-24 @ 01:00)  POCT Blood Glucose.: 194 mg/dL (10-27-24 @ 21:03)  POCT Blood Glucose.: 202 mg/dL (10-27-24 @ 17:12)  POCT Blood Glucose.: 262 mg/dL (10-27-24 @ 12:52)  POCT Blood Glucose.: 237 mg/dL (10-27-24 @ 11:43)  POCT Blood Glucose.: 273 mg/dL (10-27-24 @ 10:47)  POCT Blood Glucose.: 104 mg/dL (10-27-24 @ 09:41)  POCT Blood Glucose.: 118 mg/dL (10-27-24 @ 08:18)  POCT Blood Glucose.: 117 mg/dL (10-27-24 @ 05:43)  POCT Blood Glucose.: 137 mg/dL (10-27-24 @ 02:03)  POCT Blood Glucose.: 170 mg/dL (10-26-24 @ 22:29)  POCT Blood Glucose.: 227 mg/dL (10-26-24 @ 17:55)  POCT Blood Glucose.: 380 mg/dL (10-26-24 @ 13:02)  POCT Blood Glucose.: 372 mg/dL (10-26-24 @ 10:56)  POCT Blood Glucose.: 437 mg/dL (10-26-24 @ 08:54)  POCT Blood Glucose.: 594 mg/dL (10-25-24 @ 23:49)  POCT Blood Glucose.: >600 mg/dL (10-25-24 @ 23:48)                          12.8   8.20  )-----------( 134      ( 28 Oct 2024 06:48 )             41.6     10-28    147[H]  |  114[H]  |  37[H]  ----------------------------<  120[H]  3.9   |  24  |  1.00    Ca    9.0      28 Oct 2024 06:48      eGFR: 56 mL/min/1.73m2 (28 Oct 2024 06:48)      Thyroid Function Tests:  10-27 @ 07:03 TSH 0.80 FreeT4 -- T3 -- Anti TPO -- Anti Thyroglobulin Ab -- TSI --      A1C with Estimated Average Glucose Result: 12.4 % (10-26-24 @ 00:39)    Estimated Average Glucose: 309 mg/dL (10-26-24 @ 00:39)        Diet, Consistent Carbohydrate/No Snacks:   Pureed (PUREED) (10-26-24 @ 19:02) [Active]            
Chief Complaint: Diabetes Mellitus follow up    INTERVAL HX:  Patient seen at bedside with daughter present. Had hypoglycemia this morning. Was treated and ate full breakfast per RN.   Reports eating well, finishes 100% of food on each tray.   BG over the last 24 hrs have been within goal range 100-180mg/dl.     Review of Systems:  General: As above  GI: No nausea, vomiting  Endocrine: 1 episode of  hypoglycemia    Allergies    No Known Allergies    Intolerances      MEDICATIONS  (STANDING):  ascorbic acid 500 milliGRAM(s) Oral daily  aspirin 325 milliGRAM(s) Oral daily  atorvastatin 40 milliGRAM(s) Oral at bedtime  dextrose 5%. 1000 milliLiter(s) (100 mL/Hr) IV Continuous <Continuous>  dextrose 5%. 1000 milliLiter(s) (50 mL/Hr) IV Continuous <Continuous>  dextrose 50% Injectable 12.5 Gram(s) IV Push once  dextrose 50% Injectable 25 Gram(s) IV Push once  dextrose 50% Injectable 25 Gram(s) IV Push once  dextrose Oral Gel 15 Gram(s) Oral once  enalapril 10 milliGRAM(s) Oral two times a day  escitalopram 10 milliGRAM(s) Oral daily  glucagon  Injectable 1 milliGRAM(s) IntraMuscular once  heparin   Injectable 5000 Unit(s) SubCutaneous every 8 hours  insulin glargine Injectable (LANTUS) 7 Unit(s) SubCutaneous at bedtime  insulin lispro (ADMELOG) corrective regimen sliding scale   SubCutaneous three times a day before meals  insulin lispro (ADMELOG) corrective regimen sliding scale   SubCutaneous <User Schedule>  memantine 10 milliGRAM(s) Oral two times a day  multivitamin 1 Tablet(s) Oral daily  nystatin Powder 1 Application(s) Topical two times a day  QUEtiapine 25 milliGRAM(s) Oral at bedtime  sodium chloride 0.9%. 1000 milliLiter(s) (50 mL/Hr) IV Continuous <Continuous>  sotalol. 40 milliGRAM(s) Oral every 12 hours      atorvastatin   40 milliGRAM(s) Oral (10-28-24 @ 21:29)    insulin glargine Injectable (LANTUS)   10 Unit(s) SubCutaneous (10-28-24 @ 21:29)    insulin lispro (ADMELOG) corrective regimen sliding scale   3 Unit(s) SubCutaneous (10-28-24 @ 17:10)        PHYSICAL EXAM:  VITALS: T(C): 36.8 (10-29-24 @ 10:28)  T(F): 98.3 (10-29-24 @ 10:28), Max: 98.3 (10-29-24 @ 05:36)  HR: 69 (10-29-24 @ 10:28) (62 - 69)  BP: 120/72 (10-29-24 @ 10:28) (109/61 - 120/72)  RR:  (18 - 18)  SpO2:  (97% - 98%)  Wt(kg): --  GENERAL: NAD  Respiratory: Respirations unlabored   Extremities: Warm, no edema  NEURO: Alert , non verbal.     LABS:  POCT Blood Glucose.: 206 mg/dL (10-29-24 @ 12:21)  POCT Blood Glucose.: 90 mg/dL (10-29-24 @ 08:34)  POCT Blood Glucose.: 66 mg/dL (10-29-24 @ 08:16)  POCT Blood Glucose.: 69 mg/dL (10-29-24 @ 08:06)  POCT Blood Glucose.: 74 mg/dL (10-29-24 @ 06:10)  POCT Blood Glucose.: 127 mg/dL (10-29-24 @ 01:59)  POCT Blood Glucose.: 190 mg/dL (10-28-24 @ 21:19)  POCT Blood Glucose.: 276 mg/dL (10-28-24 @ 16:59)  POCT Blood Glucose.: 129 mg/dL (10-28-24 @ 12:54)  POCT Blood Glucose.: 89 mg/dL (10-28-24 @ 08:26)  POCT Blood Glucose.: 108 mg/dL (10-28-24 @ 06:22)  POCT Blood Glucose.: 164 mg/dL (10-28-24 @ 01:00)  POCT Blood Glucose.: 194 mg/dL (10-27-24 @ 21:03)  POCT Blood Glucose.: 202 mg/dL (10-27-24 @ 17:12)  POCT Blood Glucose.: 262 mg/dL (10-27-24 @ 12:52)  POCT Blood Glucose.: 237 mg/dL (10-27-24 @ 11:43)  POCT Blood Glucose.: 273 mg/dL (10-27-24 @ 10:47)  POCT Blood Glucose.: 104 mg/dL (10-27-24 @ 09:41)  POCT Blood Glucose.: 118 mg/dL (10-27-24 @ 08:18)  POCT Blood Glucose.: 117 mg/dL (10-27-24 @ 05:43)  POCT Blood Glucose.: 137 mg/dL (10-27-24 @ 02:03)  POCT Blood Glucose.: 170 mg/dL (10-26-24 @ 22:29)  POCT Blood Glucose.: 227 mg/dL (10-26-24 @ 17:55)  POCT Blood Glucose.: 380 mg/dL (10-26-24 @ 13:02)                          12.0   8.27  )-----------( 126      ( 29 Oct 2024 07:13 )             38.4     10-29    142  |  110[H]  |  26[H]  ----------------------------<  84  3.6   |  20[L]  |  0.85    Ca    8.6      29 Oct 2024 07:09    TPro  5.5[L]  /  Alb  2.8[L]  /  TBili  0.3  /  DBili  x   /  AST  33  /  ALT  30  /  AlkPhos  89  10-29    eGFR: 68 mL/min/1.73m2 (29 Oct 2024 07:09)      Thyroid Function Tests:  10-27 @ 07:03 TSH 0.80 FreeT4 -- T3 -- Anti TPO -- Anti Thyroglobulin Ab -- TSI --      A1C with Estimated Average Glucose Result: 12.4 % (10-26-24 @ 00:39)    Estimated Average Glucose: 309 mg/dL (10-26-24 @ 00:39)        Diet, Consistent Carbohydrate/No Snacks:   Pureed (PUREED) (10-26-24 @ 19:02) [Active]            
Patient seen today for follow up inpatient Diabetes Mellitus management.    Chief Complaint: Type 2 Diabetes Mellitus     INTERVAL HX:  Patient seen in [facility] [id number] AD. Patient is alert and confused at baseline, hx of dementia, not communicative. Daughter at bedside. Patient started on PO diet- pureed diet, tolerating well. Needs to be fed, full assist. Noted patient got Lantus 10u QAM yesterday and also given at 2200 last night. FBG stable at 117mg/dL- repeat checks q1h include 104, 118, 273mg/dL so far. No hypoglycemia. Blood glucose levels in the last 24hrs have been 104-273mg/dL.     Review of Systems:  General: As above.  Respiratory: AUGUSTINA d/t hx of dementia and mental status baseline.   Gastrointestinal: AUGUSTINA d/t hx of dementia and mental status baseline. Per RN/daughter at bedside, no vomiting, +loose stools.   Endocrine: AUGUSTINA d/t hx of dementia and mental status baseline.     Allergies  No Known Allergies      Intolerances  None.       MEDICATIONS  (STANDING):  acetaminophen     Tablet .. 650 milliGRAM(s) Oral every 6 hours PRN  aspirin 325 milliGRAM(s) Oral daily  atorvastatin 40 milliGRAM(s) Oral at bedtime  cefTRIAXone   IVPB 1000 milliGRAM(s) IV Intermittent every 24 hours  dextrose 5%. 1000 milliLiter(s) IV Continuous <Continuous>  dextrose 5%. 1000 milliLiter(s) IV Continuous <Continuous>  dextrose 50% Injectable 12.5 Gram(s) IV Push once  dextrose 50% Injectable 25 Gram(s) IV Push once  dextrose 50% Injectable 25 Gram(s) IV Push once  dextrose Oral Gel 15 Gram(s) Oral once  enalapril 10 milliGRAM(s) Oral two times a day  escitalopram 10 milliGRAM(s) Oral daily  glucagon  Injectable 1 milliGRAM(s) IntraMuscular once  heparin   Injectable 5000 Unit(s) SubCutaneous every 8 hours  insulin glargine Injectable (LANTUS) 10 Unit(s) SubCutaneous at bedtime  insulin lispro (ADMELOG) corrective regimen sliding scale   SubCutaneous three times a day before meals  insulin lispro (ADMELOG) corrective regimen sliding scale   SubCutaneous <User Schedule>  memantine 10 milliGRAM(s) Oral two times a day  QUEtiapine 25 milliGRAM(s) Oral at bedtime  sodium chloride 0.9%. 1000 milliLiter(s) IV Continuous <Continuous>  sotalol. 40 milliGRAM(s) Oral every 12 hours      atorvastatin 40 milliGRAM(s) Oral at bedtime  dextrose 50% Injectable 25 Gram(s) IV Push once  dextrose 50% Injectable 25 Gram(s) IV Push once  dextrose 50% Injectable 12.5 Gram(s) IV Push once  dextrose Oral Gel 15 Gram(s) Oral once  glucagon  Injectable 1 milliGRAM(s) IntraMuscular once  insulin glargine Injectable (LANTUS) 10 Unit(s) SubCutaneous at bedtime  insulin lispro (ADMELOG) corrective regimen sliding scale   SubCutaneous three times a day before meals  insulin lispro (ADMELOG) corrective regimen sliding scale   SubCutaneous <User Schedule>      insulin lispro (ADMELOG) corrective regimen sliding scale   SubCutaneous three times a day before meals  insulin lispro (ADMELOG) corrective regimen sliding scale   SubCutaneous <User Schedule>      PHYSICAL EXAM:  VITALS:   T(C): 37.1 (10-27-24 @ 09:52), Max: 37.3 (10-27-24 @ 05:21)  HR: 65 (10-27-24 @ 09:52) (59 - 69)  BP: 110/74 (10-27-24 @ 09:52) (103/62 - 128/76)  RR: 18 (10-27-24 @ 09:52) (18 - 18)  SpO2: 97% (10-27-24 @ 09:52) (96% - 99%)    GENERAL: In no acute distress  Respiratory: Respirations unlabored  Extremities: Warm and dry, no edema  NEURO: Alert and confused @baseline, appropriate     LABS:  POCT Blood Glucose.: 273 mg/dL (10-27-24 @ 10:47)  POCT Blood Glucose.: 104 mg/dL (10-27-24 @ 09:41)  POCT Blood Glucose.: 118 mg/dL (10-27-24 @ 08:18)  POCT Blood Glucose.: 117 mg/dL (10-27-24 @ 05:43)  POCT Blood Glucose.: 137 mg/dL (10-27-24 @ 02:03)  POCT Blood Glucose.: 170 mg/dL (10-26-24 @ 22:29)  POCT Blood Glucose.: 227 mg/dL (10-26-24 @ 17:55)  POCT Blood Glucose.: 380 mg/dL (10-26-24 @ 13:02)  POCT Blood Glucose.: 372 mg/dL (10-26-24 @ 10:56)  POCT Blood Glucose.: 437 mg/dL (10-26-24 @ 08:54)  POCT Blood Glucose.: 594 mg/dL (10-25-24 @ 23:49)  POCT Blood Glucose.: >600 mg/dL (10-25-24 @ 23:48)                          12.3   9.57  )-----------( 156      ( 27 Oct 2024 06:59 )             39.4     10-27    146[H]  |  113[H]  |  42[H]  ----------------------------<  129[H]  3.6   |  21[L]  |  0.91    Ca    9.0      27 Oct 2024 07:03  Phos  4.4     10-26  Mg     3.0     10-26    TPro  6.9  /  Alb  3.4  /  TBili  0.5  /  DBili  x   /  AST  19  /  ALT  23  /  AlkPhos  113  10-26    LIVER FUNCTIONS - ( 26 Oct 2024 04:24 )  Alb: 3.4 g/dL / Pro: 6.9 g/dL / ALK PHOS: 113 U/L / ALT: 23 U/L / AST: 19 U/L / GGT: x               Urinalysis Basic - ( 27 Oct 2024 07:03 )    Color: x / Appearance: x / SG: x / pH: x  Gluc: 129 mg/dL / Ketone: x  / Bili: x / Urobili: x   Blood: x / Protein: x / Nitrite: x   Leuk Esterase: x / RBC: x / WBC x   Sq Epi: x / Non Sq Epi: x / Bacteria: x      Culture - Urine (collected 26 Oct 2024 03:32)  Source: Clean Catch Clean Catch (Midstream)  Preliminary Report (27 Oct 2024 05:47):    >100,000 CFU/ml Escherichia coli      A1C with Estimated Average Glucose Result: A1C with Estimated Average Glucose Result: 12.4 % (10-26-24 @ 00:39)

## 2024-11-26 PROCEDURE — 85014 HEMATOCRIT: CPT

## 2024-11-26 PROCEDURE — 87186 SC STD MICRODIL/AGAR DIL: CPT

## 2024-11-26 PROCEDURE — 81001 URINALYSIS AUTO W/SCOPE: CPT

## 2024-11-26 PROCEDURE — 84132 ASSAY OF SERUM POTASSIUM: CPT

## 2024-11-26 PROCEDURE — 99285 EMERGENCY DEPT VISIT HI MDM: CPT

## 2024-11-26 PROCEDURE — 71045 X-RAY EXAM CHEST 1 VIEW: CPT

## 2024-11-26 PROCEDURE — 70450 CT HEAD/BRAIN W/O DYE: CPT | Mod: MC

## 2024-11-26 PROCEDURE — 82947 ASSAY GLUCOSE BLOOD QUANT: CPT

## 2024-11-26 PROCEDURE — 82962 GLUCOSE BLOOD TEST: CPT

## 2024-11-26 PROCEDURE — 36415 COLL VENOUS BLD VENIPUNCTURE: CPT

## 2024-11-26 PROCEDURE — 83690 ASSAY OF LIPASE: CPT

## 2024-11-26 PROCEDURE — 85025 COMPLETE CBC W/AUTO DIFF WBC: CPT

## 2024-11-26 PROCEDURE — 82803 BLOOD GASES ANY COMBINATION: CPT

## 2024-11-26 PROCEDURE — 84295 ASSAY OF SERUM SODIUM: CPT

## 2024-11-26 PROCEDURE — 84443 ASSAY THYROID STIM HORMONE: CPT

## 2024-11-26 PROCEDURE — 85027 COMPLETE CBC AUTOMATED: CPT

## 2024-11-26 PROCEDURE — 83036 HEMOGLOBIN GLYCOSYLATED A1C: CPT

## 2024-11-26 PROCEDURE — 80076 HEPATIC FUNCTION PANEL: CPT

## 2024-11-26 PROCEDURE — 82435 ASSAY OF BLOOD CHLORIDE: CPT

## 2024-11-26 PROCEDURE — 80053 COMPREHEN METABOLIC PANEL: CPT

## 2024-11-26 PROCEDURE — 84100 ASSAY OF PHOSPHORUS: CPT

## 2024-11-26 PROCEDURE — 82010 KETONE BODYS QUAN: CPT

## 2024-11-26 PROCEDURE — 87086 URINE CULTURE/COLONY COUNT: CPT

## 2024-11-26 PROCEDURE — 83605 ASSAY OF LACTIC ACID: CPT

## 2024-11-26 PROCEDURE — 96374 THER/PROPH/DIAG INJ IV PUSH: CPT

## 2024-11-26 PROCEDURE — 83735 ASSAY OF MAGNESIUM: CPT

## 2024-11-26 PROCEDURE — 82330 ASSAY OF CALCIUM: CPT

## 2024-11-26 PROCEDURE — 83930 ASSAY OF BLOOD OSMOLALITY: CPT

## 2024-11-26 PROCEDURE — 84681 ASSAY OF C-PEPTIDE: CPT

## 2024-11-26 PROCEDURE — 80048 BASIC METABOLIC PNL TOTAL CA: CPT

## 2024-11-26 PROCEDURE — 82607 VITAMIN B-12: CPT

## 2024-11-26 PROCEDURE — 92610 EVALUATE SWALLOWING FUNCTION: CPT

## 2024-11-26 PROCEDURE — 85018 HEMOGLOBIN: CPT
